# Patient Record
Sex: FEMALE | Race: OTHER | HISPANIC OR LATINO | Employment: UNEMPLOYED | URBAN - METROPOLITAN AREA
[De-identification: names, ages, dates, MRNs, and addresses within clinical notes are randomized per-mention and may not be internally consistent; named-entity substitution may affect disease eponyms.]

---

## 2017-05-05 ENCOUNTER — HOSPITAL ENCOUNTER (EMERGENCY)
Facility: HOSPITAL | Age: 42
Discharge: HOME | End: 2017-05-05
Admitting: EMERGENCY MEDICINE
Payer: COMMERCIAL

## 2017-05-05 VITALS
WEIGHT: 186 LBS | DIASTOLIC BLOOD PRESSURE: 73 MMHG | HEART RATE: 83 BPM | RESPIRATION RATE: 16 BRPM | SYSTOLIC BLOOD PRESSURE: 133 MMHG | OXYGEN SATURATION: 98 % | TEMPERATURE: 97.6 F

## 2017-05-05 DIAGNOSIS — M54.50 ACUTE LOW BACK PAIN: Primary | ICD-10-CM

## 2017-05-05 LAB
BILIRUB UR QL STRIP: NEGATIVE
CLARITY UR: CLEAR
CLARITY, POC: CLEAR
COLOR UR: YELLOW
COLOR, POC: YELLOW
GLUCOSE UR STRIP-MCNC: NEGATIVE MG/DL
HCG UR QL: NEGATIVE
HGB UR QL STRIP.AUTO: NEGATIVE
KETONES UR STRIP-MCNC: NEGATIVE MG/DL
LEUKOCYTE ESTERASE UR QL STRIP: NEGATIVE
NITRITE UR QL STRIP: NEGATIVE
PH UR STRIP.AUTO: 5.5 [PH] (ref 4.5–8)
PROT UR STRIP-MCNC: NEGATIVE MG/DL
SP GR UR STRIP.AUTO: <=1.005 (ref 1–1.03)
UROBILINOGEN UR QL STRIP.AUTO: 0.2 E.U./DL

## 2017-05-05 PROCEDURE — 99283 EMERGENCY DEPT VISIT LOW MDM: CPT

## 2017-05-05 PROCEDURE — 81025 URINE PREGNANCY TEST: CPT | Performed by: PHYSICIAN ASSISTANT

## 2017-05-05 PROCEDURE — 81002 URINALYSIS NONAUTO W/O SCOPE: CPT | Performed by: PHYSICIAN ASSISTANT

## 2017-05-05 PROCEDURE — 81003 URINALYSIS AUTO W/O SCOPE: CPT

## 2017-05-05 PROCEDURE — 96372 THER/PROPH/DIAG INJ SC/IM: CPT

## 2017-05-05 RX ORDER — METHOCARBAMOL 500 MG/1
500 TABLET, FILM COATED ORAL 4 TIMES DAILY
Qty: 40 TABLET | Refills: 0 | Status: SHIPPED | OUTPATIENT
Start: 2017-05-05 | End: 2017-09-20 | Stop reason: ALTCHOICE

## 2017-05-05 RX ORDER — ESTRADIOL 1 MG/1
2 TABLET ORAL DAILY
COMMUNITY
Start: 2015-04-15 | End: 2018-07-05

## 2017-05-05 RX ORDER — IBUPROFEN 600 MG/1
600 TABLET ORAL EVERY 6 HOURS PRN
Qty: 30 TABLET | Refills: 0 | Status: SHIPPED | OUTPATIENT
Start: 2017-05-05 | End: 2017-09-20 | Stop reason: ALTCHOICE

## 2017-05-05 RX ORDER — ALBUTEROL SULFATE 90 UG/1
2 AEROSOL, METERED RESPIRATORY (INHALATION) EVERY 4 HOURS PRN
COMMUNITY
Start: 2017-01-03 | End: 2018-11-22

## 2017-05-05 RX ORDER — DIVALPROEX SODIUM 250 MG/1
250 TABLET, DELAYED RELEASE ORAL
COMMUNITY
Start: 2016-07-12 | End: 2017-08-16

## 2017-05-05 RX ORDER — KETOROLAC TROMETHAMINE 30 MG/ML
15 INJECTION, SOLUTION INTRAMUSCULAR; INTRAVENOUS ONCE
Status: COMPLETED | OUTPATIENT
Start: 2017-05-05 | End: 2017-05-05

## 2017-05-05 RX ADMIN — KETOROLAC TROMETHAMINE 15 MG: 30 INJECTION, SOLUTION INTRAMUSCULAR at 12:27

## 2017-05-23 ENCOUNTER — HOSPITAL ENCOUNTER (EMERGENCY)
Facility: HOSPITAL | Age: 42
Discharge: HOME | End: 2017-05-23
Admitting: EMERGENCY MEDICINE
Payer: COMMERCIAL

## 2017-05-23 VITALS
WEIGHT: 187 LBS | SYSTOLIC BLOOD PRESSURE: 113 MMHG | HEART RATE: 101 BPM | DIASTOLIC BLOOD PRESSURE: 59 MMHG | TEMPERATURE: 97.9 F | OXYGEN SATURATION: 97 % | RESPIRATION RATE: 18 BRPM

## 2017-05-23 DIAGNOSIS — M54.42 ACUTE BILATERAL LOW BACK PAIN WITH BILATERAL SCIATICA: Primary | ICD-10-CM

## 2017-05-23 DIAGNOSIS — M54.41 ACUTE BILATERAL LOW BACK PAIN WITH BILATERAL SCIATICA: Primary | ICD-10-CM

## 2017-05-23 PROCEDURE — 99283 EMERGENCY DEPT VISIT LOW MDM: CPT

## 2017-05-23 PROCEDURE — 96372 THER/PROPH/DIAG INJ SC/IM: CPT

## 2017-05-23 RX ORDER — KETOROLAC TROMETHAMINE 30 MG/ML
15 INJECTION, SOLUTION INTRAMUSCULAR; INTRAVENOUS ONCE
Status: COMPLETED | OUTPATIENT
Start: 2017-05-23 | End: 2017-05-23

## 2017-05-23 RX ADMIN — KETOROLAC TROMETHAMINE 15 MG: 30 INJECTION, SOLUTION INTRAMUSCULAR at 14:13

## 2017-08-16 ENCOUNTER — HOSPITAL ENCOUNTER (EMERGENCY)
Facility: HOSPITAL | Age: 42
Discharge: HOME | End: 2017-08-16
Attending: EMERGENCY MEDICINE | Admitting: EMERGENCY MEDICINE
Payer: COMMERCIAL

## 2017-08-16 VITALS
SYSTOLIC BLOOD PRESSURE: 111 MMHG | RESPIRATION RATE: 16 BRPM | HEART RATE: 89 BPM | WEIGHT: 185 LBS | DIASTOLIC BLOOD PRESSURE: 75 MMHG | OXYGEN SATURATION: 99 % | TEMPERATURE: 98.9 F

## 2017-08-16 DIAGNOSIS — R51.9 HEADACHE: Primary | ICD-10-CM

## 2017-08-16 LAB
ATRIAL RATE: 86 BPM
ATRIAL RATE: 88 BPM
P AXIS: 63 DEGREES
P AXIS: 64 DEGREES
PR INTERVAL: 160 MS
PR INTERVAL: 162 MS
QRS AXIS: 40 DEGREES
QRS AXIS: 43 DEGREES
QRSD INTERVAL: 100 MS
QRSD INTERVAL: 88 MS
QT INTERVAL: 336 MS
QT INTERVAL: 342 MS
QTC INTERVAL: 402 MS
QTC INTERVAL: 413 MS
T WAVE AXIS: 37 DEGREES
T WAVE AXIS: 45 DEGREES
VENTRICULAR RATE: 86 BPM
VENTRICULAR RATE: 88 BPM

## 2017-08-16 PROCEDURE — 93005 ELECTROCARDIOGRAM TRACING: CPT

## 2017-08-16 PROCEDURE — 99283 EMERGENCY DEPT VISIT LOW MDM: CPT

## 2017-08-16 PROCEDURE — 96365 THER/PROPH/DIAG IV INF INIT: CPT

## 2017-08-16 PROCEDURE — 96376 TX/PRO/DX INJ SAME DRUG ADON: CPT

## 2017-08-16 PROCEDURE — 93005 ELECTROCARDIOGRAM TRACING: CPT | Performed by: EMERGENCY MEDICINE

## 2017-08-16 PROCEDURE — 96375 TX/PRO/DX INJ NEW DRUG ADDON: CPT

## 2017-08-16 RX ORDER — KETOROLAC TROMETHAMINE 30 MG/ML
15 INJECTION, SOLUTION INTRAMUSCULAR; INTRAVENOUS ONCE
Status: COMPLETED | OUTPATIENT
Start: 2017-08-16 | End: 2017-08-16

## 2017-08-16 RX ORDER — OLANZAPINE 5 MG/1
10 TABLET, ORALLY DISINTEGRATING ORAL
Status: DISCONTINUED | OUTPATIENT
Start: 2017-08-16 | End: 2017-08-17 | Stop reason: HOSPADM

## 2017-08-16 RX ORDER — DIPHENHYDRAMINE HYDROCHLORIDE 50 MG/ML
25 INJECTION INTRAMUSCULAR; INTRAVENOUS ONCE
Status: COMPLETED | OUTPATIENT
Start: 2017-08-16 | End: 2017-08-16

## 2017-08-16 RX ORDER — METOCLOPRAMIDE HYDROCHLORIDE 5 MG/ML
10 INJECTION INTRAMUSCULAR; INTRAVENOUS ONCE
Status: COMPLETED | OUTPATIENT
Start: 2017-08-16 | End: 2017-08-16

## 2017-08-16 RX ORDER — SUMATRIPTAN 100 MG/1
100 TABLET, FILM COATED ORAL ONCE AS NEEDED
Qty: 20 TABLET | Refills: 0 | Status: SHIPPED | OUTPATIENT
Start: 2017-08-16 | End: 2018-07-05

## 2017-08-16 RX ADMIN — DIPHENHYDRAMINE HYDROCHLORIDE 25 MG: 50 INJECTION, SOLUTION INTRAMUSCULAR; INTRAVENOUS at 22:58

## 2017-08-16 RX ADMIN — DEXAMETHASONE SODIUM PHOSPHATE 10 MG: 10 INJECTION, SOLUTION INTRAMUSCULAR; INTRAVENOUS at 22:35

## 2017-08-16 RX ADMIN — METOCLOPRAMIDE 10 MG: 5 INJECTION, SOLUTION INTRAMUSCULAR; INTRAVENOUS at 21:12

## 2017-08-16 RX ADMIN — VALPROATE SODIUM 500 MG: 100 INJECTION, SOLUTION INTRAVENOUS at 21:37

## 2017-08-16 RX ADMIN — KETOROLAC TROMETHAMINE 15 MG: 30 INJECTION, SOLUTION INTRAMUSCULAR at 21:07

## 2017-08-16 RX ADMIN — OLANZAPINE 10 MG: 5 TABLET, ORALLY DISINTEGRATING ORAL at 21:26

## 2017-08-16 RX ADMIN — DIPHENHYDRAMINE HYDROCHLORIDE 25 MG: 50 INJECTION, SOLUTION INTRAMUSCULAR; INTRAVENOUS at 21:08

## 2017-08-16 RX ADMIN — SODIUM CHLORIDE 1000 ML: 0.9 INJECTION, SOLUTION INTRAVENOUS at 21:08

## 2017-09-20 ENCOUNTER — HOSPITAL ENCOUNTER (EMERGENCY)
Facility: HOSPITAL | Age: 42
Discharge: HOME | End: 2017-09-20
Attending: EMERGENCY MEDICINE | Admitting: EMERGENCY MEDICINE
Payer: COMMERCIAL

## 2017-09-20 VITALS
WEIGHT: 184 LBS | SYSTOLIC BLOOD PRESSURE: 132 MMHG | RESPIRATION RATE: 18 BRPM | DIASTOLIC BLOOD PRESSURE: 80 MMHG | TEMPERATURE: 98.6 F | OXYGEN SATURATION: 98 % | HEART RATE: 76 BPM

## 2017-09-20 DIAGNOSIS — Z76.0 MEDICATION REFILL: ICD-10-CM

## 2017-09-20 DIAGNOSIS — G43.909 MIGRAINE HEADACHE: Primary | ICD-10-CM

## 2017-09-20 PROCEDURE — 96361 HYDRATE IV INFUSION ADD-ON: CPT

## 2017-09-20 PROCEDURE — 96375 TX/PRO/DX INJ NEW DRUG ADDON: CPT

## 2017-09-20 PROCEDURE — 96374 THER/PROPH/DIAG INJ IV PUSH: CPT

## 2017-09-20 PROCEDURE — 99283 EMERGENCY DEPT VISIT LOW MDM: CPT

## 2017-09-20 RX ORDER — IBUPROFEN 400 MG/1
400 TABLET ORAL EVERY 6 HOURS PRN
Qty: 30 TABLET | Refills: 0 | Status: SHIPPED | OUTPATIENT
Start: 2017-09-20 | End: 2018-01-14

## 2017-09-20 RX ORDER — OLANZAPINE 5 MG/1
10 TABLET, ORALLY DISINTEGRATING ORAL ONCE
Status: COMPLETED | OUTPATIENT
Start: 2017-09-20 | End: 2017-09-20

## 2017-09-20 RX ORDER — DIPHENHYDRAMINE HYDROCHLORIDE 50 MG/ML
50 INJECTION INTRAMUSCULAR; INTRAVENOUS ONCE
Status: COMPLETED | OUTPATIENT
Start: 2017-09-20 | End: 2017-09-20

## 2017-09-20 RX ORDER — ACETAMINOPHEN 325 MG/1
650 TABLET ORAL EVERY 6 HOURS PRN
Qty: 30 TABLET | Refills: 0 | Status: SHIPPED | OUTPATIENT
Start: 2017-09-20 | End: 2018-07-05

## 2017-09-20 RX ORDER — KETOROLAC TROMETHAMINE 30 MG/ML
15 INJECTION, SOLUTION INTRAMUSCULAR; INTRAVENOUS ONCE
Status: COMPLETED | OUTPATIENT
Start: 2017-09-20 | End: 2017-09-20

## 2017-09-20 RX ORDER — METOCLOPRAMIDE HYDROCHLORIDE 5 MG/ML
10 INJECTION INTRAMUSCULAR; INTRAVENOUS ONCE
Status: COMPLETED | OUTPATIENT
Start: 2017-09-20 | End: 2017-09-20

## 2017-09-20 RX ADMIN — SODIUM CHLORIDE 1000 ML: 0.9 INJECTION, SOLUTION INTRAVENOUS at 21:18

## 2017-09-20 RX ADMIN — METOCLOPRAMIDE 10 MG: 5 INJECTION, SOLUTION INTRAMUSCULAR; INTRAVENOUS at 21:25

## 2017-09-20 RX ADMIN — DIPHENHYDRAMINE HYDROCHLORIDE 50 MG: 50 INJECTION, SOLUTION INTRAMUSCULAR; INTRAVENOUS at 21:19

## 2017-09-20 RX ADMIN — DEXAMETHASONE SODIUM PHOSPHATE 10 MG: 10 INJECTION, SOLUTION INTRAMUSCULAR; INTRAVENOUS at 21:23

## 2017-09-20 RX ADMIN — OLANZAPINE 10 MG: 5 TABLET, ORALLY DISINTEGRATING ORAL at 21:12

## 2017-09-20 RX ADMIN — KETOROLAC TROMETHAMINE 15 MG: 30 INJECTION, SOLUTION INTRAMUSCULAR at 21:22

## 2017-11-07 ENCOUNTER — HOSPITAL ENCOUNTER (EMERGENCY)
Facility: HOSPITAL | Age: 42
Discharge: HOME | End: 2017-11-07
Attending: EMERGENCY MEDICINE | Admitting: EMERGENCY MEDICINE
Payer: COMMERCIAL

## 2017-11-07 VITALS
TEMPERATURE: 97.5 F | WEIGHT: 190 LBS | OXYGEN SATURATION: 99 % | DIASTOLIC BLOOD PRESSURE: 77 MMHG | RESPIRATION RATE: 16 BRPM | SYSTOLIC BLOOD PRESSURE: 123 MMHG | HEART RATE: 88 BPM

## 2017-11-07 DIAGNOSIS — G43.909 MIGRAINE: Primary | ICD-10-CM

## 2017-11-07 PROCEDURE — 96361 HYDRATE IV INFUSION ADD-ON: CPT

## 2017-11-07 PROCEDURE — 96374 THER/PROPH/DIAG INJ IV PUSH: CPT

## 2017-11-07 PROCEDURE — 99283 EMERGENCY DEPT VISIT LOW MDM: CPT

## 2017-11-07 PROCEDURE — 96375 TX/PRO/DX INJ NEW DRUG ADDON: CPT

## 2017-11-07 PROCEDURE — 96376 TX/PRO/DX INJ SAME DRUG ADON: CPT

## 2017-11-07 RX ORDER — DIPHENHYDRAMINE HYDROCHLORIDE 50 MG/ML
25 INJECTION INTRAMUSCULAR; INTRAVENOUS EVERY 6 HOURS PRN
Status: DISCONTINUED | OUTPATIENT
Start: 2017-11-07 | End: 2017-11-08 | Stop reason: HOSPADM

## 2017-11-07 RX ORDER — METOCLOPRAMIDE HYDROCHLORIDE 5 MG/ML
10 INJECTION INTRAMUSCULAR; INTRAVENOUS ONCE
Status: COMPLETED | OUTPATIENT
Start: 2017-11-07 | End: 2017-11-07

## 2017-11-07 RX ORDER — KETOROLAC TROMETHAMINE 30 MG/ML
15 INJECTION, SOLUTION INTRAMUSCULAR; INTRAVENOUS ONCE
Status: COMPLETED | OUTPATIENT
Start: 2017-11-07 | End: 2017-11-07

## 2017-11-07 RX ORDER — DIPHENHYDRAMINE HYDROCHLORIDE 50 MG/ML
25 INJECTION INTRAMUSCULAR; INTRAVENOUS ONCE
Status: COMPLETED | OUTPATIENT
Start: 2017-11-07 | End: 2017-11-07

## 2017-11-07 RX ADMIN — KETOROLAC TROMETHAMINE 15 MG: 30 INJECTION, SOLUTION INTRAMUSCULAR at 20:39

## 2017-11-07 RX ADMIN — DIPHENHYDRAMINE HYDROCHLORIDE 25 MG: 50 INJECTION, SOLUTION INTRAMUSCULAR; INTRAVENOUS at 20:40

## 2017-11-07 RX ADMIN — SODIUM CHLORIDE 1000 ML: 0.9 INJECTION, SOLUTION INTRAVENOUS at 20:38

## 2017-11-07 RX ADMIN — METOCLOPRAMIDE 10 MG: 5 INJECTION, SOLUTION INTRAMUSCULAR; INTRAVENOUS at 21:35

## 2017-11-07 RX ADMIN — KETOROLAC TROMETHAMINE 15 MG: 30 INJECTION, SOLUTION INTRAMUSCULAR at 21:33

## 2017-11-07 RX ADMIN — DIPHENHYDRAMINE HYDROCHLORIDE 25 MG: 50 INJECTION, SOLUTION INTRAMUSCULAR; INTRAVENOUS at 21:32

## 2017-11-07 RX ADMIN — METOCLOPRAMIDE 10 MG: 5 INJECTION, SOLUTION INTRAMUSCULAR; INTRAVENOUS at 20:42

## 2017-11-08 NOTE — ED PROVIDER NOTES
History  Chief Complaint   Patient presents with    Headache     migraine headache, hx of same  reports nausea and vomting  reports sensitivity to light and sound  started today  History provided by:  Patient   used: No    Migraine   Location:  Diffuse head  Timing:  Constant  Progression:  Unchanged  Chronicity:  Recurrent  Ineffective treatments:  Naproxen  Associated symptoms: headaches, nausea and vomiting    Associated symptoms: no congestion, no cough, no fever, no myalgias, no rash, no rhinorrhea and no sore throat        Prior to Admission Medications   Prescriptions Last Dose Informant Patient Reported? Taking? DULoxetine (CYMBALTA) 60 mg delayed release capsule   Yes No   Sig: Take 60 mg by mouth daily  QUEtiapine Fumarate (SEROQUEL PO)   Yes No   Sig: Take 800 mg by mouth daily at bedtime  Riboflavin 400 MG CAPS   No No   Sig: Take 1 capsule by mouth daily   SUMAtriptan (IMITREX) 100 mg tablet   No No   Sig: Take 1 tablet by mouth once as needed for migraine for up to 1 dose   acetaminophen (TYLENOL) 325 mg tablet   No No   Sig: Take 2 tablets by mouth every 6 (six) hours as needed for mild pain or fever   albuterol (PROVENTIL HFA,VENTOLIN HFA) 90 mcg/act inhaler   Yes No   Sig: Inhale 2 puffs   clonazePAM (KlonoPIN) 2 mg tablet   Yes No   Sig: Take 2 mg by mouth 2 (two) times a day as needed for seizures  estradiol (ESTRACE) 1 mg tablet   Yes No   Sig: Take 2 mg by mouth   ibuprofen (MOTRIN) 400 mg tablet   No No   Sig: Take 1 tablet by mouth every 6 (six) hours as needed for mild pain for up to 7 days   magnesium oxide (MAG-OX) 400 mg   No No   Sig: Take 1 tablet by mouth daily      Facility-Administered Medications: None       Past Medical History:   Diagnosis Date    Asthma     Migraine     Pituitary tumor     Psychiatric disorder     Depression       Past Surgical History:   Procedure Laterality Date    HYSTERECTOMY         History reviewed   No pertinent family history  I have reviewed and agree with the history as documented  Social History   Substance Use Topics    Smoking status: Never Smoker    Smokeless tobacco: Never Used    Alcohol use No        Review of Systems   Constitutional: Negative for chills and fever  HENT: Negative for congestion, rhinorrhea, sinus pressure and sore throat  Eyes: Negative for photophobia, pain, redness and visual disturbance  Respiratory: Negative for cough  Gastrointestinal: Positive for nausea and vomiting  Musculoskeletal: Negative for myalgias, neck pain and neck stiffness  Skin: Negative for rash  Neurological: Positive for headaches  Negative for dizziness, facial asymmetry, speech difficulty, weakness, light-headedness and numbness  Psychiatric/Behavioral: Negative for confusion  All other systems reviewed and are negative  Physical Exam  ED Triage Vitals   Temperature Pulse Respirations Blood Pressure SpO2   11/07/17 2023 11/07/17 2023 11/07/17 2023 11/07/17 2023 11/07/17 2023   97 5 °F (36 4 °C) 91 16 132/78 98 %      Temp Source Heart Rate Source Patient Position - Orthostatic VS BP Location FiO2 (%)   11/07/17 2023 11/07/17 2127 11/07/17 2023 11/07/17 2023 --   Oral Monitor Sitting Right arm       Pain Score       11/07/17 2023       Worst Possible Pain           Orthostatic Vital Signs  Vitals:    11/07/17 2023 11/07/17 2127   BP: 132/78 123/77   Pulse: 91 88   Patient Position - Orthostatic VS: Sitting Lying       Physical Exam   Constitutional: She is oriented to person, place, and time  Vital signs are normal  She appears well-developed and well-nourished  Non-toxic appearance  She does not have a sickly appearance  She does not appear ill  No distress  HENT:   Head: Normocephalic and atraumatic     Right Ear: Tympanic membrane normal    Left Ear: Tympanic membrane normal    Nose: Nose normal    Mouth/Throat: Oropharynx is clear and moist and mucous membranes are normal  No oropharyngeal exudate  Eyes: Conjunctivae and EOM are normal  Pupils are equal, round, and reactive to light  Neck: Normal range of motion and full passive range of motion without pain  Neck supple  No neck rigidity  No Brudzinski's sign and no Kernig's sign noted  Cardiovascular: Normal rate, regular rhythm, normal heart sounds and intact distal pulses  Exam reveals no gallop and no friction rub  No murmur heard  Pulmonary/Chest: Effort normal and breath sounds normal  No respiratory distress  She has no wheezes  She has no rales  Abdominal: Soft  Bowel sounds are normal  She exhibits no distension  There is no tenderness  There is no rigidity, no rebound and no guarding  Lymphadenopathy:     She has no cervical adenopathy  Neurological: She is alert and oriented to person, place, and time  She has normal strength  No cranial nerve deficit or sensory deficit  She exhibits normal muscle tone  Coordination and gait normal    Skin: Skin is warm and dry  No ecchymosis, no petechiae and no rash noted  She is not diaphoretic  No pallor  Nursing note and vitals reviewed        ED Medications  Medications   diphenhydrAMINE (BENADRYL) injection 25 mg (25 mg Intravenous Given 11/7/17 2040)   sodium chloride 0 9 % bolus 1,000 mL (0 mL Intravenous Stopped 11/7/17 2212)   ketorolac (TORADOL) 30 mg/mL injection 15 mg (15 mg Intravenous Given 11/7/17 2039)   metoclopramide (REGLAN) injection 10 mg (10 mg Intravenous Given 11/7/17 2042)   metoclopramide (REGLAN) injection 10 mg (10 mg Intravenous Given 11/7/17 2135)   diphenhydrAMINE (BENADRYL) injection 25 mg (25 mg Intravenous Given 11/7/17 2132)   ketorolac (TORADOL) 30 mg/mL injection 15 mg (15 mg Intravenous Given 11/7/17 2133)       Diagnostic Studies  Results Reviewed     None                 No orders to display              Procedures  Procedures       Phone Contacts  ED Phone Contact    ED Course  ED Course as of Nov 07 2312 Tue Nov 07, 2017   12 Pt reports relief of pain  [facility]  Number of Diagnoses or Management Options  Diagnosis management comments: Migraine - Will give symptomatic tx  CritCare Time    Disposition  Final diagnoses:   Migraine     Time reflects when diagnosis was documented in both MDM as applicable and the Disposition within this note     Time User Action Codes Description Comment    11/7/2017 10:22 PM Kishan Romy 48 [G25 450] Migraine       ED Disposition     ED Disposition Condition Comment    Discharge  Aditi Kilpatrick discharge to home/self care  Condition at discharge: Good        Follow-up Information     Follow up With Specialties Details Why [facility] Schedule an appointment as soon as possible for a visit For follow up [phone]  [phone]          Discharge Medication List as of 11/7/2017 10:22 PM      CONTINUE these medications which have NOT CHANGED    Details   acetaminophen (TYLENOL) 325 mg tablet Take 2 tablets by mouth every 6 (six) hours as needed for mild pain or fever, Starting Wed 9/20/2017, Print      albuterol (PROVENTIL HFA,VENTOLIN HFA) 90 mcg/act inhaler Inhale 2 puffs, Starting 1/3/2017, Until Discontinued, Historical Med      clonazePAM (KlonoPIN) 2 mg tablet Take 2 mg by mouth 2 (two) times a day as needed for seizures  , Until Discontinued, Historical Med      DULoxetine (CYMBALTA) 60 mg delayed release capsule Take 60 mg by mouth daily  , Until Discontinued, Historical Med      estradiol (ESTRACE) 1 mg tablet Take 2 mg by mouth, Starting 4/15/2015, Until Discontinued, Historical Med      ibuprofen (MOTRIN) 400 mg tablet Take 1 tablet by mouth every 6 (six) hours as needed for mild pain for up to 7 days, Starting Wed 9/20/2017, Until Wed 9/27/2017, Print      magnesium oxide (MAG-OX) 400 mg Take 1 tablet by mouth daily, Starting Wed 9/20/2017, Print      QUEtiapine Fumarate (SEROQUEL PO) Take 800 mg by mouth daily at bedtime  , Until Discontinued, Historical Med      Riboflavin 400 MG CAPS Take 1 capsule by mouth daily, Starting Wed 9/20/2017, Print      SUMAtriptan (IMITREX) 100 mg tablet Take 1 tablet by mouth once as needed for migraine for up to 1 dose, Starting Wed 8/16/2017, Print           No discharge procedures on file      ED Provider  Electronically Signed by           Scotty Bucio 24, DO  11/07/17 5296

## 2017-11-15 ENCOUNTER — HOSPITAL ENCOUNTER (EMERGENCY)
Facility: HOSPITAL | Age: 42
Discharge: HOME | End: 2017-11-15
Admitting: EMERGENCY MEDICINE
Payer: COMMERCIAL

## 2017-11-15 VITALS
TEMPERATURE: 98.1 F | OXYGEN SATURATION: 99 % | SYSTOLIC BLOOD PRESSURE: 126 MMHG | RESPIRATION RATE: 18 BRPM | WEIGHT: 200 LBS | HEART RATE: 78 BPM | DIASTOLIC BLOOD PRESSURE: 80 MMHG

## 2017-11-15 DIAGNOSIS — R30.0 DYSURIA: Primary | ICD-10-CM

## 2017-11-15 DIAGNOSIS — G89.29 CHRONIC BILATERAL LOW BACK PAIN WITHOUT SCIATICA: ICD-10-CM

## 2017-11-15 DIAGNOSIS — M54.50 CHRONIC BILATERAL LOW BACK PAIN WITHOUT SCIATICA: ICD-10-CM

## 2017-11-15 LAB
ALBUMIN SERPL BCP-MCNC: 3.6 G/DL (ref 3.5–5)
ALP SERPL-CCNC: 97 U/L (ref 46–116)
ALT SERPL W P-5'-P-CCNC: 39 U/L (ref 12–78)
ANION GAP SERPL CALCULATED.3IONS-SCNC: 8 MMOL/L (ref 4–13)
AST SERPL W P-5'-P-CCNC: 35 U/L (ref 5–45)
BACTERIA UR QL AUTO: ABNORMAL /HPF
BASOPHILS # BLD AUTO: 0.01 THOUSANDS/ΜL (ref 0–0.1)
BASOPHILS NFR BLD AUTO: 0 % (ref 0–1)
BILIRUB SERPL-MCNC: 0.19 MG/DL (ref 0.2–1)
BILIRUB UR QL STRIP: NEGATIVE
BUN SERPL-MCNC: 11 MG/DL (ref 5–25)
CALCIUM SERPL-MCNC: 8.4 MG/DL (ref 8.3–10.1)
CHLORIDE SERPL-SCNC: 104 MMOL/L (ref 100–108)
CLARITY UR: CLEAR
CO2 SERPL-SCNC: 29 MMOL/L (ref 21–32)
COLOR UR: YELLOW
CREAT SERPL-MCNC: 0.93 MG/DL (ref 0.6–1.3)
EOSINOPHIL # BLD AUTO: 0.15 THOUSAND/ΜL (ref 0–0.61)
EOSINOPHIL NFR BLD AUTO: 2 % (ref 0–6)
ERYTHROCYTE [DISTWIDTH] IN BLOOD BY AUTOMATED COUNT: 13 % (ref 11.6–15.1)
GFR SERPL CREATININE-BSD FRML MDRD: 76 ML/MIN/1.73SQ M
GLUCOSE SERPL-MCNC: 105 MG/DL (ref 65–140)
GLUCOSE UR STRIP-MCNC: NEGATIVE MG/DL
HCT VFR BLD AUTO: 35 % (ref 34.8–46.1)
HGB BLD-MCNC: 11.7 G/DL (ref 11.5–15.4)
HGB UR QL STRIP.AUTO: ABNORMAL
KETONES UR STRIP-MCNC: NEGATIVE MG/DL
LEUKOCYTE ESTERASE UR QL STRIP: NEGATIVE
LIPASE SERPL-CCNC: 127 U/L (ref 73–393)
LYMPHOCYTES # BLD AUTO: 2.08 THOUSANDS/ΜL (ref 0.6–4.47)
LYMPHOCYTES NFR BLD AUTO: 31 % (ref 14–44)
MCH RBC QN AUTO: 31 PG (ref 26.8–34.3)
MCHC RBC AUTO-ENTMCNC: 33.4 G/DL (ref 31.4–37.4)
MCV RBC AUTO: 93 FL (ref 82–98)
MONOCYTES # BLD AUTO: 0.56 THOUSAND/ΜL (ref 0.17–1.22)
MONOCYTES NFR BLD AUTO: 8 % (ref 4–12)
NEUTROPHILS # BLD AUTO: 4.03 THOUSANDS/ΜL (ref 1.85–7.62)
NEUTS SEG NFR BLD AUTO: 59 % (ref 43–75)
NITRITE UR QL STRIP: NEGATIVE
NON-SQ EPI CELLS URNS QL MICRO: ABNORMAL /HPF
NRBC BLD AUTO-RTO: 0 /100 WBCS
PH UR STRIP.AUTO: 7 [PH] (ref 4.5–8)
PLATELET # BLD AUTO: 247 THOUSANDS/UL (ref 149–390)
PMV BLD AUTO: 10.2 FL (ref 8.9–12.7)
POTASSIUM SERPL-SCNC: 3.5 MMOL/L (ref 3.5–5.3)
PROT SERPL-MCNC: 7.2 G/DL (ref 6.4–8.2)
PROT UR STRIP-MCNC: NEGATIVE MG/DL
RBC # BLD AUTO: 3.78 MILLION/UL (ref 3.81–5.12)
RBC #/AREA URNS AUTO: ABNORMAL /HPF
SODIUM SERPL-SCNC: 141 MMOL/L (ref 136–145)
SP GR UR STRIP.AUTO: 1.02 (ref 1–1.03)
UROBILINOGEN UR QL STRIP.AUTO: 0.2 E.U./DL
WBC # BLD AUTO: 6.83 THOUSAND/UL (ref 4.31–10.16)
WBC #/AREA URNS AUTO: ABNORMAL /HPF

## 2017-11-15 PROCEDURE — 96374 THER/PROPH/DIAG INJ IV PUSH: CPT

## 2017-11-15 PROCEDURE — 99284 EMERGENCY DEPT VISIT MOD MDM: CPT

## 2017-11-15 PROCEDURE — 85025 COMPLETE CBC W/AUTO DIFF WBC: CPT | Performed by: NURSE PRACTITIONER

## 2017-11-15 PROCEDURE — 80053 COMPREHEN METABOLIC PANEL: CPT | Performed by: NURSE PRACTITIONER

## 2017-11-15 PROCEDURE — 83690 ASSAY OF LIPASE: CPT | Performed by: NURSE PRACTITIONER

## 2017-11-15 PROCEDURE — 81001 URINALYSIS AUTO W/SCOPE: CPT

## 2017-11-15 PROCEDURE — 36415 COLL VENOUS BLD VENIPUNCTURE: CPT | Performed by: NURSE PRACTITIONER

## 2017-11-15 PROCEDURE — 96361 HYDRATE IV INFUSION ADD-ON: CPT

## 2017-11-15 PROCEDURE — 81002 URINALYSIS NONAUTO W/O SCOPE: CPT | Performed by: NURSE PRACTITIONER

## 2017-11-15 RX ORDER — SULFAMETHOXAZOLE AND TRIMETHOPRIM 800; 160 MG/1; MG/1
1 TABLET ORAL 2 TIMES DAILY
Qty: 6 TABLET | Refills: 0 | Status: SHIPPED | OUTPATIENT
Start: 2017-11-15 | End: 2017-11-18

## 2017-11-15 RX ORDER — SULFAMETHOXAZOLE AND TRIMETHOPRIM 800; 160 MG/1; MG/1
1 TABLET ORAL ONCE
Status: COMPLETED | OUTPATIENT
Start: 2017-11-15 | End: 2017-11-15

## 2017-11-15 RX ORDER — ONDANSETRON 2 MG/ML
4 INJECTION INTRAMUSCULAR; INTRAVENOUS ONCE
Status: COMPLETED | OUTPATIENT
Start: 2017-11-15 | End: 2017-11-15

## 2017-11-15 RX ADMIN — SULFAMETHOXAZOLE AND TRIMETHOPRIM 1 TABLET: 800; 160 TABLET ORAL at 21:05

## 2017-11-15 RX ADMIN — SODIUM CHLORIDE 1000 ML: 0.9 INJECTION, SOLUTION INTRAVENOUS at 20:15

## 2017-11-15 RX ADMIN — ONDANSETRON 4 MG: 2 INJECTION INTRAMUSCULAR; INTRAVENOUS at 20:16

## 2017-11-16 NOTE — DISCHARGE INSTRUCTIONS
Your lab testing and urine testing today was unremarkable for an acute process  You are being treated for the dysuria (painful urination) that you have  You are to take the bactrim DS as prescribed  Increase water intake  Follow up with your PCP    Chronic Back Pain   WHAT YOU NEED TO KNOW:   Chronic back pain is back pain that lasts 3 months or longer  This may include pain that has not been controlled or does not improve with treatment  Your back pain may cause weakness or pain that spreads to your arms or legs  DISCHARGE INSTRUCTIONS:   Return to the emergency department if:   · You have severe pain  · You have new signs of numbness or weakness, especially in your lower back, legs, arms, or genital area  · You lose control of your bladder or bowel movements  · You have a fever or sudden weight loss  Contact your healthcare provider if:   · You have new or worsened pain  · You have questions or concerns about your condition or care  Medicines:   · NSAIDs  help decrease swelling and pain  This medicine can be bought with or without a doctor's order  This medicine can cause stomach bleeding or kidney problems in certain people  If you take blood thinner medicine, always ask your healthcare provider if NSAIDs are safe for you  Always read the medicine label and follow the directions on it before using this medicine  · Acetaminophen  decreases pain  It is available without a doctor's order  Ask how much to take and how often to take it  Follow directions  Acetaminophen can cause liver damage if not taken correctly  · Prescription pain medicine  may also be given to decrease pain  Do not wait until the pain is severe before you take this medicine  · Muscle relaxers  help decrease muscle spasms and back pain  · Take your medicine as directed  Contact your healthcare provider if you think your medicine is not helping or if you have side effects  Tell him if you are allergic to any medicine  Keep a list of the medicines, vitamins, and herbs you take  Include the amounts, and when and why you take them  Bring the list or the pill bottles to follow-up visits  Carry your medicine list with you in case of an emergency  Follow up with your healthcare provider as directed: You may be referred to a sports medicine or spine specialist  Write down your questions so you remember to ask them during your visits  Manage your chronic back pain:   · Heat  helps decrease pain and muscle spasms  Apply heat on your back for 15 to 20 minutes every 2 hours or as often as directed  · Stay active  as much as you can without causing more pain  Ask your healthcare provider what exercises are right for you  Do not sit or lie down for long periods  This could make your back pain worse  Avoid heavy lifting until your pain is gone  · A physical therapist  can teach you exercises to help improve movement and strength, and to decrease pain  © 2017 [address] Information is for End User's use only and may not be sold, redistributed or otherwise used for commercial purposes  All illustrations and images included in CareNotes® are the copyrighted property of [employer] D [employer] M [employer]  or [address]  The above information is an  only  It is not intended as medical advice for individual conditions or treatments  Talk to your doctor, nurse or pharmacist before following any medical regimen to see if it is safe and effective for you  Dysuria   WHAT YOU NEED TO KNOW:   Dysuria is difficulty urinating, or pain, burning, or discomfort with urination  Dysuria is usually a symptom of another problem  DISCHARGE INSTRUCTIONS:   Return to the emergency department if:   · You have severe back, side, or abdominal pain  · You have fever and shaking chills  · You vomit several times in a row  Contact your healthcare provider if:   · Your symptoms do not go away, even after treatment       · You have questions or concerns about your condition or care  Medicines:   · Medicines  may be given to help treat a bacterial infection or help decrease bladder spasms  · Take your medicine as directed  Contact your healthcare provider if you think your medicine is not helping or if you have side effects  Tell him of her if you are allergic to any medicine  Keep a list of the medicines, vitamins, and herbs you take  Include the amounts, and when and why you take them  Bring the list or the pill bottles to follow-up visits  Carry your medicine list with you in case of an emergency  Follow up with your healthcare provider as directed: Your healthcare provider may also refer you to a urologist or nephrologist to have additional testing  Write down your questions so you remember to ask them during your visits  Manage your dysuria:   · Drink more liquids  Liquids help flush out bacteria that may be causing an infection  Ask your healthcare provider how much liquid to drink each day and which liquids are best for you  · Take sitz baths as directed  Fill a bathtub with 4 to 6 inches of warm water  You may also use a sitz bath pan that fits over a toilet  Sit in the sitz bath for 20 minutes  Do this 2 to 3 times a day, or as directed  The warm water can help decrease pain and swelling  © 2017 [facility] Information is for End User's use only and may not be sold, redistributed or otherwise used for commercial purposes  All illustrations and images included in CareNotes® are the copyrighted property of [employer]  or Dmitriy Swenson  The above information is an  only  It is not intended as medical advice for individual conditions or treatments  Talk to your doctor, nurse or pharmacist before following any medical regimen to see if it is safe and effective for you

## 2017-11-16 NOTE — ED PROVIDER NOTES
History  Chief Complaint   Patient presents with    Flank Pain     "blood work for [address] for my kidneys because i have been peeing alot " right sided back pains  called PCP and told to come to ED  This is a 43year old female who states has lower back pain and is seeing pain management and has received 1 steroid injection  She states that she is now experiencing dysuria, relief with voiding, more back pain and vomiting  She denies hematuria or hx of kidney stones  Denies fevers, chills, nausea  Pt states her PCP had given her lab slip for blood and urine but called her PCP with c/o and was told to come to ED   LMP  hysteretomy          History provided by:  Patient and medical records   used: No    Flank Pain   Pain location: bilateral lower back   Pain quality: stabbing    Pain radiates to:  Does not radiate  Pain severity:  Mild  Onset quality:  Gradual  Progression:  Waxing and waning  Chronicity:  New  Relieved by:  Nothing  Worsened by:  Urination  Ineffective treatments:  None tried  Associated symptoms: dysuria and vomiting    Associated symptoms: no hematuria        Prior to Admission Medications   Prescriptions Last Dose Informant Patient Reported? Taking? DULoxetine (CYMBALTA) 60 mg delayed release capsule   Yes No   Sig: Take 60 mg by mouth daily  QUEtiapine Fumarate (SEROQUEL PO)   Yes No   Sig: Take 800 mg by mouth daily at bedtime  Riboflavin 400 MG CAPS   No No   Sig: Take 1 capsule by mouth daily   SUMAtriptan (IMITREX) 100 mg tablet   No No   Sig: Take 1 tablet by mouth once as needed for migraine for up to 1 dose   acetaminophen (TYLENOL) 325 mg tablet   No No   Sig: Take 2 tablets by mouth every 6 (six) hours as needed for mild pain or fever   albuterol (PROVENTIL HFA,VENTOLIN HFA) 90 mcg/act inhaler   Yes No   Sig: Inhale 2 puffs   clonazePAM (KlonoPIN) 2 mg tablet   Yes No   Sig: Take 2 mg by mouth 2 (two) times a day as needed for seizures     estradiol (ESTRACE) 1 mg tablet   Yes No   Sig: Take 2 mg by mouth   ibuprofen (MOTRIN) 400 mg tablet   No No   Sig: Take 1 tablet by mouth every 6 (six) hours as needed for mild pain for up to 7 days   magnesium oxide (MAG-OX) 400 mg   No No   Sig: Take 1 tablet by mouth daily      Facility-Administered Medications: None       Past Medical History:   Diagnosis Date    Asthma     Migraine     Pituitary tumor     Psychiatric disorder     Depression       Past Surgical History:   Procedure Laterality Date    HYSTERECTOMY         History reviewed  No pertinent family history  I have reviewed and agree with the history as documented  Social History   Substance Use Topics    Smoking status: Never Smoker    Smokeless tobacco: Never Used    Alcohol use No        Review of Systems   Constitutional: Negative  HENT: Negative  Eyes: Negative  Respiratory: Negative  Cardiovascular: Negative  Gastrointestinal: Positive for vomiting  Endocrine: Negative  Genitourinary: Positive for dysuria, flank pain and urgency  Negative for hematuria  Musculoskeletal: Positive for back pain  Skin: Negative  Allergic/Immunologic: Negative  Neurological: Negative  Hematological: Negative  Psychiatric/Behavioral: Negative  Physical Exam  ED Triage Vitals [11/15/17 1951]   Temperature Pulse Respirations Blood Pressure SpO2   98 1 °F (36 7 °C) 78 18 126/80 99 %      Temp src Heart Rate Source Patient Position - Orthostatic VS BP Location FiO2 (%)   -- Monitor -- Right arm --      Pain Score       7           Orthostatic Vital Signs  Vitals:    11/15/17 1951   BP: 126/80   Pulse: 78       Physical Exam   Constitutional: She is oriented to person, place, and time  She appears well-developed and well-nourished  No distress  HENT:   Head: Normocephalic and atraumatic  Eyes: EOM are normal  Pupils are equal, round, and reactive to light  Neck: Normal range of motion  Neck supple     Cardiovascular: Normal rate, regular rhythm and normal heart sounds  Pulmonary/Chest: Effort normal and breath sounds normal    Abdominal: Soft  Bowel sounds are normal  She exhibits no distension  There is no tenderness  No flank pain     Musculoskeletal: Normal range of motion  She exhibits tenderness  She exhibits no edema or deformity  Neurological: She is alert and oriented to person, place, and time  Skin: Skin is warm and dry  Capillary refill takes less than 2 seconds  She is not diaphoretic  Psychiatric: She has a normal mood and affect  Her behavior is normal  Judgment and thought content normal    Nursing note and vitals reviewed        ED Medications  Medications   sodium chloride 0 9 % bolus 1,000 mL (1,000 mL Intravenous New Bag 11/15/17 2015)   sulfamethoxazole-trimethoprim (BACTRIM DS) 800-160 mg per tablet 1 tablet (not administered)   ondansetron (ZOFRAN) injection 4 mg (4 mg Intravenous Given 11/15/17 2016)       Diagnostic Studies  Results Reviewed     Procedure Component Value Units Date/Time    Urine Microscopic [[id number]]  (Abnormal) Collected:  11/15/17 2119    Lab Status:  Final result Specimen:  Urine from Urine, Clean Catch Updated:  11/15/17 2044     RBC, UA 1-2 (A) /hpf      WBC, UA None Seen /hpf      Epithelial Cells Occasional /hpf      Bacteria, UA Occasional /hpf     Comprehensive metabolic panel [[id number]]  (Abnormal) Collected:  11/15/17 2017    Lab Status:  Final result Specimen:  Blood from Arm, Right Updated:  11/15/17 2043     Sodium 141 mmol/L      Potassium 3 5 mmol/L      Chloride 104 mmol/L      CO2 29 mmol/L      Anion Gap 8 mmol/L      BUN 11 mg/dL      Creatinine 0 93 mg/dL      Glucose 105 mg/dL      Calcium 8 4 mg/dL      AST 35 U/L      ALT 39 U/L      Alkaline Phosphatase 97 U/L      Total Protein 7 2 g/dL      Albumin 3 6 g/dL      Total Bilirubin 0 19 (L) mg/dL      eGFR 76 ml/min/1 73sq m     Narrative:         National Kidney Disease Education Program recommendations are as follows:  GFR calculation is accurate only with a steady state creatinine  Chronic Kidney disease less than 60 ml/min/1 73 sq  meters  Kidney failure less than 15 ml/min/1 73 sq  meters      Lipase [[id number]]  (Normal) Collected:  11/15/17 2017    Lab Status:  Final result Specimen:  Blood from Arm, Right Updated:  11/15/17 2043     Lipase 127 u/L     CBC and differential [[id number]]  (Abnormal) Collected:  11/15/17 2017    Lab Status:  Final result Specimen:  Blood from Arm, Right Updated:  11/15/17 2032     WBC 6 83 Thousand/uL      RBC 3 78 (L) Million/uL      Hemoglobin 11 7 g/dL      Hematocrit 35 0 %      MCV 93 fL      MCH 31 0 pg      MCHC 33 4 g/dL      RDW 13 0 %      MPV 10 2 fL      Platelets 827 Thousands/uL      nRBC 0 /100 WBCs      Neutrophils Relative 59 %      Lymphocytes Relative 31 %      Monocytes Relative 8 %      Eosinophils Relative 2 %      Basophils Relative 0 %      Neutrophils Absolute 4 03 Thousands/µL      Lymphocytes Absolute 2 08 Thousands/µL      Monocytes Absolute 0 56 Thousand/µL      Eosinophils Absolute 0 15 Thousand/µL      Basophils Absolute 0 01 Thousands/µL     POCT urinalysis dipstick [[id number]]  (Abnormal) Resulted:  11/15/17 2005    Lab Status:  Final result Specimen:  Urine Updated:  11/15/17 2005    ED Urine Macroscopic [[id number]]  (Abnormal) Collected:  11/15/17 2119    Lab Status:  Final result Specimen:  Urine Updated:  11/15/17 2004     Color, UA Yellow     Clarity, UA Clear     pH, UA 7 0     Leukocytes, UA Negative     Nitrite, UA Negative     Protein, UA Negative mg/dl      Glucose, UA Negative mg/dl      Ketones, UA Negative mg/dl      Urobilinogen, UA 0 2 E U /dl      Bilirubin, UA Negative     Blood, UA Trace (A)     Specific [place], UA 1 025    Narrative:       CLINITEK RESULT                 No orders to display              Procedures  Procedures       Phone Contacts  ED Phone Contact    ED Course  ED Course as of Nov 15 2053   Wed Nov 15, 2017   2047 Labs reviewed and are unremarkable  Urine does not show infection  Will treat symptoms x 3 days and pt is to follow up  Pt verbalizes understanding of d/c instructions  MDM  Number of Diagnoses or Management Options  Diagnosis management comments: Differential diagnosis:  Kidney stones, pyelonephritis, UTI, lumbar pain     Labs  Urine  IVF  zofran         Amount and/or Complexity of Data Reviewed  Clinical lab tests: ordered and reviewed  Tests in the radiology section of CPT®: ordered and reviewed  Review and summarize past medical records: yes      CritCare Time    Disposition  Final diagnoses:   Dysuria   Chronic bilateral low back pain without sciatica     Time reflects when diagnosis was documented in both MDM as applicable and the Disposition within this note     Time User Action Codes Description Comment    11/15/2017  8:50 PM Che Earline Add [R30 0] Dysuria     11/15/2017  8:50 PM Che Earline Add [M54 5,  G89 29] Chronic bilateral low back pain without sciatica       ED Disposition     ED Disposition Condition Comment    Discharge  Elijah Frame discharge to home/self care      Condition at discharge: Good        Follow-up Information     Follow up With Specialties Details Why Contact Info Additional [phone] [facility] Internal Medicine Schedule an appointment as soon as possible for a visit in 2 days  [place]  [facility] [address] Emergency Department Emergency Medicine  If symptoms worsen Erica Remy [phone] [facility] ED, [address] [place], [place], [address]        Patient's Medications   Discharge Prescriptions    SULFAMETHOXAZOLE-TRIMETHOPRIM (BACTRIM DS) 800-160 MG PER TABLET    Take 1 tablet by mouth 2 (two) times a day for 3 days smx-tmp DS (BACTRIM) 800-160 mg tabs (1tab q12 D10)       Start Date: 11/15/2017End Date: 11/18/2017       Order Dose: 1 tablet       Quantity: 6 tablet    Refills: 0     No discharge procedures on file      ED Provider  Electronically Signed by           Jocelyn Morgan  11/15/17 1135

## 2017-11-27 ENCOUNTER — HOSPITAL ENCOUNTER (EMERGENCY)
Facility: HOSPITAL | Age: 42
Discharge: HOME | End: 2017-11-28
Attending: EMERGENCY MEDICINE | Admitting: EMERGENCY MEDICINE
Payer: COMMERCIAL

## 2017-11-27 VITALS
RESPIRATION RATE: 16 BRPM | TEMPERATURE: 97.7 F | WEIGHT: 200 LBS | DIASTOLIC BLOOD PRESSURE: 89 MMHG | OXYGEN SATURATION: 97 % | SYSTOLIC BLOOD PRESSURE: 148 MMHG | HEART RATE: 80 BPM

## 2017-11-27 DIAGNOSIS — G43.909 MIGRAINE: Primary | ICD-10-CM

## 2017-11-27 PROCEDURE — 96375 TX/PRO/DX INJ NEW DRUG ADDON: CPT

## 2017-11-27 PROCEDURE — 96374 THER/PROPH/DIAG INJ IV PUSH: CPT

## 2017-11-27 PROCEDURE — 96361 HYDRATE IV INFUSION ADD-ON: CPT

## 2017-11-27 RX ORDER — KETOROLAC TROMETHAMINE 30 MG/ML
30 INJECTION, SOLUTION INTRAMUSCULAR; INTRAVENOUS ONCE
Status: COMPLETED | OUTPATIENT
Start: 2017-11-27 | End: 2017-11-27

## 2017-11-27 RX ORDER — DEXAMETHASONE SODIUM PHOSPHATE 10 MG/ML
10 INJECTION, SOLUTION INTRAMUSCULAR; INTRAVENOUS ONCE
Status: COMPLETED | OUTPATIENT
Start: 2017-11-27 | End: 2017-11-27

## 2017-11-27 RX ORDER — DIPHENHYDRAMINE HYDROCHLORIDE 50 MG/ML
25 INJECTION INTRAMUSCULAR; INTRAVENOUS ONCE
Status: COMPLETED | OUTPATIENT
Start: 2017-11-27 | End: 2017-11-27

## 2017-11-27 RX ORDER — BUTALBITAL, ACETAMINOPHEN AND CAFFEINE 50; 325; 40 MG/1; MG/1; MG/1
1 TABLET ORAL ONCE
Status: COMPLETED | OUTPATIENT
Start: 2017-11-27 | End: 2017-11-27

## 2017-11-27 RX ORDER — METOCLOPRAMIDE HYDROCHLORIDE 5 MG/ML
10 INJECTION INTRAMUSCULAR; INTRAVENOUS ONCE
Status: COMPLETED | OUTPATIENT
Start: 2017-11-27 | End: 2017-11-27

## 2017-11-27 RX ADMIN — SODIUM CHLORIDE 1000 ML: 0.9 INJECTION, SOLUTION INTRAVENOUS at 23:15

## 2017-11-27 RX ADMIN — BUTALBITAL, ACETAMINOPHEN, AND CAFFEINE 1 TABLET: 50; 325; 40 TABLET ORAL at 23:49

## 2017-11-27 RX ADMIN — KETOROLAC TROMETHAMINE 30 MG: 30 INJECTION, SOLUTION INTRAMUSCULAR at 23:13

## 2017-11-27 RX ADMIN — DIPHENHYDRAMINE HYDROCHLORIDE 25 MG: 50 INJECTION, SOLUTION INTRAMUSCULAR; INTRAVENOUS at 23:14

## 2017-11-27 RX ADMIN — METOCLOPRAMIDE 10 MG: 5 INJECTION, SOLUTION INTRAMUSCULAR; INTRAVENOUS at 23:13

## 2017-11-27 RX ADMIN — DEXAMETHASONE SODIUM PHOSPHATE 10 MG: 10 INJECTION, SOLUTION INTRAMUSCULAR; INTRAVENOUS at 23:49

## 2017-11-28 PROCEDURE — 99283 EMERGENCY DEPT VISIT LOW MDM: CPT

## 2017-11-28 RX ORDER — BUTALBITAL, ACETAMINOPHEN AND CAFFEINE 50; 325; 40 MG/1; MG/1; MG/1
1 TABLET ORAL EVERY 4 HOURS PRN
Qty: 30 TABLET | Refills: 0 | Status: SHIPPED | OUTPATIENT
Start: 2017-11-28 | End: 2018-02-07 | Stop reason: ALTCHOICE

## 2017-11-28 NOTE — ED PROVIDER NOTES
History  Chief Complaint   Patient presents with    Migraine     patient reports migraine headache since today  sensitivity to light and sound  denies nausea and vomiting  44 yo female with migraine headaches who developed migraine this am   Developed gradually - started behind eyes and now entire head  Similar to previous  Denies fever or chills, no neck stiffness  + sensitivity to light and sound, mild dizziness and nausea at times  History provided by:  Patient and spouse   used: No    Migraine   Severity:  Moderate  Onset quality:  Gradual  Duration:  12 hours  Timing:  Constant  Progression:  Worsening  Chronicity:  Recurrent  Associated symptoms: headaches (global) and nausea    Associated symptoms: no abdominal pain, no chest pain, no cough, no diarrhea, no ear pain, no fatigue, no fever, no rash, no rhinorrhea, no shortness of breath, no sore throat, no vomiting and no wheezing        Prior to Admission Medications   Prescriptions Last Dose Informant Patient Reported? Taking? DULoxetine (CYMBALTA) 60 mg delayed release capsule 11/27/2017 at Unknown time  Yes Yes   Sig: Take 60 mg by mouth daily  QUEtiapine Fumarate (SEROQUEL PO) 11/27/2017 at Unknown time  Yes Yes   Sig: Take 800 mg by mouth daily at bedtime  Riboflavin 400 MG CAPS   No No   Sig: Take 1 capsule by mouth daily   SUMAtriptan (IMITREX) 100 mg tablet 11/27/2017 at Unknown time  No Yes   Sig: Take 1 tablet by mouth once as needed for migraine for up to 1 dose   acetaminophen (TYLENOL) 325 mg tablet   No No   Sig: Take 2 tablets by mouth every 6 (six) hours as needed for mild pain or fever   albuterol (PROVENTIL HFA,VENTOLIN HFA) 90 mcg/act inhaler 11/27/2017 at Unknown time  Yes Yes   Sig: Inhale 2 puffs   clonazePAM (KlonoPIN) 2 mg tablet 11/27/2017 at Unknown time  Yes Yes   Sig: Take 2 mg by mouth 2 (two) times a day as needed for seizures     estradiol (ESTRACE) 1 mg tablet   Yes No   Sig: Take 2 mg by mouth   ibuprofen (MOTRIN) 400 mg tablet   No No   Sig: Take 1 tablet by mouth every 6 (six) hours as needed for mild pain for up to 7 days   magnesium oxide (MAG-OX) 400 mg 11/27/2017 at Unknown time  No Yes   Sig: Take 1 tablet by mouth daily      Facility-Administered Medications: None       Past Medical History:   Diagnosis Date    Asthma     Migraine     Pituitary tumor     Psychiatric disorder     Depression       Past Surgical History:   Procedure Laterality Date    HYSTERECTOMY         History reviewed  No pertinent family history  I have reviewed and agree with the history as documented  Social History   Substance Use Topics    Smoking status: Never Smoker    Smokeless tobacco: Never Used    Alcohol use No        Review of Systems   Constitutional: Negative for appetite change, chills, fatigue and fever  HENT: Negative for ear pain, rhinorrhea and sore throat  Eyes: Positive for photophobia  Negative for visual disturbance  Respiratory: Negative for cough, shortness of breath and wheezing  Cardiovascular: Negative for chest pain  Gastrointestinal: Positive for nausea  Negative for abdominal pain, diarrhea and vomiting  Genitourinary: Negative for dysuria, frequency, vaginal bleeding and vaginal discharge  Musculoskeletal: Negative for back pain, neck pain and neck stiffness  Skin: Negative for pallor and rash  Allergic/Immunologic: Negative for immunocompromised state  Neurological: Positive for headaches (global)  Negative for light-headedness  Psychiatric/Behavioral: Negative for confusion  All other systems reviewed and are negative        Physical Exam  ED Triage Vitals   Temperature Pulse Respirations Blood Pressure SpO2   11/27/17 2202 11/27/17 2202 11/27/17 2202 11/27/17 2202 11/27/17 2202   97 7 °F (36 5 °C) 78 16 117/70 97 %      Temp Source Heart Rate Source Patient Position - Orthostatic VS BP Location FiO2 (%)   11/27/17 2202 11/27/17 2352 11/27/17 2202 11/27/17 2202 --   Oral Monitor Sitting Right arm       Pain Score       --                  Orthostatic Vital Signs  Vitals:    11/27/17 2202 11/27/17 2352   BP: 117/70 148/89   Pulse: 78 80   Patient Position - Orthostatic VS: Sitting        Physical Exam   Constitutional: She is oriented to person, place, and time  She appears well-developed and well-nourished  No distress (mildly uncomfortable)  HENT:   Head: Normocephalic and atraumatic  Right Ear: External ear normal    Left Ear: External ear normal    Mouth/Throat: Oropharynx is clear and moist    Eyes: EOM are normal  Pupils are equal, round, and reactive to light  Mild photophobia   Neck: Normal range of motion  Neck supple  Cardiovascular: Normal rate and regular rhythm  No murmur heard  Pulmonary/Chest: Effort normal and breath sounds normal  No respiratory distress  Abdominal: Soft  Bowel sounds are normal    Musculoskeletal: Normal range of motion  Neurological: She is alert and oriented to person, place, and time  She displays normal reflexes  No cranial nerve deficit or sensory deficit  She exhibits normal muscle tone  Coordination normal    Skin: Skin is warm  Capillary refill takes less than 2 seconds  No rash noted  No pallor  Psychiatric: She has a normal mood and affect  Her behavior is normal    Nursing note and vitals reviewed        ED Medications  Medications   sodium chloride 0 9 % bolus 1,000 mL (0 mL Intravenous Stopped 11/28/17 0031)   ketorolac (TORADOL) 30 mg/mL injection 30 mg (30 mg Intravenous Given 11/27/17 2313)   metoclopramide (REGLAN) injection 10 mg (10 mg Intravenous Given 11/27/17 2313)   diphenhydrAMINE (BENADRYL) injection 25 mg (25 mg Intravenous Given 11/27/17 2314)   dexamethasone (PF) (DECADRON) injection 10 mg (10 mg Intravenous Given 11/27/17 2349)   butalbital-acetaminophen-caffeine (FIORICET,ESGIC) -40 mg per tablet 1 tablet (1 tablet Oral Given 11/27/17 2349)       Diagnostic Studies  Results Reviewed     None                 No orders to display              Procedures  Procedures       Phone Contacts  ED Phone Contact    ED Course  ED Course as of Nov 28 0054   Mon Nov 27, 2017   2245 Pt seen and examined  42 yo female with migraine headaches who developed migraine this am   Developed gradually - started behind eyes and now entire head  Similar to previous  Denies fever or chills, no neck stiffness  + sensitivity to light and sound, mild dizziness and nausea at times  Will give IVF, toradol, reglan and benadryl  Pt s/p hysterectomy  2342 Pt has gotten minimal relief from meds, IVF infusing  Will give decadron and fiorocet  Tue Nov 28, 2017   0028 Pt feels great, will d/c home on prn fiorocet  MDM  CritCare Time    Disposition  Final diagnoses:   Migraine     Time reflects when diagnosis was documented in both MDM as applicable and the Disposition within this note     Time User Action Codes Description Comment    11/28/2017 12:28 AM Esteban Harvey Add [G43 [id number]] Migraine       ED Disposition     ED Disposition Condition Comment    Discharge  Pancho Mercury discharge to home/self care      Condition at discharge: Good        Follow-up Information     Follow up With Specialties Details Why [facility] Internal Medicine Call As needed [address] [place] [address]  [phone]          Discharge Medication List as of 11/28/2017 12:28 AM      START taking these medications    Details   butalbital-acetaminophen-caffeine (FIORICET,ESGIC) -40 mg per tablet Take 1 tablet by mouth every 4 (four) hours as needed for headaches, Starting Tue 11/28/2017, Print         CONTINUE these medications which have NOT CHANGED    Details   albuterol (PROVENTIL HFA,VENTOLIN HFA) 90 mcg/act inhaler Inhale 2 puffs, Starting 1/3/2017, Until Discontinued, Historical Med      clonazePAM (KlonoPIN) 2 mg tablet Take 2 mg by mouth 2 (two) times a day as needed for seizures  , Until Discontinued, Historical Med      DULoxetine (CYMBALTA) 60 mg delayed release capsule Take 60 mg by mouth daily  , Until Discontinued, Historical Med      magnesium oxide (MAG-OX) 400 mg Take 1 tablet by mouth daily, Starting Wed 9/20/2017, Print      QUEtiapine Fumarate (SEROQUEL PO) Take 800 mg by mouth daily at bedtime  , Until Discontinued, Historical Med      SUMAtriptan (IMITREX) 100 mg tablet Take 1 tablet by mouth once as needed for migraine for up to 1 dose, Starting Wed 8/16/2017, Print      acetaminophen (TYLENOL) 325 mg tablet Take 2 tablets by mouth every 6 (six) hours as needed for mild pain or fever, Starting Wed 9/20/2017, Print      estradiol (ESTRACE) 1 mg tablet Take 2 mg by mouth, Starting 4/15/2015, Until Discontinued, Historical Med      ibuprofen (MOTRIN) 400 mg tablet Take 1 tablet by mouth every 6 (six) hours as needed for mild pain for up to 7 days, Starting Wed 9/20/2017, Until Wed 9/27/2017, Print      Riboflavin 400 MG CAPS Take 1 capsule by mouth daily, Starting Wed 9/20/2017, Print           No discharge procedures on file      ED Provider  Electronically Signed by           German Weinstein DO  11/28/17 6197

## 2017-11-28 NOTE — DISCHARGE INSTRUCTIONS
Migraña   LO QUE NECESITA SABER:   [place] [employer] es un dolor de nicolás intenso  El dolor puede ser tan severo que interfiere con antonieta actividades cotidianas  [place] [employer] puede durar desde pocas horas hasta varios días  La causa exacta de la migraña no es conocida  INSTRUCCIONES SOBRE [facility]ITAL[facility]:   [place] atención médica de inmediato si:   · Usted tiene dolor de nicolás que parece ser diferente o mucho peor que quinn migraña habitual     · Usted tiene un dolor de nicolás severo con fiebre o rigidez en el tobin  · Usted tiene nuevos problemas con el habla, la visión, el equilibrio o el [address]  · Usted siente que se va a desmayar, se siente confundido o sufre boo convulsión  Comuníquese con quinn médico o neurólogo si:   · Quinn migraña interfiere con antonieta actividades cotidianas  · Anton[place] medicamentos o tratamientos asher de funcionar  · Usted tiene preguntas o inquietudes acerca de quinn condición o cuidado  Medicamentos:  [place] medicamento tan pronto keaton sienta que le comienza [place] [employer]  · Un medicamento con receta para el dolor  podrían ser [place]  No espere a que el dolor sea muy intenso para bruce el medicamento  · Medicamentos para la migraña  se [place] para evitar boo migraña o detenerla boo vez que comience  · Los medicamentos contra las náuseas  pueden darse para calmar quinn estómago y ayudarle a prevenir los vómitos  [place] medicamento también puede aliviar el dolor  · [place] antonieta medicamentos keaton se le haya indicado  Llame a quinn médico si usted piensa que el medicamento no está ayudando o si tiene efectos secundarios  Infórmele si es alérgico a cualquier medicamento  Mantenga boo lista actualizada de los [place], las vitaminas y los productos herbales que kelsi  Incluya los siguientes datos de los medicamentos: cantidad, frecuencia y motivo de administración  Traiga con usted la lista o los envases de la píldoras a antonieta citas de seguimiento   Lleve la lista de los medicamentos con usted en reagan de boo emergencia  [place] de quinn síntomas:   · Repose en boo habitación oscura y [place]  [place] ayudará a disminuir el dolor  El dormir también podría ayudarlo a aliviar quinn dolor  · Aplique hielo para reducir el dolor  Use un paquete de hielo o ponga hielo molido dentro de [employer]  Cubra el paquete con hielo con boo toalla y colóquela en quinn nicolás donde siente el dolor por 15 a 20 minutos cada hora  · Aplique calor para disminuir el dolor y los espasmos musculares  Utilice boo toalla pequeña empapada con [place], boo almohada térmica o tome un baño de manav con agua tibia  Aplique la compresa caliente sobre el área por 20 a 30 minutos cada 2 horas  Usted puede alternar el calor y el hielo  · Mantenga un registro de las migrañas  Escriba cuándo comienzan y terminan hui migrañas  Randye Rinks y [place] haciendo cuando comenzó [place]  Registre lo que comió y lo que tomó las 24 horas antes de que comenzó quinn migraña  Mantenga un registro de lo que hizo para tratar quinn migraña y si funcionó  Programe boo troy con quinn médico o quinn neurólogo keaton se le indique:  Lleve hui registros de migrañas con usted cada vez que visite a quinn médico  Anote hui preguntas para que se acuerde de hacerlas quita hui visitas  Evite otra migraña:   · No fume  La nicotina y otros químicos en los cigarrillos y cigarros pueden desencadenar boo Verlinda Splinter y [place] provocar daño al pulmón  Pida información a quinn médico si usted actualmente fuma y necesita ayuda para dejar de fumar  Los cigarrillos electrónicos o tabaco sin humo todavía contienen nicotina  Consulte con quinn médico antes de [employer]  · No tome alcohol  El alcohol puede provocar migraña  También es posible que interfiera con los medicamentos utilizados para tratar quinn migraña  · Ejercítese regularmente  El ejercicio puede ayudar a evitar migrañas   Consulte con quinn médico acerca de cuál es el mejor [place] de ejercicio para usted  · Controle el estrés  El estrés podría provocar migraña  Aprenda nuevas maneras de relajarse keaton la respiración profunda  · Establezca un horario para dormir  Acuéstese y levántese a la misma hora cada día  · Coma hui comidas regularmente  Incluya alimentos [employer] fruta, verduras, panes de grano entero, productos lácteos bajos en grasa, frijoles, carne magra y pescado  No consuma alimentos o bebidas que puedan desencadenar hui migrañas  © 2016 [address] is for End User's use only and may not be sold, redistributed or otherwise used for commercial purposes  All illustrations and images included in CareNotes® are the copyrighted property of A D A M , Inc  or Dmitriy Swenson  Esta información es sólo para uso en educación  Quinn intención no es darle un consejo médico sobre enfermedades o tratamientos  Colsulte con quinn Carollee [facility]Heart of the [facility] farmacéutico antes de seguir cualquier régimen médico para saber si es seguro y efectivo para usted

## 2017-11-28 NOTE — ED NOTES
Pt states that she started with a migraine headache today  Pt took alive at home and it did not help  Pt is prescribed imatrx or Maxalt for her headaches but states she does not have any more at home        Johnna العلي, UMU  11/27/17 [address], RN  11/27/17 1364

## 2018-01-02 ENCOUNTER — HOSPITAL ENCOUNTER (EMERGENCY)
Facility: HOSPITAL | Age: 43
Discharge: HOME | End: 2018-01-02
Attending: EMERGENCY MEDICINE | Admitting: EMERGENCY MEDICINE
Payer: COMMERCIAL

## 2018-01-02 VITALS
DIASTOLIC BLOOD PRESSURE: 85 MMHG | TEMPERATURE: 97.3 F | OXYGEN SATURATION: 98 % | WEIGHT: 204 LBS | SYSTOLIC BLOOD PRESSURE: 124 MMHG | HEART RATE: 87 BPM | RESPIRATION RATE: 18 BRPM

## 2018-01-02 DIAGNOSIS — T23.251A PARTIAL THICKNESS BURN OF PALM OF RIGHT HAND, INITIAL ENCOUNTER: Primary | ICD-10-CM

## 2018-01-02 PROCEDURE — 99283 EMERGENCY DEPT VISIT LOW MDM: CPT

## 2018-01-02 RX ORDER — BACITRACIN, NEOMYCIN, POLYMYXIN B 400; 3.5; 5 [USP'U]/G; MG/G; [USP'U]/G
1 OINTMENT TOPICAL ONCE
Status: COMPLETED | OUTPATIENT
Start: 2018-01-02 | End: 2018-01-02

## 2018-01-02 RX ORDER — IBUPROFEN 200 MG
TABLET ORAL 2 TIMES DAILY
Qty: 28.3 G | Refills: 0 | Status: SHIPPED | OUTPATIENT
Start: 2018-01-02 | End: 2018-01-14

## 2018-01-02 RX ORDER — TRAMADOL HYDROCHLORIDE 50 MG/1
50 TABLET ORAL EVERY 6 HOURS PRN
Qty: 12 TABLET | Refills: 0 | Status: SHIPPED | OUTPATIENT
Start: 2018-01-02 | End: 2018-01-14

## 2018-01-02 RX ORDER — NAPROXEN 500 MG/1
500 TABLET ORAL 2 TIMES DAILY WITH MEALS
Qty: 20 TABLET | Refills: 0 | Status: SHIPPED | OUTPATIENT
Start: 2018-01-02 | End: 2018-01-14

## 2018-01-02 RX ORDER — TRAMADOL HYDROCHLORIDE 50 MG/1
50 TABLET ORAL ONCE
Status: COMPLETED | OUTPATIENT
Start: 2018-01-02 | End: 2018-01-02

## 2018-01-02 RX ADMIN — BACITRACIN, NEOMYCIN, POLYMYXIN B 1 SMALL APPLICATION: 400; 3.5; 5 OINTMENT TOPICAL at 17:28

## 2018-01-02 RX ADMIN — TRAMADOL HYDROCHLORIDE 50 MG: 50 TABLET, FILM COATED ORAL at 17:27

## 2018-01-02 NOTE — ED NOTES
Topical ointment applied to wound and wound dressed with telfa, sterile gauze and cling        Marcy Haywood RN  01/02/18 0971

## 2018-01-02 NOTE — ED PROVIDER NOTES
History  Chief Complaint   Patient presents with    Burn     Began to fall and placed hand onto hot stove 2 days ago  Burn noted to right hand  Reports that she peeled off blister formation and is concerned with infection and can no longer tlerate the pain  Has been applying neosporin to wound and keeping covered with absorbant dressing  44 YO female presents with pain in the Right palm after sustaining a burn 2 days ago  Pt states she had a mechanical fall in the kitchen, reached out with the Right hand which landed on the stove  Pt had immediate pain to the area, states a large blister formed which she punctured and removed the overlying skin  Pt has been applying Abx ointment and dressings to the area  Pt presents 2/2 pain in the hand as well as concern for infection  She denies fevers, no swelling to the area, wound has been weeping some serous fluid  Pt denies CP/SOB/F/C/N/V/D/C, no dysuria, burning on urination or blood in urine  History provided by:  Patient   used: No    Burn   Burn location:  Hand  Hand burn location:  R palm  Burn quality:  Ruptured blister and painful  Time since incident:  2 days  Progression:  Unchanged  Pain details:     Severity:  Moderate    Duration:  2 days    Timing:  Constant    Progression:  Unchanged  Mechanism of burn:  Hot surface  Incident location:  Home  Relieved by:  Nothing  Worsened by:  Nothing  Ineffective treatments:  None tried  Associated symptoms: no shortness of breath        Prior to Admission Medications   Prescriptions Last Dose Informant Patient Reported? Taking? DULoxetine (CYMBALTA) 60 mg delayed release capsule   Yes No   Sig: Take 60 mg by mouth daily  QUEtiapine Fumarate (SEROQUEL PO)   Yes No   Sig: Take 800 mg by mouth daily at bedtime     Riboflavin 400 MG CAPS   No No   Sig: Take 1 capsule by mouth daily   SUMAtriptan (IMITREX) 100 mg tablet   No No   Sig: Take 1 tablet by mouth once as needed for migraine for up to 1 dose   acetaminophen (TYLENOL) 325 mg tablet   No No   Sig: Take 2 tablets by mouth every 6 (six) hours as needed for mild pain or fever   albuterol (PROVENTIL HFA,VENTOLIN HFA) 90 mcg/act inhaler   Yes No   Sig: Inhale 2 puffs   butalbital-acetaminophen-caffeine (FIORICET,ESGIC) -40 mg per tablet   No No   Sig: Take 1 tablet by mouth every 4 (four) hours as needed for headaches   clonazePAM (KlonoPIN) 2 mg tablet   Yes No   Sig: Take 2 mg by mouth 2 (two) times a day as needed for seizures  estradiol (ESTRACE) 1 mg tablet   Yes No   Sig: Take 2 mg by mouth   ibuprofen (MOTRIN) 400 mg tablet   No No   Sig: Take 1 tablet by mouth every 6 (six) hours as needed for mild pain for up to 7 days   magnesium oxide (MAG-OX) 400 mg   No No   Sig: Take 1 tablet by mouth daily      Facility-Administered Medications: None       Past Medical History:   Diagnosis Date    Asthma     Migraine     Pituitary tumor     Psychiatric disorder     Depression       Past Surgical History:   Procedure Laterality Date    HYSTERECTOMY         History reviewed  No pertinent family history  I have reviewed and agree with the history as documented  Social History   Substance Use Topics    Smoking status: Never Smoker    Smokeless tobacco: Never Used    Alcohol use No        Review of Systems   Constitutional: Negative for chills, fatigue and fever  HENT: Negative for dental problem  Eyes: Negative for visual disturbance  Respiratory: Negative for shortness of breath  Cardiovascular: Negative for chest pain  Gastrointestinal: Negative for abdominal pain, diarrhea and vomiting  Genitourinary: Negative for dysuria and frequency  Musculoskeletal: Negative for arthralgias  Skin: Negative for rash  Neurological: Negative for dizziness, weakness and light-headedness  Psychiatric/Behavioral: Negative for agitation, behavioral problems and confusion     All other systems reviewed and are negative  Physical Exam  ED Triage Vitals   Temperature Pulse Respirations Blood Pressure SpO2   01/02/18 1543 01/02/18 1541 01/02/18 1541 01/02/18 1541 01/02/18 1541   (!) 97 3 °F (36 3 °C) 84 16 131/90 99 %      Temp Source Heart Rate Source Patient Position - Orthostatic VS BP Location FiO2 (%)   01/02/18 1541 01/02/18 1541 01/02/18 1541 01/02/18 1541 --   Oral Monitor Sitting Right arm       Pain Score       01/02/18 1541       9           Orthostatic Vital Signs  Vitals:    01/02/18 1541   BP: 131/90   Pulse: 84   Patient Position - Orthostatic VS: Sitting       Physical Exam   Constitutional: She is oriented to person, place, and time  She appears well-developed and well-nourished  HENT:   Head: Normocephalic and atraumatic  Eyes: EOM are normal    Neck: Normal range of motion  Cardiovascular: Normal rate, regular rhythm and normal heart sounds  Pulmonary/Chest: Effort normal and breath sounds normal    Abdominal: Soft  Musculoskeletal: Normal range of motion  Neurological: She is alert and oriented to person, place, and time  Skin: Skin is warm and dry  2nd degree partial thickness burn to the Right palm, no erythema or induration  Psychiatric: She has a normal mood and affect  Her behavior is normal  Thought content normal    Nursing note and vitals reviewed  ED Medications  Medications   traMADol (ULTRAM) tablet 50 mg (not administered)   neomycin-bacitracin-polymyxin b (NEOSPORIN) ointment 1 small application (not administered)       Diagnostic Studies  Results Reviewed     None                 No orders to display              Procedures  Procedures       Phone Contacts  ED Phone Contact    ED Course  ED Course                                MDM  Number of Diagnoses or Management Options  Partial thickness burn of palm of right hand, initial encounter: new and requires workup  Diagnosis management comments: 1   Burn to Right palm - This is not circumferential, no induration or erythema, mild serous drainage  There does not appear to be infection  Will give the [facility] for [facility], give NSAIDs, tramadol for breakthrough, Abx ointment  Patient Progress  Patient progress: stable    CritCare Time    Disposition  Final diagnoses:   Partial thickness burn of palm of right hand, initial encounter     Time reflects when diagnosis was documented in both MDM as applicable and the Disposition within this note     Time User Action Codes Description Comment    1/2/2018  4:45 PM Natividad, Gentry Marx Partial thickness burn of palm of right hand, initial encounter       ED Disposition     ED Disposition Condition Comment    Discharge  Hasmukh Soni discharge to home/self care  Condition at discharge: Stable        Follow-up Information     Follow up With Specialties Details Why Contact Info    Valerie Ricketts MD Trauma Surgery, Burn Surgery Schedule an appointment as soon as possible for a visit  [address] [place] [address]          Patient's Medications   Discharge Prescriptions    BACITRACIN-POLYMYXIN B (POLYSPORIN) OPHTHALMIC OINTMENT    Apply to eye 2 (two) times a day       Start Date: 1/2/2018  End Date: --       Order Dose: --       Quantity: 3 5 g    Refills: 0    NAPROXEN (NAPROSYN) 500 MG TABLET    Take 1 tablet by mouth 2 (two) times a day with meals for 10 days       Start Date: 1/2/2018  End Date: 1/12/2018       Order Dose: 500 mg       Quantity: 20 tablet    Refills: 0    TRAMADOL (ULTRAM) 50 MG TABLET    Take 1 tablet by mouth every 6 (six) hours as needed for moderate pain for up to 12 doses       Start Date: 1/2/2018  End Date: --       Order Dose: 50 mg       Quantity: 12 tablet    Refills: 0     No discharge procedures on file      ED Provider  Electronically Signed by           Cr Sam MD  01/02/18 9845
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
## 2018-01-14 ENCOUNTER — APPOINTMENT (EMERGENCY)
Dept: RADIOLOGY | Facility: HOSPITAL | Age: 43
End: 2018-01-14
Payer: COMMERCIAL

## 2018-01-14 ENCOUNTER — HOSPITAL ENCOUNTER (EMERGENCY)
Facility: HOSPITAL | Age: 43
Discharge: HOME | End: 2018-01-14
Admitting: EMERGENCY MEDICINE
Payer: COMMERCIAL

## 2018-01-14 VITALS
OXYGEN SATURATION: 100 % | SYSTOLIC BLOOD PRESSURE: 114 MMHG | DIASTOLIC BLOOD PRESSURE: 67 MMHG | WEIGHT: 194.89 LBS | TEMPERATURE: 97.7 F | HEART RATE: 90 BPM | RESPIRATION RATE: 18 BRPM

## 2018-01-14 DIAGNOSIS — J40 BRONCHITIS: Primary | ICD-10-CM

## 2018-01-14 PROCEDURE — 71046 X-RAY EXAM CHEST 2 VIEWS: CPT

## 2018-01-14 PROCEDURE — 99283 EMERGENCY DEPT VISIT LOW MDM: CPT

## 2018-01-14 RX ORDER — AZITHROMYCIN 250 MG/1
TABLET, FILM COATED ORAL
Qty: 6 TABLET | Refills: 0 | Status: SHIPPED | OUTPATIENT
Start: 2018-01-14 | End: 2018-01-18

## 2018-01-14 RX ORDER — PREDNISONE 50 MG/1
50 TABLET ORAL DAILY
Qty: 4 TABLET | Refills: 0 | Status: SHIPPED | OUTPATIENT
Start: 2018-01-15 | End: 2018-01-19

## 2018-01-14 RX ADMIN — PREDNISONE 50 MG: 20 TABLET ORAL at 17:23

## 2018-01-14 NOTE — ED PROVIDER NOTES
History  Chief Complaint   Patient presents with    Cough     Patient has had productive cough for 3 weeks  Patient has been taking OTC meds with no relief  Patient denies fevers at home  Patient is a 28-year-old female with past medical history of asthma who presents for evaluation of cough  She states she has had a cough for 3 weeks  Occasionally is productive of mucus  She states it is a constant irritating cough  She has used her inhaler without significant relief  She last used it today approximately 2-3 hours ago  She has also been using over-the-counter cough medications without relief  She denies fever, chills, nausea vomiting diarrhea, chest pain, shortness breath, wheezing, hemoptysis, sore throat, nasal congestion, ear pain, leg pain or swelling, history of DVT or PE  She has never been hospitalized or intubated for her asthma  There has been no recent travel  She is up-to-date on immunizations  Prior to Admission Medications   Prescriptions Last Dose Informant Patient Reported? Taking? DULoxetine (CYMBALTA) 60 mg delayed release capsule   Yes Yes   Sig: Take 60 mg by mouth daily  QUEtiapine Fumarate (SEROQUEL PO)   Yes Yes   Sig: Take 800 mg by mouth daily at bedtime  SUMAtriptan (IMITREX) 100 mg tablet   No Yes   Sig: Take 1 tablet by mouth once as needed for migraine for up to 1 dose   acetaminophen (TYLENOL) 325 mg tablet   No Yes   Sig: Take 2 tablets by mouth every 6 (six) hours as needed for mild pain or fever   albuterol (PROVENTIL HFA,VENTOLIN HFA) 90 mcg/act inhaler   Yes Yes   Sig: Inhale 2 puffs   butalbital-acetaminophen-caffeine (FIORICET,ESGIC) -40 mg per tablet   No Yes   Sig: Take 1 tablet by mouth every 4 (four) hours as needed for headaches   clonazePAM (KlonoPIN) 2 mg tablet   Yes Yes   Sig: Take 2 mg by mouth 2 (two) times a day as needed for seizures     estradiol (ESTRACE) 1 mg tablet   Yes Yes   Sig: Take 2 mg by mouth Facility-Administered Medications: None       Past Medical History:   Diagnosis Date    Asthma     Migraine     Pituitary tumor     Psychiatric disorder     Depression       Past Surgical History:   Procedure Laterality Date    HYSTERECTOMY         History reviewed  No pertinent family history  I have reviewed and agree with the history as documented  Social History   Substance Use Topics    Smoking status: Never Smoker    Smokeless tobacco: Never Used    Alcohol use No        Review of Systems   Constitutional: Negative for chills and fever  HENT: Negative for congestion, sinus pain and sore throat  Respiratory: Positive for cough  Negative for chest tightness, shortness of breath and wheezing  Cardiovascular: Negative for chest pain and leg swelling  Gastrointestinal: Negative for abdominal pain, diarrhea, nausea and vomiting  Musculoskeletal: Negative for neck pain and neck stiffness  Skin: Negative for rash  Neurological: Negative for syncope  All other systems reviewed and are negative  Physical Exam  ED Triage Vitals [01/14/18 1554]   Temperature Pulse Respirations Blood Pressure SpO2   97 7 °F (36 5 °C) 90 18 114/67 95 %      Temp Source Heart Rate Source Patient Position - Orthostatic VS BP Location FiO2 (%)   Oral Monitor Sitting Right arm --      Pain Score       No Pain           Orthostatic Vital Signs  Vitals:    01/14/18 1554   BP: 114/67   Pulse: 90   Patient Position - Orthostatic VS: Sitting       Physical Exam   Constitutional: She is oriented to person, place, and time  Vital signs are normal  She appears well-developed and well-nourished  She is active  Non-toxic appearance  No distress  HENT:   Head: Normocephalic and atraumatic  Right Ear: Hearing, tympanic membrane, external ear and ear canal normal    Left Ear: Hearing, tympanic membrane, external ear and ear canal normal    Nose: Nose normal  No mucosal edema or rhinorrhea     Mouth/Throat: Uvula is midline, oropharynx is clear and moist and mucous membranes are normal  No oral lesions  No trismus in the jaw  No uvula swelling  No oropharyngeal exudate, posterior oropharyngeal edema, posterior oropharyngeal erythema or tonsillar abscesses  Eyes: Conjunctivae and EOM are normal    Neck: Normal range of motion  Neck supple  Cardiovascular: Normal rate, regular rhythm and normal heart sounds  Exam reveals no gallop and no friction rub  No murmur heard  Pulmonary/Chest: Effort normal and breath sounds normal  No respiratory distress  She has no wheezes  She has no rales  Abdominal: Soft  Bowel sounds are normal  She exhibits no distension  There is no tenderness  There is no guarding  Musculoskeletal: Normal range of motion  Neurological: She is alert and oriented to person, place, and time  She is not disoriented  GCS eye subscore is 4  GCS verbal subscore is 5  GCS motor subscore is 6  Skin: Skin is warm and dry  She is not diaphoretic  Psychiatric: She has a normal mood and affect  Her behavior is normal    Nursing note and vitals reviewed  ED Medications  Medications   predniSONE tablet 50 mg (50 mg Oral Given 1/14/18 1723)       Diagnostic Studies  Results Reviewed     None                 XR chest 2 views    (Results Pending)              Procedures  Procedures       Phone Contacts  ED Phone Contact    ED Course  ED Course                                MDM  Number of Diagnoses or Management Options  Bronchitis:   Diagnosis management comments: Patient with cough for 3 weeks  She has a past medical history of asthma and has been using her help inhaler without significant relief  She has no history of hospitalization or intubation for asthma  On exam she is well-appearing, nontoxic and in no acute distress  She does have a congested-sounding cough  Lungs are clear to auscultation bilaterally without wheezes rhonchi or rale  Pulse ox on room air is 100%   Plan will be to perform a chest x-ray to rule out pneumonia  Chest x-ray reviewed by me and interpreted as no active disease  Discussed results with patient  Explained that x-rays will be further read by radiologist and if any discrepancies right should be contacted  Plan will be to treat for bronchitis with azithromycin and prednisone  First dose of prednisone given here  She has an inhaler at home and was instructed to use it for shortness of breath, chest tightness or wheezing  Follow up with her family doctor in 1 day  Return to the ED if symptoms worsen or new symptoms arise  Patient states understanding and agrees with plan  Amount and/or Complexity of Data Reviewed  Tests in the radiology section of CPT®: ordered and reviewed  Independent visualization of images, tracings, or specimens: yes    Risk of Complications, Morbidity, and/or Mortality  Presenting problems: low  Diagnostic procedures: low  Management options: low    Patient Progress  Patient progress: stable    CritCare Time    Disposition  Final diagnoses:   Bronchitis     Time reflects when diagnosis was documented in both MDM as applicable and the Disposition within this note     Time User Action Codes Description Comment    1/14/2018  5:11 PM Imelda Franz 83 Bronchitis       ED Disposition     ED Disposition Condition Comment    Discharge  Kwame Lange discharge to home/self care      Condition at discharge: Good        Follow-up Information     Follow up With Specialties Details Why Contact Info Additional [address] [facility] Schedule an appointment as soon as possible for a visit in 1 day  [place]  [place] [facility] Emergency Medicine  If symptoms worsen or new symptoms arise as discussed  Erica Remy [address] [facility] ED, [address], [place], [address] Discharge Medication List as of 1/14/2018  5:14 PM      START taking these medications    Details   azithromycin (ZITHROMAX) 250 mg tablet Take 2(two) tablets on day 1(one) and take 1 tablet on day 2-5, Print      predniSONE 50 mg tablet Take 1 tablet by mouth daily for 4 days, Starting Mon 1/15/2018, Until Fri 1/19/2018, Print         CONTINUE these medications which have NOT CHANGED    Details   acetaminophen (TYLENOL) 325 mg tablet Take 2 tablets by mouth every 6 (six) hours as needed for mild pain or fever, Starting Wed 9/20/2017, Print      albuterol (PROVENTIL HFA,VENTOLIN HFA) 90 mcg/act inhaler Inhale 2 puffs, Starting 1/3/2017, Until Discontinued, Historical Med      butalbital-acetaminophen-caffeine (FIORICET,ESGIC) -40 mg per tablet Take 1 tablet by mouth every 4 (four) hours as needed for headaches, Starting Tue 11/28/2017, Print      clonazePAM (KlonoPIN) 2 mg tablet Take 2 mg by mouth 2 (two) times a day as needed for seizures  , Until Discontinued, Historical Med      DULoxetine (CYMBALTA) 60 mg delayed release capsule Take 60 mg by mouth daily  , Until Discontinued, Historical Med      estradiol (ESTRACE) 1 mg tablet Take 2 mg by mouth, Starting 4/15/2015, Until Discontinued, Historical Med      QUEtiapine Fumarate (SEROQUEL PO) Take 800 mg by mouth daily at bedtime  , Until Discontinued, Historical Med      SUMAtriptan (IMITREX) 100 mg tablet Take 1 tablet by mouth once as needed for migraine for up to 1 dose, Starting Wed 8/16/2017, Print           No discharge procedures on file      ED Provider  Electronically Signed by           Yusuf Benitez PA-C  01/14/18 1946

## 2018-02-07 ENCOUNTER — APPOINTMENT (EMERGENCY)
Dept: CT IMAGING | Facility: HOSPITAL | Age: 43
End: 2018-02-07
Payer: COMMERCIAL

## 2018-02-07 ENCOUNTER — HOSPITAL ENCOUNTER (EMERGENCY)
Facility: HOSPITAL | Age: 43
Discharge: LEFT BEFORE DISCHARGE | End: 2018-02-07
Attending: EMERGENCY MEDICINE
Payer: COMMERCIAL

## 2018-02-07 VITALS
OXYGEN SATURATION: 99 % | SYSTOLIC BLOOD PRESSURE: 120 MMHG | HEART RATE: 85 BPM | TEMPERATURE: 97.9 F | RESPIRATION RATE: 18 BRPM | WEIGHT: 191 LBS | DIASTOLIC BLOOD PRESSURE: 75 MMHG

## 2018-02-07 DIAGNOSIS — G43.909 MIGRAINE: Primary | ICD-10-CM

## 2018-02-07 DIAGNOSIS — H53.8 BLURRED VISION: ICD-10-CM

## 2018-02-07 LAB
ANION GAP SERPL CALCULATED.3IONS-SCNC: 7 MMOL/L (ref 4–13)
ATRIAL RATE: 84 BPM
BACTERIA UR QL AUTO: ABNORMAL /HPF
BASOPHILS # BLD AUTO: 0.01 THOUSANDS/ΜL (ref 0–0.1)
BASOPHILS NFR BLD AUTO: 0 % (ref 0–1)
BILIRUB UR QL STRIP: NEGATIVE
BUN SERPL-MCNC: 11 MG/DL (ref 5–25)
CALCIUM SERPL-MCNC: 8.6 MG/DL (ref 8.3–10.1)
CHLORIDE SERPL-SCNC: 107 MMOL/L (ref 100–108)
CLARITY UR: CLEAR
CO2 SERPL-SCNC: 28 MMOL/L (ref 21–32)
COLOR UR: YELLOW
CREAT SERPL-MCNC: 0.93 MG/DL (ref 0.6–1.3)
EOSINOPHIL # BLD AUTO: 0.09 THOUSAND/ΜL (ref 0–0.61)
EOSINOPHIL NFR BLD AUTO: 1 % (ref 0–6)
ERYTHROCYTE [DISTWIDTH] IN BLOOD BY AUTOMATED COUNT: 14.3 % (ref 11.6–15.1)
EXT PREG TEST URINE: NEGATIVE
GFR SERPL CREATININE-BSD FRML MDRD: 76 ML/MIN/1.73SQ M
GLUCOSE SERPL-MCNC: 98 MG/DL (ref 65–140)
GLUCOSE UR STRIP-MCNC: NEGATIVE MG/DL
HCT VFR BLD AUTO: 38.8 % (ref 34.8–46.1)
HGB BLD-MCNC: 12.8 G/DL (ref 11.5–15.4)
HGB UR QL STRIP.AUTO: NEGATIVE
KETONES UR STRIP-MCNC: NEGATIVE MG/DL
LEUKOCYTE ESTERASE UR QL STRIP: ABNORMAL
LYMPHOCYTES # BLD AUTO: 2.11 THOUSANDS/ΜL (ref 0.6–4.47)
LYMPHOCYTES NFR BLD AUTO: 33 % (ref 14–44)
MCH RBC QN AUTO: 31.1 PG (ref 26.8–34.3)
MCHC RBC AUTO-ENTMCNC: 33 G/DL (ref 31.4–37.4)
MCV RBC AUTO: 94 FL (ref 82–98)
MONOCYTES # BLD AUTO: 0.57 THOUSAND/ΜL (ref 0.17–1.22)
MONOCYTES NFR BLD AUTO: 9 % (ref 4–12)
NEUTROPHILS # BLD AUTO: 3.6 THOUSANDS/ΜL (ref 1.85–7.62)
NEUTS SEG NFR BLD AUTO: 57 % (ref 43–75)
NITRITE UR QL STRIP: NEGATIVE
NON-SQ EPI CELLS URNS QL MICRO: ABNORMAL /HPF
NRBC BLD AUTO-RTO: 0 /100 WBCS
P AXIS: 63 DEGREES
PH UR STRIP.AUTO: 6.5 [PH] (ref 4.5–8)
PLATELET # BLD AUTO: 207 THOUSANDS/UL (ref 149–390)
PMV BLD AUTO: 10.7 FL (ref 8.9–12.7)
POTASSIUM SERPL-SCNC: 3.9 MMOL/L (ref 3.5–5.3)
PR INTERVAL: 154 MS
PROT UR STRIP-MCNC: NEGATIVE MG/DL
QRS AXIS: 34 DEGREES
QRSD INTERVAL: 88 MS
QT INTERVAL: 374 MS
QTC INTERVAL: 441 MS
RBC # BLD AUTO: 4.12 MILLION/UL (ref 3.81–5.12)
RBC #/AREA URNS AUTO: ABNORMAL /HPF
SODIUM SERPL-SCNC: 142 MMOL/L (ref 136–145)
SP GR UR STRIP.AUTO: 1.02 (ref 1–1.03)
T WAVE AXIS: 26 DEGREES
UROBILINOGEN UR QL STRIP.AUTO: 0.2 E.U./DL
VENTRICULAR RATE: 84 BPM
WBC # BLD AUTO: 6.38 THOUSAND/UL (ref 4.31–10.16)
WBC #/AREA URNS AUTO: ABNORMAL /HPF

## 2018-02-07 PROCEDURE — 85025 COMPLETE CBC W/AUTO DIFF WBC: CPT | Performed by: EMERGENCY MEDICINE

## 2018-02-07 PROCEDURE — 96361 HYDRATE IV INFUSION ADD-ON: CPT

## 2018-02-07 PROCEDURE — 70450 CT HEAD/BRAIN W/O DYE: CPT

## 2018-02-07 PROCEDURE — 80048 BASIC METABOLIC PNL TOTAL CA: CPT | Performed by: EMERGENCY MEDICINE

## 2018-02-07 PROCEDURE — 93010 ELECTROCARDIOGRAM REPORT: CPT | Performed by: INTERNAL MEDICINE

## 2018-02-07 PROCEDURE — 81001 URINALYSIS AUTO W/SCOPE: CPT

## 2018-02-07 PROCEDURE — 81025 URINE PREGNANCY TEST: CPT | Performed by: EMERGENCY MEDICINE

## 2018-02-07 PROCEDURE — 93005 ELECTROCARDIOGRAM TRACING: CPT

## 2018-02-07 PROCEDURE — 99284 EMERGENCY DEPT VISIT MOD MDM: CPT

## 2018-02-07 PROCEDURE — 36415 COLL VENOUS BLD VENIPUNCTURE: CPT | Performed by: EMERGENCY MEDICINE

## 2018-02-07 PROCEDURE — 81002 URINALYSIS NONAUTO W/O SCOPE: CPT | Performed by: EMERGENCY MEDICINE

## 2018-02-07 PROCEDURE — 96374 THER/PROPH/DIAG INJ IV PUSH: CPT

## 2018-02-07 PROCEDURE — 96375 TX/PRO/DX INJ NEW DRUG ADDON: CPT

## 2018-02-07 RX ORDER — KETOROLAC TROMETHAMINE 30 MG/ML
30 INJECTION, SOLUTION INTRAMUSCULAR; INTRAVENOUS ONCE
Status: COMPLETED | OUTPATIENT
Start: 2018-02-07 | End: 2018-02-07

## 2018-02-07 RX ORDER — ACETAMINOPHEN 325 MG/1
975 TABLET ORAL ONCE
Status: COMPLETED | OUTPATIENT
Start: 2018-02-07 | End: 2018-02-07

## 2018-02-07 RX ORDER — DIPHENHYDRAMINE HYDROCHLORIDE 50 MG/ML
25 INJECTION INTRAMUSCULAR; INTRAVENOUS ONCE
Status: COMPLETED | OUTPATIENT
Start: 2018-02-07 | End: 2018-02-07

## 2018-02-07 RX ORDER — METOCLOPRAMIDE HYDROCHLORIDE 5 MG/ML
10 INJECTION INTRAMUSCULAR; INTRAVENOUS ONCE
Status: COMPLETED | OUTPATIENT
Start: 2018-02-07 | End: 2018-02-07

## 2018-02-07 RX ADMIN — DIPHENHYDRAMINE HYDROCHLORIDE 25 MG: 50 INJECTION, SOLUTION INTRAMUSCULAR; INTRAVENOUS at 15:12

## 2018-02-07 RX ADMIN — SODIUM CHLORIDE 1000 ML: 0.9 INJECTION, SOLUTION INTRAVENOUS at 15:14

## 2018-02-07 RX ADMIN — METOCLOPRAMIDE 10 MG: 5 INJECTION, SOLUTION INTRAMUSCULAR; INTRAVENOUS at 15:16

## 2018-02-07 RX ADMIN — KETOROLAC TROMETHAMINE 30 MG: 30 INJECTION, SOLUTION INTRAMUSCULAR at 16:10

## 2018-02-07 RX ADMIN — ACETAMINOPHEN 975 MG: 325 TABLET, FILM COATED ORAL at 15:11

## 2018-02-07 NOTE — ED NOTES
Patient reports has had dizziness for the past 4 days  Reports the room is spinning, has blurred vision, photosensitivity and a headache       Amy Heaton RN  02/07/18 5018

## 2018-02-07 NOTE — DISCHARGE INSTRUCTIONS
Visión borrosa   LO QUE NECESITA SABER:   La visión borrosa es cuando usted no puede noy los detalles finos  Usted podría sufrir de visión borrosa si tiene miopía o hipermetropía y necesita usar lentes o gafas  La visión borrosa podría ser causada por boo abración de la córnea (rasguño en la córnea) o Verlin Aureliano en la córnea (herida abierta)  Usted podría tener visión borrosa si quinn marly ha entrado en contacto con algún químico  Un objeto extraño o boo infección también podrían causar visión borrosa  Las [employer] keaton la cataratas, glaucoma, desprendimiento de la retina, y trastornos en los nervios también podrían causar visión borrosa  La visión borrosa también podría ser [place] por boo contusión o un tumor  Si usted tiene diabetes, podría desarrollar retinopatía diabética  La retinopatía daña los vasos sanguíneos de quinn retina  INSTRUCCIONES SOBRE EL FRANCO HOSPITALARIA:   Regrese a la bonita de emergencias si:   · Usted tiene debilidad en un brazo o pierna, dificultad para hablar o noy y un brigette dolor de nicolás  · Usted tiene fiebre, dolor de marly o secreción en el marly  · Usted pierde la vista de forma repentina  Pregúntele a quinn Jerrica [place] vitaminas y minerales son adecuados para usted  · Quinn visión borrosa empeora    · Quinn visión borrosa es peor en la mañana  · Usted tiene un repentino dolor de nicolás o de marly  · Quinn marly está inflamado, enrojecido, o está drenando  · Usted está viendo manchas, anjel de jefferson, puntos finos, o formas de telaraña  · Usted tiene preguntas o inquietudes acerca de quinn condición o cuidado  Medicamentos:  Es posible que usted necesite alguno de los siguientes:  · Un medicamento con receta para el dolor  podrían ser Elza Mino  Pregunte cómo bruce estos medicamentos de boo forma ring  · Antibióticos  sirven para prevenir o tratar boo infección en el marly causada por bacteria  Se puede administrar en forma de gotas o un ungüento      · [employer] medicamentos keaton se le haya indicado  Consulte con quinn médico si usted parish que quinn medicamento no le está ayudando o si presenta efectos secundarios  Infórmele si es alérgico a algún medicamento  Mantenga boo lista actualizada de los [place], las vitaminas y los productos herbales que kelsi  Incluya los siguientes datos de los medicamentos: cantidad, frecuencia y motivo de administración  Traiga con usted la lista o los envases de la píldoras a hui citas de seguimiento  Lleve la lista de los medicamentos con usted en reagan de boo emergencia  Controlando quinn visión borrosa: Quinn médico le solicitará que caren cualquiera de los siguientes:  · Use lágrimas artificiales  para mantener el marly húmedo o para aliviar la irritación del marly  · Aplique boo compresa fría  para reducir cualquier inflamación y dolor  Use boo toalla o paño limpio y mójelo con [place] fría y colóqueselo en quinn marly  Use la compresa fría con la frecuencia indicada  · Use un parche para el marly según las indicaciones  para proteger a quinn marly  Acuda a hui consultas de control con quinn médico según le indicaron  Usted podría necesitar otros exámenes y medicamentos para el marly  Anote hui preguntas para que se acuerde de hacerlas quita hui visitas  © 2017 [address] [employer]  Information is for End User's use only and may not be sold, redistributed or otherwise used for commercial purposes  All illustrations and images included in CareNotes® are the copyrighted property of A D A M , Inc  or Dmitriy Swenson  Esta información es sólo para uso en educación  Quinn intención no es darle un consejo médico sobre enfermedades o tratamientos  Colsulte con quinn Elizabeth Mall farmacéutico antes de seguir cualquier régimen médico para saber si es seguro y efectivo para usted

## 2018-02-07 NOTE — ED NOTES
Pt given AMA form  All info regarding risks of leaving and benefits of procedure explained to pt  Pt continuing to want to leave at this time        Katelin Nassar RN  02/07/18 8088

## 2018-02-07 NOTE — ED NOTES
Pt reports, "I don't want the MRI today because it will take too long   I just want to go home and rest  Can you ask the doctor for something else for pain?" Dr Amber Segura aware of request       Gael Calix RN  02/07/18 2077

## 2018-02-07 NOTE — ED PROVIDER NOTES
History  Chief Complaint   Patient presents with    Dizziness     c/o headache, dizziness, blurred vision x 4 days  Reports known tumor on pituitary gland x years  History provided by:  Patient   used: No    Dizziness   Quality:  Lightheadedness  Severity:  Moderate  Onset quality:  Gradual  Duration:  5 days  Timing:  Intermittent  Progression:  Waxing and waning  Chronicity:  New  Context: standing up    Context: not with loss of consciousness    Relieved by:  Nothing  Worsened by:  Nothing  Ineffective treatments:  None tried  Associated symptoms: headaches and vision changes    Associated symptoms: no blood in stool, no chest pain, no diarrhea, no nausea, no shortness of breath, no vomiting and no weakness    Headaches:     Severity:  Moderate    Onset quality:  Gradual    Duration:  5 days    Timing:  Intermittent    Progression:  Waxing and waning    Chronicity:  Recurrent  Risk factors comment:  Migraines, Pituitary tumor      Prior to Admission Medications   Prescriptions Last Dose Informant Patient Reported? Taking? DULoxetine (CYMBALTA) 60 mg delayed release capsule   Yes Yes   Sig: Take 60 mg by mouth daily  QUEtiapine Fumarate (SEROQUEL PO)   Yes Yes   Sig: Take 800 mg by mouth daily at bedtime  SUMAtriptan (IMITREX) 100 mg tablet   No Yes   Sig: Take 1 tablet by mouth once as needed for migraine for up to 1 dose   acetaminophen (TYLENOL) 325 mg tablet   No No   Sig: Take 2 tablets by mouth every 6 (six) hours as needed for mild pain or fever   albuterol (PROVENTIL HFA,VENTOLIN HFA) 90 mcg/act inhaler   Yes Yes   Sig: Inhale 2 puffs every 4 (four) hours as needed     clonazePAM (KlonoPIN) 2 mg tablet   Yes Yes   Sig: Take 2 mg by mouth 2 (two) times a day as needed for seizures     estradiol (ESTRACE) 1 mg tablet   Yes Yes   Sig: Take 2 mg by mouth daily        Facility-Administered Medications: None       Past Medical History:   Diagnosis Date    Asthma     Migraine  Pituitary tumor     Psychiatric disorder     Depression       Past Surgical History:   Procedure Laterality Date    HYSTERECTOMY         History reviewed  No pertinent family history  I have reviewed and agree with the history as documented  Social History   Substance Use Topics    Smoking status: Never Smoker    Smokeless tobacco: Never Used    Alcohol use No        Review of Systems   Constitutional: Negative for activity change, chills and fever  HENT: Negative for facial swelling, sore throat and trouble swallowing  Eyes: Negative for pain and visual disturbance  Respiratory: Negative for cough, chest tightness and shortness of breath  Cardiovascular: Negative for chest pain and leg swelling  Gastrointestinal: Negative for abdominal pain, blood in stool, diarrhea, nausea and vomiting  Genitourinary: Negative for dysuria and flank pain  Musculoskeletal: Negative for back pain, neck pain and neck stiffness  Skin: Negative for pallor and rash  Allergic/Immunologic: Negative for environmental allergies and immunocompromised state  Neurological: Positive for dizziness and headaches  Negative for weakness  Hematological: Negative for adenopathy  Does not bruise/bleed easily  Psychiatric/Behavioral: Negative for agitation and behavioral problems  All other systems reviewed and are negative        Physical Exam  ED Triage Vitals   Temperature Pulse Respirations Blood Pressure SpO2   02/07/18 1317 02/07/18 1317 02/07/18 1317 02/07/18 1317 02/07/18 1317   97 5 °F (36 4 °C) 98 16 125/80 99 %      Temp Source Heart Rate Source Patient Position - Orthostatic VS BP Location FiO2 (%)   02/07/18 1317 02/07/18 1435 02/07/18 1435 02/07/18 1435 --   Temporal Monitor Lying Right arm       Pain Score       02/07/18 1317       Worst Possible Pain           Orthostatic Vital Signs  Vitals:    02/07/18 1317 02/07/18 1435   BP: 125/80 120/75   Pulse: 98 85   Patient Position - Orthostatic VS: Lying       Physical Exam   Constitutional: She is oriented to person, place, and time  She appears well-developed and well-nourished  No distress  HENT:   Head: Normocephalic and atraumatic  Eyes: EOM are normal    Neck: Normal range of motion  Neck supple  Cardiovascular: Normal rate, regular rhythm, normal heart sounds and intact distal pulses  Pulmonary/Chest: Effort normal and breath sounds normal  No respiratory distress  Abdominal: Soft  Bowel sounds are normal  There is no tenderness  There is no rebound and no guarding  Musculoskeletal: Normal range of motion  Neurological: She is alert and oriented to person, place, and time  No cranial nerve deficit  Coordination normal    Skin: Skin is warm and dry  Psychiatric: She has a normal mood and affect  Nursing note and vitals reviewed        ED Medications  Medications   sodium chloride 0 9 % bolus 1,000 mL (0 mL Intravenous Stopped 2/7/18 1618)   acetaminophen (TYLENOL) tablet 975 mg (975 mg Oral Given 2/7/18 1511)   diphenhydrAMINE (BENADRYL) injection 25 mg (25 mg Intravenous Given 2/7/18 1512)   metoclopramide (REGLAN) injection 10 mg (10 mg Intravenous Given 2/7/18 1516)   ketorolac (TORADOL) injection 30 mg (30 mg Intravenous Given 2/7/18 1610)       Diagnostic Studies  Results Reviewed     Procedure Component Value Units Date/Time    Basic metabolic panel [[id number]] Collected:  02/07/18 1516    Lab Status:  Final result Specimen:  Blood from Arm, Right Updated:  02/07/18 1530     Sodium 142 mmol/L      Potassium 3 9 mmol/L      Chloride 107 mmol/L      CO2 28 mmol/L      Anion Gap 7 mmol/L      BUN 11 mg/dL      Creatinine 0 93 mg/dL      Glucose 98 mg/dL      Calcium 8 6 mg/dL      eGFR 76 ml/min/1 73sq m     Narrative:         National Kidney Disease Education Program recommendations are as follows:  GFR calculation is accurate only with a steady state creatinine  Chronic Kidney disease less than 60 ml/min/1 73 sq  meters  Kidney failure less than 15 ml/min/1 73 sq  meters      CBC and differential [[id number]]  (Normal) Collected:  02/07/18 1516    Lab Status:  Final result Specimen:  Blood from Arm, Right Updated:  02/07/18 1522     WBC 6 38 Thousand/uL      RBC 4 12 Million/uL      Hemoglobin 12 8 g/dL      Hematocrit 38 8 %      MCV 94 fL      MCH 31 1 pg      MCHC 33 0 g/dL      RDW 14 3 %      MPV 10 7 fL      Platelets 129 Thousands/uL      nRBC 0 /100 WBCs      Neutrophils Relative 57 %      Lymphocytes Relative 33 %      Monocytes Relative 9 %      Eosinophils Relative 1 %      Basophils Relative 0 %      Neutrophils Absolute 3 60 Thousands/µL      Lymphocytes Absolute 2 11 Thousands/µL      Monocytes Absolute 0 57 Thousand/µL      Eosinophils Absolute 0 09 Thousand/µL      Basophils Absolute 0 01 Thousands/µL     Urine Microscopic [[id number]]  (Abnormal) Collected:  02/07/18 1429    Lab Status:  Final result Specimen:  Urine from Urine, Clean Catch Updated:  02/07/18 1449     RBC, UA None Seen /hpf      WBC, UA 2-4 (A) /hpf      Epithelial Cells Occasional /hpf      Bacteria, UA Occasional /hpf     POCT urinalysis dipstick [[id number]]  (Abnormal) Resulted:  02/07/18 1433    Lab Status:  Final result Specimen:  Urine Updated:  02/07/18 1433    POCT pregnancy, urine [[id number]]  (Normal) Resulted:  02/07/18 1433    Lab Status:  Final result Updated:  02/07/18 1433     EXT PREG TEST UR (Ref: Negative) negative    ED Urine Macroscopic [[id number]]  (Abnormal) Collected:  02/07/18 1429    Lab Status:  Final result Specimen:  Urine Updated:  02/07/18 1429     Color, UA Yellow     Clarity, UA Clear     pH, UA 6 5     Leukocytes, UA Small (A)     Nitrite, UA Negative     Protein, UA Negative mg/dl      Glucose, UA Negative mg/dl      Ketones, UA Negative mg/dl      Urobilinogen, UA 0 2 E U /dl      Bilirubin, UA Negative     Blood, UA Negative     Specific Gravity, UA 1 025    Narrative:       CLINITEK RESULT                 CT head without contrast   Final Result by Silvia Henley MD (02/07 4103)      No acute intracranial abnormality  Unchanged 6 mm pituitary nodule  Workstation performed: [id number]                    Procedures  ECG 12 Lead Documentation  Date/Time: 2/7/2018 2:38 PM  Performed by: Irasema Ortega  Authorized by: Irasema Ortega     Indications / Diagnosis:  Dyspnea  ECG reviewed by me, the ED Provider: yes    Patient location:  ED  Interpretation:     Interpretation: normal    Rate:     ECG rate:  116    ECG rate assessment: tachycardic    Ectopy:     Ectopy: none    QRS:     QRS axis:  Normal  Conduction:     Conduction: normal    ST segments:     ST segments:  Normal  T waves:     T waves: normal    Comments:      Undetermined rythm           Phone Contacts  ED Phone Contact    ED Course  ED Course as of Feb 07 2035   Wed Feb 07, 2018   1554  Labs within normal limits, CT head shows stable Pituitary lesion  Case discussed with neurologist, Dr Lorrie Arevalo,  since patient has new neuro symptoms  including subjective speech and vision complaints, blurred vision, not reported with prior migraines; advised to get MRI, if normal, can be discharged with outpatient follow up with her Neurologist  Discussed with Dr Elkin Quiroz, Radiologist, okayed the test     [id number] Patient now refused to get MRI, signed AMA, understands risk of stroke, disability and death  MDM  Number of Diagnoses or Management Options  Blurred vision: new and requires workup  Migraine: new and requires workup  Diagnosis management comments: Patient is a 49-year-old female, comes in with complaints of dizziness, headaches, blurred vision, speech problems described as difficulty findings words; going on for about 5 days, intermittent; denies double vision, fever, neck pain, chest pain or dyspnea  PMH: Bipolar disorder,Pituitary tumor  VSS, physical exam wnl, no CN or focal deficits, normal bedside visual acuity, normal visual fields, PERRL     D/D: Pitiutary lesion enlargement, ICH, Complex migraine  Although normal exam, we will check labs, CT head and touch base with Neurologist  Treat MIgraine with IV and oral meds un ER  Amount and/or Complexity of Data Reviewed  Clinical lab tests: reviewed and ordered  Tests in the radiology section of CPT®: ordered and reviewed  Tests in the medicine section of CPT®: ordered and reviewed  Discuss the patient with other providers: yes  Independent visualization of images, tracings, or specimens: yes      CritCare Time    Disposition  Final diagnoses:   Migraine   Blurred vision     Time reflects when diagnosis was documented in both MDM as applicable and the Disposition within this note     Time User Action Codes Description Comment    2/7/2018  4:00 PM Kathye Waterbury Add [N44 [id number]] Migraine     2/7/2018  4:00 PM Kathye Waterbury Add [H53 8] Blurred vision     2/7/2018  4:01 PM Kathye Waterbury Add [F80 9,  R47 9] Speech and language deficits     2/7/2018  4:01 PM Elise Alford Physiq [F80 9,  R47 9] Speech and language deficits       ED Disposition     ED Disposition Condition Comment    AMA  Date: 2/7/2018  Patient: Shahrzad Guzman  Admitted: 2/7/2018  1:18 PM  Attending Provider: Renato Bautista MD    Shahrzad Guzman or her authorized caregiver has made the decision for the patient to leave the emergency department against the advice of her a ttending physician  She or her authorized caregiver has been informed and understands the inherent risks, including stroke, disability, death  She or her authorized caregiver has decided to accept the responsibility for this decision  Shahrzad Guzman an d all necessary parties have been advised that she may return for further evaluation or treatment  Her condition at time of discharge was stable    Shahrzad Guzman had current vital signs as follows:  /75 (BP Location: Right arm)   Pulse 85   Temp  97 9 °F (36 6 °C) (Oral)   Resp 18   Wt 86 6 kg (191 lb)         Follow-up Information     Follow up With Specialties Details [facility]   [address]  [address] [place] [address]          Please follow up with your Neurologist         Discharge Medication List as of 2/7/2018  4:20 PM      CONTINUE these medications which have NOT CHANGED    Details   albuterol (PROVENTIL HFA,VENTOLIN HFA) 90 mcg/act inhaler Inhale 2 puffs every 4 (four) hours as needed  , Starting Tue 1/3/2017, Historical Med      clonazePAM (KlonoPIN) 2 mg tablet Take 2 mg by mouth 2 (two) times a day as needed for seizures  , Until Discontinued, Historical Med      DULoxetine (CYMBALTA) 60 mg delayed release capsule Take 60 mg by mouth daily  , Until Discontinued, Historical Med      estradiol (ESTRACE) 1 mg tablet Take 2 mg by mouth daily  , Starting Wed 4/15/2015, Historical Med      QUEtiapine Fumarate (SEROQUEL PO) Take 800 mg by mouth daily at bedtime  , Until Discontinued, Historical Med      SUMAtriptan (IMITREX) 100 mg tablet Take 1 tablet by mouth once as needed for migraine for up to 1 dose, Starting Wed 8/16/2017, Print      acetaminophen (TYLENOL) 325 mg tablet Take 2 tablets by mouth every 6 (six) hours as needed for mild pain or fever, Starting Wed 9/20/2017, Print           No discharge procedures on file      ED Provider  Electronically Signed by           Lizabeth Will MD  02/07/18 9100

## 2018-07-05 ENCOUNTER — HOSPITAL ENCOUNTER (EMERGENCY)
Facility: HOSPITAL | Age: 43
Discharge: HOME | End: 2018-07-05
Attending: EMERGENCY MEDICINE
Payer: COMMERCIAL

## 2018-07-05 ENCOUNTER — APPOINTMENT (EMERGENCY)
Dept: RADIOLOGY | Facility: HOSPITAL | Age: 43
End: 2018-07-05
Payer: COMMERCIAL

## 2018-07-05 ENCOUNTER — TELEPHONE (OUTPATIENT)
Dept: GASTROENTEROLOGY | Facility: CLINIC | Age: 43
End: 2018-07-05

## 2018-07-05 VITALS
SYSTOLIC BLOOD PRESSURE: 119 MMHG | OXYGEN SATURATION: 97 % | HEART RATE: 90 BPM | RESPIRATION RATE: 15 BRPM | TEMPERATURE: 98 F | DIASTOLIC BLOOD PRESSURE: 63 MMHG | WEIGHT: 196 LBS

## 2018-07-05 DIAGNOSIS — R09.89 FOREIGN BODY SENSATION IN THROAT: Primary | ICD-10-CM

## 2018-07-05 PROCEDURE — 99283 EMERGENCY DEPT VISIT LOW MDM: CPT

## 2018-07-05 PROCEDURE — 70360 X-RAY EXAM OF NECK: CPT

## 2018-07-05 RX ORDER — ZOLPIDEM TARTRATE 10 MG/1
10 TABLET ORAL
COMMUNITY
Start: 2018-04-06 | End: 2022-06-06

## 2018-07-05 RX ADMIN — LIDOCAINE HYDROCHLORIDE 15 ML: 20 SOLUTION ORAL; TOPICAL at 11:01

## 2018-07-05 NOTE — TELEPHONE ENCOUNTER
Pt will be new to the practice she will need an appt for a lump in her throat that is very painful and difficulty swallowing  She was referred to us by the ED  Please return her call to schedule, she would like to go to the [place] office   [phone]

## 2018-07-05 NOTE — DISCHARGE INSTRUCTIONS
Faringitis estreptocócica   LO QUE NECESITA SABER:   La faringitis estreptocócica es boo infección en la garganta causada por boo bacteria  Se contagia fácilmente de persona a persona  [facility]:   Llame al 911 en reagan de presentar lo siguiente:   · Usted tiene dificultad para respirar  Regrese a la bonita de emergencias si:   · Usted presenta nuevos síntomas keaton un dolor de nicolás severo, rigidez en el tobin, dolor de pecho o vómito  · Usted está babeando a consecuencia de no poder tragarse quinn saliva  Pregúntele a quinn Kishan Gambler vitaminas y minerales son adecuados para usted  · Usted tiene fiebre  · Usted tiene salpullido o dolor de oído  · Usted al toser o sonarse la nariz presenta boo mucosidad o flema mildred, amarilla-café o sanguinolenta  · Usted es incapaz de gonzales líquidos  · Usted tiene preguntas o inquietudes acerca de quinn condición o cuidado  Medicamentos:   · Antibióticos  ayudarán a tratar quinn faringitis por estreptococo  José Miguel Pore se sentirá mejor entre 2 y 1 días después de empezar a gonzales los antibióticos  · [place] hui medicamentos keaton se le haya indicado  Consulte con quinn médico si usted parish que quinn medicamento no le está ayudando o si presenta efectos secundarios  Infórmele si es alérgico a cualquier medicamento  Mantenga boo lista actualizada de los [place], las vitaminas y los productos herbales que kelsi  Incluya los siguientes datos de los medicamentos: cantidad, frecuencia y motivo de administración  Traiga con usted la lista o los envases de la píldoras a hui citas de seguimiento  Lleve la lista de los medicamentos con usted en reagan de boo emergencia  El [facility] síntomas:   · Use pastillas para la garganta, hielo, alimentos suaves o helados de agua  para aliviar quinn garganta  · Cayce jugo, batidos con [place] o sopa  si quinn garganta está demasiado irritada y el dolor no le permite comer alimentos sólidos   Gonzales líquidos también puede ayudar a prevenir la deshidratación  · Rigo gárgaras de agua con sal   Mezcle ¼ de cucharadita de sal en un vaso de agua tibia y rigo gárgaras  [place] podría ayudarle a reducir la inflamación en la garganta  · No fume  La nicotina y otros químicos contenidos en los cigarrillos y puros pueden causar daño pulmonar y empeorar hui síntomas  Pida información a camara médico si usted actualmente fuma y necesita ayuda para dejar de fumar  Los cigarrillos electrónicos o tabaco sin humo todavía contienen nicotina  Consulte con camara médico antes de [employer]  Regrese al Wendelyn Lipoma o a la escuela  después de 24 horas de umu [employer] de antibióticos  Prevención de la propagación de la faringitis por estreptococo:   · [employer] las scotty frecuentemente  Utilice agua y [place]  [employer] las scotty después de usar el baño, cambiarle el pañal a un brandi o estornudar  Lávese las scotty antes de comer o preparar alimentos  · No comparta alimentos o bebidas  Reemplace camara cepillo de dientes 24 horas después de umu tomado antibióticos  Acuda a hui consultas de control con camara médico según le indicaron  Anote hui preguntas para que se acuerde de hacerlas quita hui visitas  © 2017 [employer]  Information is for End User's use only and may not be sold, redistributed or otherwise used for commercial purposes  All illustrations and images included in CareNotes® are the copyrighted property of A D A M , Inc  or Dmitriy Swenson  Esta información es sólo para uso en educación  Camara intención no es darle un consejo médico sobre enfermedades o tratamientos  Colsulte con camara [place] [employer] farmacéutico antes de seguir cualquier régimen médico para saber si es seguro y efectivo para usted  Cuerpo extraño esofágico   LO QUE NECESITA SABER:   Un cuerpo extraño esofágico es un objeto que usted se tragó y se quedó atorado en camara esófago (garganta)   Sharri Cruz son trabajos dentales y pilas de botón  Un pedazo de comida o boo timoteo de pescado también pueden quedarse atorados en camara esófago  [facility]IONES [facility]:   Cmame al 911 si presenta:   · Usted tiene dolor en el pecho o en el abdomen, o falta de aliento  · Usted se está asfixiando  Regrese a la bonita de emergencias si:   · Usted tiene fiebre  · Usted tiene más dolor cuando traga  · Usted tiene muchos vómitos  · Camara vómito tiene [place]  · Hui heces son negras o tienen lolis  Pregúntele a camara Omari Suzanne vitaminas y minerales son adecuados para usted  · Usted no encuentra el objeto en hui evacuaciones intestinales dentro de 2 a 3 días  · Usted tiene preguntas o inquietudes acerca de camara condición o cuidado  Busque el objeto en hui evacuaciones:  Busque por la pieza dental, batería u otro objeto pequeño y liso cada vez que pasa hui heces  No use laxantes o suavizantes de heces  No se provoque el vómito  Si usted se tragó otro objeto:   · No se meta el dedo en la garganta para tratar de remover el objeto  [place] podría empujar el objeto aún más adentro  · Tosa   Es posible que pueda expulsar el objeto por medio de la tos  Acuda a hui consultas de control con camara médico según le indicaron  Es probable que usted necesite regresar a que le realicen radiografías u otros exámenes  Anote hui preguntas para que se acuerde de hacerlas quita hui visitas  © 2017 [employer] Romero Information is for End User's use only and may not be sold, redistributed or otherwise used for commercial purposes  All illustrations and images included in CareNotes® are the copyrighted property of A D A M , Inc  or Dmitriy Swenson  Esta información es sólo para uso en educación  Camara intención no es darle un consejo médico sobre enfermedades o tratamientos   Colsulte con camara Katt Morris farmacéutico antes de seguir cualquier régimen médico para saber si es seguro y [place] para usted

## 2018-07-05 NOTE — ED PROVIDER NOTES
History  Chief Complaint   Patient presents with    Sore Throat     Throat hurts to swallow since last night  Not taking anything for pain     This is a 51-year-old female patient who started last night with she describes as pain during swallowing however she describes more of foreign bodies she points over her thyroid  Last night she was eating salmon but states there are no bones  She has no difficulty swallowing liquids or solids however it is just uncomfortable  Nothing makes it better and swallowing makes it worse  No difficulty breathing no fever no chills no cough congestion  No stiff neck  She has never had before  No nausea vomiting diarrhea abdominal pain no chest pain or shortness of breath  No urinary symptoms  Differential diagnosis includes not limited to sore throat due to inflammation, abscess very unlikely, foreign body less likely, hysterical bolus            Prior to Admission Medications   Prescriptions Last Dose Informant Patient Reported? Taking? QUEtiapine Fumarate (SEROQUEL PO)   Yes Yes   Sig: Take 800 mg by mouth daily at bedtime  albuterol (PROVENTIL HFA,VENTOLIN HFA) 90 mcg/act inhaler   Yes Yes   Sig: Inhale 2 puffs every 4 (four) hours as needed     clonazePAM (KlonoPIN) 2 mg tablet   Yes Yes   Sig: Take 2 mg by mouth 2 (two) times a day as needed for seizures  zolpidem (AMBIEN) 10 mg tablet   Yes Yes   Sig: Take 10 mg by mouth daily at bedtime      Facility-Administered Medications: None       Past Medical History:   Diagnosis Date    Asthma     Migraine     Pituitary tumor     Psychiatric disorder     Depression       Past Surgical History:   Procedure Laterality Date    HYSTERECTOMY         History reviewed  No pertinent family history  I have reviewed and agree with the history as documented      Social History   Substance Use Topics    Smoking status: Never Smoker    Smokeless tobacco: Never Used    Alcohol use No        Review of Systems   All other systems reviewed and are negative  Physical Exam  Physical Exam   Constitutional: She appears well-developed and well-nourished  HENT:   Head: Normocephalic and atraumatic  Right Ear: External ear normal    Left Ear: External ear normal    Nose: Nose normal    Mouth/Throat: Oropharynx is clear and moist    Eyes: Conjunctivae are normal  Pupils are equal, round, and reactive to light  Neck: Normal range of motion  Neck supple  Cardiovascular: Normal rate and regular rhythm  Pulmonary/Chest: Effort normal and breath sounds normal    Abdominal: Soft  Bowel sounds are normal  There is no tenderness  Neurological: She is alert  Skin: Skin is warm  Psychiatric: She has a normal mood and affect  Her behavior is normal    Nursing note and vitals reviewed  Vital Signs  ED Triage Vitals [07/05/18 1035]   Temperature Pulse Respirations Blood Pressure SpO2   98 °F (36 7 °C) 90 15 119/63 97 %      Temp Source Heart Rate Source Patient Position - Orthostatic VS BP Location FiO2 (%)   Temporal -- -- -- --      Pain Score       5           Vitals:    07/05/18 1035   BP: 119/63   Pulse: 90       Visual Acuity      ED Medications  Medications   lidocaine viscous (XYLOCAINE) 2 % mucosal solution 15 mL (15 mL Swish & Spit Given 7/5/18 1101)       Diagnostic Studies  Results Reviewed     None                 XR neck soft tissue   Final Result by Mikayla Cintron MD (07/05 1128)      Unremarkable study  If there is continued clinical concern for foreign body, direct visualization recommended  Workstation performed: [id number]                    Procedures  Procedures       Phone Contacts  ED Phone Contact    ED Course                               MDM  Number of Diagnoses or Management Options  Diagnosis management comments: This is a 63-year-old female patient presented 1 day history of foreign body sensation/discomfort in her throat behind the thyroid    She is able to eat and drink no difficulty breathing  Only bothers her when she swallows  A soft tissue x-ray of the neck was taken showed no foreign body she is not recall swallowing a bone  This was lidocaine did not help  Her voice is normal she has no sign of infection or abscess  I discussed the risks and benefits regarding CT scan of the neck and at this point is a ladder radiation for someone was sensation and really has no airway or swallowing compromise  At this time she will be discharged some Cepacol lozenges and follow up with Gastroenterology for scope  CritCare Time    Disposition  Final diagnoses:   Foreign body sensation in throat     Time reflects when diagnosis was documented in both MDM as applicable and the Disposition within this note     Time User Action Codes Description Comment    7/5/2018 11:41 AM Daphne Law Add [L38 516] Foreign body sensation in right ear canal     7/5/2018 11:41 AM Robbin Thompson Remove [O47 060] Foreign body sensation in right ear canal     7/5/2018 11:41 AM Robbin Thompson Add [R09 89] Foreign body sensation in throat       ED Disposition     ED Disposition Condition Comment    Discharge  Carlin Isidro discharge to home/self care  Condition at discharge: Good        Follow-up Information     Follow up With Specialties Details Why [address] [facility] [place] [facility] Schedule an appointment as soon as possible for a visit  [facility] [id number] [phone]          Patient's Medications   Discharge Prescriptions    MENTHOL-CETYLPYRIDINIUM (CEPACOL) 3 MG LOZENGE    Take 1 lozenge (3 mg total) by mouth as needed for sore throat       Start Date: 7/5/2018  End Date: --       Order Dose: 3 mg       Quantity: 25 tablet    Refills: 0     No discharge procedures on file      ED Provider  Electronically Signed by           Ita Encarnacion PA-C  07/05/18 5415

## 2018-09-22 ENCOUNTER — HOSPITAL ENCOUNTER (EMERGENCY)
Facility: HOSPITAL | Age: 43
Discharge: HOME | End: 2018-09-22
Attending: EMERGENCY MEDICINE | Admitting: EMERGENCY MEDICINE
Payer: COMMERCIAL

## 2018-09-22 VITALS
SYSTOLIC BLOOD PRESSURE: 113 MMHG | OXYGEN SATURATION: 99 % | TEMPERATURE: 97.8 F | HEART RATE: 79 BPM | DIASTOLIC BLOOD PRESSURE: 75 MMHG | RESPIRATION RATE: 16 BRPM

## 2018-09-22 DIAGNOSIS — G43.909 MIGRAINE HEADACHE: Primary | ICD-10-CM

## 2018-09-22 LAB
BACTERIA UR QL AUTO: ABNORMAL /HPF
BILIRUB UR QL STRIP: NEGATIVE
CLARITY UR: CLEAR
CLARITY, POC: CLEAR
COLOR UR: YELLOW
COLOR, POC: YELLOW
EXT PREG TEST URINE: NEGATIVE
GLUCOSE UR STRIP-MCNC: NEGATIVE MG/DL
HGB UR QL STRIP.AUTO: NEGATIVE
KETONES UR STRIP-MCNC: NEGATIVE MG/DL
LEUKOCYTE ESTERASE UR QL STRIP: ABNORMAL
NITRITE UR QL STRIP: NEGATIVE
NON-SQ EPI CELLS URNS QL MICRO: ABNORMAL /HPF
PH UR STRIP.AUTO: 7 [PH] (ref 4.5–8)
PROT UR STRIP-MCNC: NEGATIVE MG/DL
RBC #/AREA URNS AUTO: ABNORMAL /HPF
SP GR UR STRIP.AUTO: 1.01 (ref 1–1.03)
UROBILINOGEN UR QL STRIP.AUTO: 0.2 E.U./DL
WBC #/AREA URNS AUTO: ABNORMAL /HPF

## 2018-09-22 PROCEDURE — 99283 EMERGENCY DEPT VISIT LOW MDM: CPT

## 2018-09-22 PROCEDURE — 81025 URINE PREGNANCY TEST: CPT | Performed by: EMERGENCY MEDICINE

## 2018-09-22 PROCEDURE — 96375 TX/PRO/DX INJ NEW DRUG ADDON: CPT

## 2018-09-22 PROCEDURE — 81001 URINALYSIS AUTO W/SCOPE: CPT

## 2018-09-22 PROCEDURE — 96365 THER/PROPH/DIAG IV INF INIT: CPT

## 2018-09-22 RX ORDER — OMEPRAZOLE 20 MG/1
20 CAPSULE, DELAYED RELEASE ORAL DAILY
COMMUNITY
End: 2019-08-05

## 2018-09-22 RX ORDER — ESTRADIOL 1 MG/1
2 TABLET ORAL DAILY
COMMUNITY
Start: 2015-04-15

## 2018-09-22 RX ORDER — KETOROLAC TROMETHAMINE 30 MG/ML
15 INJECTION, SOLUTION INTRAMUSCULAR; INTRAVENOUS ONCE
Status: COMPLETED | OUTPATIENT
Start: 2018-09-22 | End: 2018-09-22

## 2018-09-22 RX ORDER — MAGNESIUM SULFATE HEPTAHYDRATE 40 MG/ML
2 INJECTION, SOLUTION INTRAVENOUS ONCE
Status: COMPLETED | OUTPATIENT
Start: 2018-09-22 | End: 2018-09-22

## 2018-09-22 RX ORDER — METOCLOPRAMIDE HYDROCHLORIDE 5 MG/ML
10 INJECTION INTRAMUSCULAR; INTRAVENOUS ONCE
Status: COMPLETED | OUTPATIENT
Start: 2018-09-22 | End: 2018-09-22

## 2018-09-22 RX ORDER — METOCLOPRAMIDE 10 MG/1
10 TABLET ORAL EVERY 6 HOURS PRN
Qty: 12 TABLET | Refills: 0 | Status: SHIPPED | OUTPATIENT
Start: 2018-09-22 | End: 2018-09-25

## 2018-09-22 RX ORDER — PREDNISONE 20 MG/1
40 TABLET ORAL DAILY
Qty: 8 TABLET | Refills: 0 | Status: SHIPPED | OUTPATIENT
Start: 2018-09-22 | End: 2018-09-26

## 2018-09-22 RX ORDER — METHYLPREDNISOLONE SODIUM SUCCINATE 125 MG/2ML
125 INJECTION, POWDER, LYOPHILIZED, FOR SOLUTION INTRAMUSCULAR; INTRAVENOUS ONCE
Status: COMPLETED | OUTPATIENT
Start: 2018-09-22 | End: 2018-09-22

## 2018-09-22 RX ORDER — DIPHENHYDRAMINE HYDROCHLORIDE 50 MG/ML
50 INJECTION INTRAMUSCULAR; INTRAVENOUS ONCE
Status: COMPLETED | OUTPATIENT
Start: 2018-09-22 | End: 2018-09-22

## 2018-09-22 RX ORDER — PREDNISONE 20 MG/1
40 TABLET ORAL DAILY
Qty: 15 TABLET | Refills: 0 | Status: SHIPPED | OUTPATIENT
Start: 2018-09-22 | End: 2018-09-22

## 2018-09-22 RX ADMIN — MAGNESIUM SULFATE HEPTAHYDRATE 2 G: 40 INJECTION, SOLUTION INTRAVENOUS at 13:59

## 2018-09-22 RX ADMIN — METHYLPREDNISOLONE SODIUM SUCCINATE 125 MG: 125 INJECTION, POWDER, FOR SOLUTION INTRAMUSCULAR; INTRAVENOUS at 13:58

## 2018-09-22 RX ADMIN — DIPHENHYDRAMINE HYDROCHLORIDE 50 MG: 50 INJECTION INTRAMUSCULAR; INTRAVENOUS at 12:26

## 2018-09-22 RX ADMIN — METOCLOPRAMIDE 10 MG: 5 INJECTION, SOLUTION INTRAMUSCULAR; INTRAVENOUS at 12:26

## 2018-09-22 RX ADMIN — KETOROLAC TROMETHAMINE 15 MG: 30 INJECTION, SOLUTION INTRAMUSCULAR at 12:26

## 2018-09-22 NOTE — DISCHARGE INSTRUCTIONS
Migraña   LO QUE NECESITA SABER:   Hailee Smoker es un dolor de nicolás intenso  El dolor puede ser tan severo que interfiere con antonieta actividades cotidianas  Hailee Smoker puede durar desde pocas horas hasta varios días  La causa exacta de la migraña no es conocida  INSTRUCCIONES SOBRE [facility]ITAL[facility]:   Regrese a la bonita de emergencias si:   · Usted tiene dolor de nicolás que parece ser diferente o mucho peor que quinn migraña habitual     · Usted tiene un dolor de nicolás severo con fiebre o rigidez en el tobin  · Usted tiene nuevos problemas con el habla, la visión, el equilibrio o el [address]  · Usted siente que se va a desmayar, se siente confundido o sufre boo convulsión  Comuníquese con quinn médico o neurólogo si:   · Quinn migraña interfiere con antonieta actividades cotidianas  · Antonieta medicamentos o tratamientos asher de funcionar  · Usted tiene preguntas o inquietudes acerca de quinn condición o cuidado  Medicamentos:  Usted podría  necesitar alguno de los siguientes  [place] medicamento tan pronto keaton sienta que le comienza Hailee Smoker  · Un medicamento con receta para el dolor  podrían ser Angelica Lacer  No espere a que el dolor sea muy intenso para bruce el medicamento  · Medicamentos para la migraña  se [place] para evitar boo migraña o detenerla boo vez que comience  · Los medicamentos contra las náuseas  pueden darse para calmar quinn estómago y ayudarle a prevenir los vómitos  [place] medicamento también puede aliviar el dolor  · [place] antonieta medicamentos keaton se le haya indicado  Consulte con quinn médico si usted parish que quinn medicamento no le está ayudando o si presenta efectos secundarios  Infórmele si es alérgico a cualquier medicamento  Mantenga boo lista actualizada de los [place], las vitaminas y los productos herbales que kelsi  Incluya los siguientes datos de los medicamentos: cantidad, frecuencia y motivo de administración   Traiga con usted la lista o los envases de la píldoras a antonieta citas de seguimiento  Lleve la lista de los medicamentos con usted en reagan de boo emergencia  El [place] de quinn síntomas:   · Repose en boo habitación oscura y Arnie  [place] ayudará a disminuir el dolor  El dormir también podría ayudarlo a aliviar quinn dolor  · Aplique hielo para reducir el dolor  Use un paquete de hielo o ponga hielo molido dentro de [employer]  Cubra el paquete de hielo con boo toalla y colóqueselo en la nicolás  Aplique hielo quita 15 a 20 minutos cada hora  · Aplique calor para disminuir el dolor y los espasmos musculares  Utilice boo toalla pequeña empapada con [place], boo almohada térmica o tome un baño de manav con agua tibia  Aplique la compresa caliente sobre el área por 20 a 30 minutos cada 2 horas  Usted puede alternar el calor y el hielo  · Mantenga un registro de las migrañas  Escriba cuándo comienzan y terminan hui migrañas  Rogena Lang y [place] haciendo cuando comenzó [place]  Registre lo que comió y lo que tomó las 24 horas antes de que comenzó quinn migraña  Mantenga un registro de lo que hizo para tratar quinn migraña y si funcionó  Traiga el registro de las migrañas con usted a las citas con quinn médico   Programe boo troy con quinn médico o quinn neurólogo keaton se le indique:  Lleve quinn registro de migrañas con usted  Anote hui preguntas para que se acuerde de hacerlas quita hui visitas  Evite otra migraña:   · No fume  La nicotina y otras sustancias químicas en los cigarrillos y puros pueden desencadenar boo [place]  Pida información a quinn médico si usted actualmente fuma y necesita ayuda para dejar de fumar  Los cigarrillos electrónicos o tabaco sin humo todavía contienen nicotina  Consulte con quinn médico antes de [employer]  · No consuma alcohol  El alcohol puede provocar migraña  También puede impedir que [employer] medicamentos para la migraña  · Ejercítese regularmente    El ejercicio puede ayudar a evitar migrañas  Consulte con camara médico acerca de cuál es el mejor régimen de ejercicio para usted  Trate de hacer unos 30 minutos de ejercicio todos los días que pueda  · Controle el estrés  El estrés podría provocar migraña  Aprenda nuevas maneras de relajarse keaton la respiración profunda  · Establezca un horario para dormir  Acuéstese y levántese a la misma hora cada día  No anh televisión inmediatamente antes de acostarse  · Coma hui comidas regularmente  Incluya alimentos [employer] fruta, verduras, panes de grano entero, productos lácteos bajos en grasa, frijoles, carne magra y pescado  No consuma alimentos o bebidas que puedan desencadenar hui migrañas  © 2017 [address] Information is for End User's use only and may not be sold, redistributed or otherwise used for commercial purposes  All illustrations and images included in CareNotes® are the copyrighted property of A D A M , Inc  or Dmitriy Swenson  Esta información es sólo para uso en educación  Camara intención no es darle un consejo médico sobre enfermedades o tratamientos  Colsulte con camara Marcela Nunn farmacéutico antes de seguir cualquier régimen médico para saber si es seguro y efectivo para usted

## 2018-09-22 NOTE — ED ATTENDING ATTESTATION
Bisi Lopez MD, saw and evaluated the patient  I have discussed the patient with the resident/non-physician practitioner and agree with the resident's/non-physician practitioner's findings, Plan of Care, and MDM as documented in the resident's/non-physician practitioner's note, except where noted  All available labs and Radiology studies were reviewed  At this point I agree with the current assessment done in the Emergency Department  I have conducted an independent evaluation of this patient a history and physical is as follows:    80-year-old female past fell history significant for migraine pituitary adenoma presents for evaluation of headache since yesterday  Patient complains of gradual onset of a right frontal headache, which is constant, nonradiating, getting progressively worse  Patient states this is her typical migraine headache  She denies focal neuro deficits or weakness, nausea vomiting fevers, chills, neck pain or neck stiffness, cough, URI symptoms recent fall/head trauma  Ten systems reviewed otherwise negative  On exam acute distress, lungs normal cardiac normal, abdomen normal, neck normal, normal neuro exam, HEENT normal  Medical decision making; headache consistent with prior migraine headaches with absence of neuro complaints benign exam, absence of red flag signs or symptoms-will reassure, counseled, treat headache, reassess for disposition      Critical Care Time  CritCare Time    Procedures

## 2018-09-22 NOTE — ED NOTES
Provider at bedside, aware no improvement in patient's pain level since medicated        Daniel Capps RN  09/22/18 0143

## 2018-09-22 NOTE — ED PROVIDER NOTES
History  Chief Complaint   Patient presents with    Headache - Recurrent or Known Dx Migraines     headache since yesterday, vomited once yesterday  51-year-old female with history of migraines and pituitary adenoma presents to the emergency department complaining of headache since yesterday  Says it was gradual in onset, has been gradually worsening  Pain is the right frontal area and behind the right eye  Typically her migraines are on the superior portion of her head, but she says she has had migraines like this before  She has been taking Naprosyn and Tylenol without relief  She also takes Klonopin, Ambien, and Seroquel at night to help her sleep, but she was unable to sleep much due to the pain  She did vomit once yesterday  Complains of photosensitivity  Denies any numbness, tingling, weakness  Denies dizziness or lightheadedness  Denies shortness of breath or chest pain  She says she follows with a neurologist with [facility] once a year  Last year, was prescribed what appears to be Maxalt based on chart review, but she says she was given only enough for one month  No other preventive/abortive medications prescribed other than naproxen and Tylenol  She was here in February for a headache but with visual changes in speech difficulty  A noncontrast head CT showed a known, stable 6mm pituitary adenoma  MRI was recommended per neurology but pt eventually left AMA  Prior to Admission Medications   Prescriptions Last Dose Informant Patient Reported? Taking? QUEtiapine Fumarate (SEROQUEL PO)   Yes Yes   Sig: Take 800 mg by mouth daily at bedtime  albuterol (PROVENTIL HFA,VENTOLIN HFA) 90 mcg/act inhaler   Yes Yes   Sig: Inhale 2 puffs every 4 (four) hours as needed     clonazePAM (KlonoPIN) 2 mg tablet   Yes Yes   Sig: Take 2 mg by mouth 2 (two) times a day as needed for seizures     estradiol (ESTRACE) 1 mg tablet   Yes Yes   Sig: Take 2 mg by mouth   omeprazole (PriLOSEC) 20 mg delayed release capsule   Yes Yes   Sig: Take 20 mg by mouth daily   zolpidem (AMBIEN) 10 mg tablet   Yes Yes   Sig: Take 10 mg by mouth daily at bedtime      Facility-Administered Medications: None       Past Medical History:   Diagnosis Date    Asthma     Migraine     Pituitary tumor     Psychiatric disorder     Depression       Past Surgical History:   Procedure Laterality Date    HYSTERECTOMY         History reviewed  No pertinent family history  I have reviewed and agree with the history as documented  Social History   Substance Use Topics    Smoking status: Never Smoker    Smokeless tobacco: Never Used    Alcohol use No        Review of Systems   Constitutional: Negative for activity change, chills, fatigue and fever  HENT: Negative for congestion, ear pain, facial swelling, nosebleeds, postnasal drip, rhinorrhea, sinus pain, sinus pressure, sore throat and trouble swallowing  Eyes: Positive for photophobia  Negative for visual disturbance  Respiratory: Negative for cough, chest tightness and shortness of breath  Cardiovascular: Negative for chest pain, palpitations and leg swelling  Gastrointestinal: Positive for nausea and vomiting  Negative for abdominal pain, blood in stool, constipation and diarrhea  Genitourinary: Negative for dysuria, flank pain, frequency and hematuria  Musculoskeletal: Negative for arthralgias, back pain, neck pain and neck stiffness  Skin: Negative for rash and wound  Neurological: Positive for headaches  Negative for dizziness, seizures, syncope, light-headedness and numbness  All other systems reviewed and are negative  Physical Exam  Physical Exam   Constitutional: She is oriented to person, place, and time  She appears well-developed and well-nourished  No distress  HENT:   Head: Normocephalic and atraumatic     Right Ear: External ear normal    Left Ear: External ear normal    Mouth/Throat: Oropharynx is clear and moist    Eyes: Conjunctivae and EOM are normal  Pupils are equal, round, and reactive to light  Neck: Normal range of motion  Neck supple  Cardiovascular: Normal rate, regular rhythm and normal heart sounds  Exam reveals no gallop and no friction rub  No murmur heard  Pulmonary/Chest: Effort normal and breath sounds normal  No respiratory distress  She has no wheezes  She has no rales  Abdominal: Soft  She exhibits no distension  Musculoskeletal: Normal range of motion  Neurological: She is alert and oriented to person, place, and time  A&O x 4  CN II-XII grossly intact  5/5 muscle strength all 4 extremities  Sensation to light touch normal and equal all extremities  Finger to nose intact bilat  Normal gait   Skin: Skin is warm and dry  She is not diaphoretic  Psychiatric: She has a normal mood and affect  Her behavior is normal  Judgment and thought content normal    Vitals reviewed        Vital Signs  ED Triage Vitals [09/22/18 1115]   Temperature Pulse Respirations Blood Pressure SpO2   97 8 °F (36 6 °C) 96 16 115/78 98 %      Temp Source Heart Rate Source Patient Position - Orthostatic VS BP Location FiO2 (%)   Oral Monitor Sitting Left arm --      Pain Score       Worst Possible Pain           Vitals:    09/22/18 1115 09/22/18 1210 09/22/18 1400   BP: 115/78 111/77 113/75   Pulse: 96 81 79   Patient Position - Orthostatic VS: Sitting Lying Lying       Visual Acuity      ED Medications  Medications   ketorolac (TORADOL) injection 15 mg (15 mg Intravenous Given 9/22/18 1226)   metoclopramide (REGLAN) injection 10 mg (10 mg Intravenous Given 9/22/18 1226)   diphenhydrAMINE (BENADRYL) injection 50 mg (50 mg Intravenous Given 9/22/18 1226)   magnesium sulfate 2 g/50 mL IVPB (premix) 2 g (0 g Intravenous Stopped 9/22/18 1427)   methylPREDNISolone sodium succinate (Solu-MEDROL) injection 125 mg (125 mg Intravenous Given 9/22/18 1358)       Diagnostic Studies  Results Reviewed     Procedure Component Value Units Date/Time    Urine Microscopic [[id number]]  (Abnormal) Collected:  09/22/18 1211    Lab Status:  Final result Specimen:  Urine from Urine, Clean Catch Updated:  09/22/18 1227     RBC, UA 0-1 (A) /hpf      WBC, UA 4-10 (A) /hpf      Epithelial Cells Moderate (A) /hpf      Bacteria, UA Occasional /hpf     POCT pregnancy, urine [[id number]]  (Normal) Resulted:  09/22/18 1203    Lab Status:  Final result Updated:  09/22/18 1203     EXT PREG TEST UR (Ref: Negative) NEGATIVE    POCT urinalysis dipstick [[id number]]  (Normal) Resulted:  09/22/18 1202    Lab Status:  Final result Updated:  09/22/18 1202     Color, UA yellow     Clarity, UA clear    ED Urine Macroscopic [[id number]]  (Abnormal) Collected:  09/22/18 1211    Lab Status:  Final result Specimen:  Urine Updated:  09/22/18 1201     Color, UA Yellow     Clarity, UA Clear     pH, UA 7 0     Leukocytes, UA Small (A)     Nitrite, UA Negative     Protein, UA Negative mg/dl      Glucose, UA Negative mg/dl      Ketones, UA Negative mg/dl      Urobilinogen, UA 0 2 E U /dl      Bilirubin, UA Negative     Blood, UA Negative     Specific Gravity, UA 1 015    Narrative:       CLINITEK RESULT                 No orders to display              Procedures  Procedures       Phone Contacts  ED Phone Contact    ED Course  ED Course as of Sep 22 1858   Sat Sep 22, 2018   1318 Pt reassessed and is not feeling better  Will try Mag and solumedrol  1 Pt states she feels much better now, feels ready to go home  Will plan to d/c                                MDM  Number of Diagnoses or Management Options  Migraine headache: established and worsening  Diagnosis management comments: 36 yo female presents to the ED for complaints of headache consistent with previous migraines  Did not respond to Toradol, Reglan, Benadryl, IV fluids  The added Solu-Medrol and magnesium with significant improvement in symptoms  Normal neurologic exam   No red flags to warrant imaging at this time    She is well-appearing with normal vital signs  She felt comfortable going home  Will discharge home with a 5 day course of prednisone as well as Reglan for prevention of rebound headaches  She is instructed to follow up with her neurologist through [facility] at the next available opportunity  Advised to return to ED with worsening symptoms  She understood and agreed with the treatment plan and had no questions    CritCare Time    Disposition  Final diagnoses:   Migraine headache     Time reflects when diagnosis was documented in both MDM as applicable and the Disposition within this note     Time User Action Codes Description Comment    9/22/2018  2:19 PM Baldomero Su Add [A43 940] Migraine headache       ED Disposition     ED Disposition Condition Comment    Discharge  Chin Almanza discharge to home/self care      Condition at discharge: Good        Follow-up Information     Follow up With Specialties Details Why Contact Info Additional Information    Your neurologist   Call Monday for next available appointment      [facility] Emergency Department Emergency Medicine  If symptoms worsen: worsening headache or neck pain, fever, chills [address] [facility] [id number] [phone] [place] [facility], [address], [place], [address]          Discharge Medication List as of 9/22/2018  2:22 PM      START taking these medications    Details   metoclopramide (REGLAN) 10 mg tablet Take 1 tablet (10 mg total) by mouth every 6 (six) hours as needed (headache) for up to 3 days, Starting Sat 9/22/2018, Until Tue 9/25/2018, Print      predniSONE 20 mg tablet Take 2 tablets (40 mg total) by mouth daily for 4 days, Starting Sat 9/22/2018, Until Wed 9/26/2018, Print         CONTINUE these medications which have NOT CHANGED    Details   albuterol (PROVENTIL HFA,VENTOLIN HFA) 90 mcg/act inhaler Inhale 2 puffs every 4 (four) hours as needed  , Starting Tue 1/3/2017, Historical Med clonazePAM (KlonoPIN) 2 mg tablet Take 2 mg by mouth 2 (two) times a day as needed for seizures  , Until Discontinued, Historical Med      estradiol (ESTRACE) 1 mg tablet Take 2 mg by mouth, Starting Wed 4/15/2015, Historical Med      omeprazole (PriLOSEC) 20 mg delayed release capsule Take 20 mg by mouth daily, Historical Med      QUEtiapine Fumarate (SEROQUEL PO) Take 800 mg by mouth daily at bedtime  , Until Discontinued, Historical Med      zolpidem (AMBIEN) 10 mg tablet Take 10 mg by mouth daily at bedtime, Starting Fri 4/6/2018, Historical Med           No discharge procedures on file      ED Provider  Electronically Signed by           Orville Vegas PA-C  09/22/18 7965

## 2018-10-24 ENCOUNTER — HOSPITAL ENCOUNTER (EMERGENCY)
Facility: HOSPITAL | Age: 43
Discharge: HOME | End: 2018-10-24
Attending: EMERGENCY MEDICINE | Admitting: EMERGENCY MEDICINE
Payer: COMMERCIAL

## 2018-10-24 VITALS
SYSTOLIC BLOOD PRESSURE: 119 MMHG | OXYGEN SATURATION: 96 % | WEIGHT: 191.6 LBS | TEMPERATURE: 97.8 F | RESPIRATION RATE: 14 BRPM | DIASTOLIC BLOOD PRESSURE: 77 MMHG | HEART RATE: 104 BPM

## 2018-10-24 DIAGNOSIS — G43.909 MIGRAINE HEADACHE: Primary | ICD-10-CM

## 2018-10-24 PROCEDURE — 96375 TX/PRO/DX INJ NEW DRUG ADDON: CPT

## 2018-10-24 PROCEDURE — 99283 EMERGENCY DEPT VISIT LOW MDM: CPT

## 2018-10-24 PROCEDURE — 96365 THER/PROPH/DIAG IV INF INIT: CPT

## 2018-10-24 PROCEDURE — 96366 THER/PROPH/DIAG IV INF ADDON: CPT

## 2018-10-24 RX ORDER — METOCLOPRAMIDE HYDROCHLORIDE 5 MG/ML
10 INJECTION INTRAMUSCULAR; INTRAVENOUS ONCE
Status: COMPLETED | OUTPATIENT
Start: 2018-10-24 | End: 2018-10-24

## 2018-10-24 RX ORDER — KETOROLAC TROMETHAMINE 30 MG/ML
15 INJECTION, SOLUTION INTRAMUSCULAR; INTRAVENOUS ONCE
Status: COMPLETED | OUTPATIENT
Start: 2018-10-24 | End: 2018-10-24

## 2018-10-24 RX ORDER — MAGNESIUM SULFATE HEPTAHYDRATE 40 MG/ML
2 INJECTION, SOLUTION INTRAVENOUS ONCE
Status: COMPLETED | OUTPATIENT
Start: 2018-10-24 | End: 2018-10-24

## 2018-10-24 RX ORDER — MORPHINE SULFATE 4 MG/ML
4 INJECTION, SOLUTION INTRAMUSCULAR; INTRAVENOUS ONCE
Status: COMPLETED | OUTPATIENT
Start: 2018-10-24 | End: 2018-10-24

## 2018-10-24 RX ORDER — DIPHENHYDRAMINE HYDROCHLORIDE 50 MG/ML
50 INJECTION INTRAMUSCULAR; INTRAVENOUS ONCE
Status: COMPLETED | OUTPATIENT
Start: 2018-10-24 | End: 2018-10-24

## 2018-10-24 RX ADMIN — SODIUM CHLORIDE 1000 ML: 0.9 INJECTION, SOLUTION INTRAVENOUS at 14:45

## 2018-10-24 RX ADMIN — DIPHENHYDRAMINE HYDROCHLORIDE 50 MG: 50 INJECTION, SOLUTION INTRAMUSCULAR; INTRAVENOUS at 14:45

## 2018-10-24 RX ADMIN — MAGNESIUM SULFATE IN WATER 2 G: 40 INJECTION, SOLUTION INTRAVENOUS at 14:44

## 2018-10-24 RX ADMIN — METOCLOPRAMIDE 10 MG: 5 INJECTION, SOLUTION INTRAMUSCULAR; INTRAVENOUS at 14:45

## 2018-10-24 RX ADMIN — KETOROLAC TROMETHAMINE 15 MG: 30 INJECTION, SOLUTION INTRAMUSCULAR at 14:45

## 2018-10-24 RX ADMIN — MORPHINE SULFATE 4 MG: 4 INJECTION INTRAVENOUS at 17:18

## 2018-10-24 NOTE — DISCHARGE INSTRUCTIONS
Migraña   LO QUE NECESITA SABER:   Condon es un dolor de nicolás intenso  El dolor puede ser tan severo que interfiere con antonieta actividades cotidianas  Condon puede durar desde pocas horas hasta varios días  La causa exacta de la migraña no es conocida  INSTRUCCIONES SOBRE EL FRANCO HOSPITALARIA:   Busque atención médica de inmediato si:   · Usted tiene dolor de nicolás que parece ser diferente o mucho peor que quinn migraña habitual     · Usted tiene un dolor de nicolás severo con fiebre o rigidez en el tobin  · Usted tiene nuevos problemas con el habla, la visión, el equilibrio o el [address]  · Usted siente que se va a desmayar, se siente confundido o sufre boo convulsión  Comuníquese con quinn médico o neurólogo si:   · Quinn migraña interfiere con antonieta actividades cotidianas  · Antonieta medicamentos o tratamientos asher de funcionar  · Usted tiene preguntas o inquietudes acerca de quinn condición o cuidado  Medicamentos:  [place] medicamento tan pronto keaton sienta que le comienza [place]ondon  · Un medicamento con receta para el dolor  podrían ser Lorene Lame  No espere a que el dolor sea muy intenso para bruce el medicamento  · Medicamentos para la migraña  se [place] para evitar boo migraña o detenerla boo vez que comience  · Los medicamentos contra las náuseas  pueden darse para calmar quinn estómago y ayudarle a prevenir los vómitos  Kelly medicamento también puede aliviar el dolor  · [place] antonieta medicamentos keaton se le haya indicado  Llame a quinn médico si usted piensa que el medicamento no está ayudando o si tiene efectos secundarios  Infórmele si es alérgico a cualquier medicamento  Mantenga boo lista actualizada de los [place], las vitaminas y los productos herbales que kelsi  Incluya los siguientes datos de los medicamentos: cantidad, frecuencia y motivo de administración  Traiga con usted la lista o los envases de la píldoras a antonieta citas de seguimiento   Lleve la lista de los medicamentos con usted en reagan de boo emergencia  [place] de quinn síntomas:   · Repose en boo habitación oscura y [place]  [place] ayudará a disminuir el dolor  El dormir también podría ayudarlo a aliviar quinn dolor  · Aplique hielo para reducir el dolor  Use un paquete de hielo o ponga hielo molido dentro de [employer]  Cubra el paquete con hielo con boo toalla y colóquela en quinn nicolás donde siente el dolor por 15 a 20 minutos cada hora  · Aplique calor para disminuir el dolor y los espasmos musculares  Utilice boo toalla pequeña empapada con [place], boo almohada térmica o tome un baño de manav con agua tibia  Aplique la compresa caliente sobre el área por 20 a 30 minutos cada 2 horas  Usted puede alternar el calor y el hielo  · Mantenga un registro de las migrañas  Escriba cuándo comienzan y terminan hui migrañas  [employer] [employer] y [place] haciendo cuando comenzó Condon  Registre lo que comió y lo que tomó las 24 horas antes de que comenzó quinn migraña  Mantenga un registro de lo que hizo para tratar quinn migraña y si funcionó  Programe boo troy con quinn médico o quinn neurólogo keaton se le indique:  Lleve hui registros de migrañas con usted cada vez que visite a quinn médico  Anote hui preguntas para que se acuerde de hacerlas quita hui visitas  Evite otra migraña:   · No fume  La nicotina y otros químicos en los cigarrillos y cigarros pueden desencadenar boo Jermain Prado y [place] provocar daño al pulmón  Pida información a quinn médico si usted actualmente fuma y necesita ayuda para dejar de fumar  Los cigarrillos electrónicos o tabaco sin humo todavía contienen nicotina  Consulte con quinn médico antes de [employer]  · No tome alcohol  El alcohol puede provocar migraña  También es posible que interfiera con los medicamentos utilizados para tratar quinn migraña  · Ejercítese regularmente  El ejercicio puede ayudar a evitar migrañas   Consulte con quinn médico acerca de cuál es el mejor Shad Anderson de ejercicio para usted  · Controle el estrés  El estrés podría provocar migraña  Aprenda nuevas maneras de relajarse keaton la respiración profunda  · Establezca un horario para dormir  Acuéstese y levántese a la misma hora cada día  · Coma hui comidas regularmente  Incluya alimentos [employer] fruta, verduras, panes de grano entero, productos lácteos bajos en grasa, frijoles, carne magra y pescado  No consuma alimentos o bebidas que puedan desencadenar hui migrañas  © 2016 [address] is for End User's use only and may not be sold, redistributed or otherwise used for commercial purposes  All illustrations and images included in CareNotes® are the copyrighted property of A D A M , Inc  or Dmitriy Swenson  Esta información es sólo para uso en educación  Quinn intención no es darle un consejo médico sobre enfermedades o tratamientos  Colsulte con quinn Rogelio Patch farmacéutico antes de seguir cualquier régimen médico para saber si es seguro y efectivo para usted

## 2018-10-24 NOTE — ED PROVIDER NOTES
History  Chief Complaint   Patient presents with    Migraine     pt reports she has a migraine x3 days  states hx of migraines but this one is even worse  denies trauma/injury  denies nausea or vomiting   sensitivity to lights and sounds  pressure behind eyes  History provided by:  Patient  Migraine   Location:  Frontal  Quality:  Pressure/throbbign  Severity:  Severe  Onset quality:  Gradual  Duration:  3 days  Timing:  Constant  Progression:  Worsening  Chronicity:  Recurrent  Context:  Hx of similar headaches in the past  feels like typical migraine  Relieved by:  Nothing  Worsened by:  Lights  Ineffective treatments: Otc medications  Associated symptoms: headaches    Associated symptoms: no abdominal pain, no chest pain, no congestion, no cough, no diarrhea, no ear pain, no fatigue, no fever, no loss of consciousness, no myalgias, no nausea, no rash, no rhinorrhea, no shortness of breath, no sore throat, no vomiting and no wheezing        Prior to Admission Medications   Prescriptions Last Dose Informant Patient Reported? Taking? QUEtiapine Fumarate (SEROQUEL PO)   Yes Yes   Sig: Take 800 mg by mouth daily at bedtime  albuterol (PROVENTIL HFA,VENTOLIN HFA) 90 mcg/act inhaler   Yes Yes   Sig: Inhale 2 puffs every 4 (four) hours as needed     clonazePAM (KlonoPIN) 2 mg tablet   Yes Yes   Sig: Take 2 mg by mouth 2 (two) times a day as needed for seizures  estradiol (ESTRACE) 1 mg tablet   Yes Yes   Sig: Take 2 mg by mouth   omeprazole (PriLOSEC) 20 mg delayed release capsule   Yes Yes   Sig: Take 20 mg by mouth daily   zolpidem (AMBIEN) 10 mg tablet   Yes Yes   Sig: Take 10 mg by mouth daily at bedtime      Facility-Administered Medications: None       Past Medical History:   Diagnosis Date    Asthma     Migraine     Pituitary tumor     Psychiatric disorder     Depression       Past Surgical History:   Procedure Laterality Date    HYSTERECTOMY         History reviewed   No pertinent family history  I have reviewed and agree with the history as documented  Social History   Substance Use Topics    Smoking status: Never Smoker    Smokeless tobacco: Never Used    Alcohol use No        Review of Systems   Constitutional: Negative for activity change, appetite change, fatigue and fever  HENT: Negative for congestion, dental problem, ear pain, rhinorrhea and sore throat  Eyes: Positive for photophobia  Negative for pain and redness  Respiratory: Negative for cough, chest tightness, shortness of breath and wheezing  Cardiovascular: Negative for chest pain and palpitations  Gastrointestinal: Negative for abdominal pain, blood in stool, constipation, diarrhea, nausea and vomiting  Endocrine: Negative for cold intolerance and heat intolerance  Genitourinary: Negative for dysuria, frequency and hematuria  Musculoskeletal: Negative for arthralgias, myalgias, neck pain and neck stiffness  Skin: Negative for color change, pallor and rash  Neurological: Positive for headaches  Negative for dizziness, tremors, loss of consciousness, syncope, facial asymmetry, speech difficulty, weakness, light-headedness and numbness  Hematological: Does not bruise/bleed easily  Psychiatric/Behavioral: Negative for agitation, hallucinations and suicidal ideas         Physical Exam  Physical Exam    Vital Signs  ED Triage Vitals   Temperature Pulse Respirations Blood Pressure SpO2   10/24/18 1331 10/24/18 1331 10/24/18 1331 10/24/18 1331 10/24/18 1331   97 8 °F (36 6 °C) (!) 106 16 116/69 99 %      Temp Source Heart Rate Source Patient Position - Orthostatic VS BP Location FiO2 (%)   10/24/18 1331 10/24/18 1609 10/24/18 1609 10/24/18 1609 --   Oral Monitor Lying Left arm       Pain Score       10/24/18 1331       Worst Possible Pain           Vitals:    10/24/18 1331 10/24/18 1609   BP: 116/69 119/77   Pulse: (!) 106 104   Patient Position - Orthostatic VS:  Lying       Visual Acuity  Visual Acuity Most Recent Value   L Pupil Size (mm)  4   R Pupil Size (mm)  4          ED Medications  Medications   sodium chloride 0 9 % bolus 1,000 mL (0 mL Intravenous Stopped 10/24/18 1708)   ketorolac (TORADOL) injection 15 mg (15 mg Intravenous Given 10/24/18 1445)   metoclopramide (REGLAN) injection 10 mg (10 mg Intravenous Given 10/24/18 1445)   diphenhydrAMINE (BENADRYL) injection 50 mg (50 mg Intravenous Given 10/24/18 1445)   magnesium sulfate 2 g/50 mL IVPB (premix) 2 g (0 g Intravenous Stopped 10/24/18 1644)   morphine (PF) 4 mg/mL injection 4 mg (4 mg Intravenous Given 10/24/18 1718)       Diagnostic Studies  Results Reviewed     None                 No orders to display              Procedures  Procedures       Phone Contacts  ED Phone Contact    ED Course                               MDM  Number of Diagnoses or Management Options  Diagnosis management comments: Headache in patient with history of headaches which is consistent with headache pattern, absence of red flag signs or symptoms, with a benign exam-will reassure, counseled, treat symptoms  CritCare Time    Disposition  Final diagnoses:   Migraine headache     Time reflects when diagnosis was documented in both MDM as applicable and the Disposition within this note     Time User Action Codes Description Comment    10/24/2018  4:59 PM Sherlyn LUJAN Add [G41 [id number]] Migraine headache       ED Disposition     ED Disposition Condition Comment    Discharge  Shahrzad Guzman discharge to home/self care      Condition at discharge: Stable        Follow-up Information     Follow up With Specialties Details Why [facility]   [address] [facility] [address] [phone]            Discharge Medication List as of 10/24/2018  4:59 PM      CONTINUE these medications which have NOT CHANGED    Details   albuterol (PROVENTIL HFA,VENTOLIN HFA) 90 mcg/act inhaler Inhale 2 puffs every 4 (four) hours as needed  , Starting Tue 1/3/2017, Historical Med      clonazePAM (KlonoPIN) 2 mg tablet Take 2 mg by mouth 2 (two) times a day as needed for seizures  , Until Discontinued, Historical Med      estradiol (ESTRACE) 1 mg tablet Take 2 mg by mouth, Starting Wed 4/15/2015, Historical Med      omeprazole (PriLOSEC) 20 mg delayed release capsule Take 20 mg by mouth daily, Historical Med      QUEtiapine Fumarate (SEROQUEL PO) Take 800 mg by mouth daily at bedtime  , Until Discontinued, Historical Med      zolpidem (AMBIEN) 10 mg tablet Take 10 mg by mouth daily at bedtime, Starting Fri 4/6/2018, Historical Med           No discharge procedures on file      ED Provider  Electronically Signed by           Darek Berg MD  10/25/18 8665

## 2018-11-14 ENCOUNTER — HOSPITAL ENCOUNTER (EMERGENCY)
Facility: HOSPITAL | Age: 43
Discharge: HOME | End: 2018-11-14
Attending: EMERGENCY MEDICINE | Admitting: EMERGENCY MEDICINE
Payer: COMMERCIAL

## 2018-11-14 VITALS
RESPIRATION RATE: 18 BRPM | TEMPERATURE: 97.7 F | WEIGHT: 191 LBS | SYSTOLIC BLOOD PRESSURE: 126 MMHG | OXYGEN SATURATION: 96 % | HEART RATE: 83 BPM | DIASTOLIC BLOOD PRESSURE: 86 MMHG

## 2018-11-14 DIAGNOSIS — G43.909 MIGRAINE: Primary | ICD-10-CM

## 2018-11-14 PROCEDURE — 99283 EMERGENCY DEPT VISIT LOW MDM: CPT

## 2018-11-14 PROCEDURE — 96374 THER/PROPH/DIAG INJ IV PUSH: CPT

## 2018-11-14 PROCEDURE — 96375 TX/PRO/DX INJ NEW DRUG ADDON: CPT

## 2018-11-14 PROCEDURE — 96361 HYDRATE IV INFUSION ADD-ON: CPT

## 2018-11-14 RX ORDER — KETOROLAC TROMETHAMINE 30 MG/ML
15 INJECTION, SOLUTION INTRAMUSCULAR; INTRAVENOUS ONCE
Status: COMPLETED | OUTPATIENT
Start: 2018-11-14 | End: 2018-11-14

## 2018-11-14 RX ORDER — METOCLOPRAMIDE HYDROCHLORIDE 5 MG/ML
10 INJECTION INTRAMUSCULAR; INTRAVENOUS ONCE
Status: COMPLETED | OUTPATIENT
Start: 2018-11-14 | End: 2018-11-14

## 2018-11-14 RX ORDER — BUTALBITAL, ACETAMINOPHEN AND CAFFEINE 50; 325; 40 MG/1; MG/1; MG/1
1 TABLET ORAL EVERY 4 HOURS PRN
Qty: 20 TABLET | Refills: 0 | Status: SHIPPED | OUTPATIENT
Start: 2018-11-14 | End: 2019-01-11

## 2018-11-14 RX ADMIN — SODIUM CHLORIDE 1000 ML: 0.9 INJECTION, SOLUTION INTRAVENOUS at 20:54

## 2018-11-14 RX ADMIN — METOCLOPRAMIDE 10 MG: 5 INJECTION, SOLUTION INTRAMUSCULAR; INTRAVENOUS at 20:54

## 2018-11-14 RX ADMIN — KETOROLAC TROMETHAMINE 15 MG: 30 INJECTION, SOLUTION INTRAMUSCULAR at 20:54

## 2018-11-15 NOTE — ED PROVIDER NOTES
History  Chief Complaint   Patient presents with    Migraine     Pt states "I have a migraine  they get worse everytime I get them"     51-year-old female with past medical history of migraine who is presenting headache  Patient states that approximately 2 hours prior to arrival she began to develop a frontal, bilateral, throbbing headache in the pattern of her usual migraine  Patient states that the possible trigger was inhaling cigarette smoke  Patient denies aura  Patient reported nausea at home but stated that she took oral Reglan and that the nausea improved  This is not the worst headache of patient's life  No fever, chills, vision changes, neck pain or stiffness, or extremity numbness or weakness  Patient has not followed with Neurology for her migraines  Incidentally, patient reports a history of pituitary adenoma which was discovered on MRI  No other concerns on review of systems  Assessment and plan:  51-year-old female presenting with headache in the pattern of her usual migraine  No red flag symptoms on history  Neurological examination nonfocal   Will treat patient with migraine cocktail consisting of IV fluids, Toradol, and Reglan  Will reassess after this  Prior to Admission Medications   Prescriptions Last Dose Informant Patient Reported? Taking? QUEtiapine Fumarate (SEROQUEL PO)   Yes Yes   Sig: Take 800 mg by mouth daily at bedtime  albuterol (PROVENTIL HFA,VENTOLIN HFA) 90 mcg/act inhaler   Yes Yes   Sig: Inhale 2 puffs every 4 (four) hours as needed     clonazePAM (KlonoPIN) 2 mg tablet   Yes Yes   Sig: Take 2 mg by mouth 2 (two) times a day as needed for seizures     estradiol (ESTRACE) 1 mg tablet   Yes Yes   Sig: Take 2 mg by mouth   omeprazole (PriLOSEC) 20 mg delayed release capsule   Yes Yes   Sig: Take 20 mg by mouth daily   zolpidem (AMBIEN) 10 mg tablet   Yes Yes   Sig: Take 10 mg by mouth daily at bedtime      Facility-Administered Medications: None Past Medical History:   Diagnosis Date    Asthma     Migraine     Pituitary tumor     Psychiatric disorder     Depression       Past Surgical History:   Procedure Laterality Date    HYSTERECTOMY         History reviewed  No pertinent family history  I have reviewed and agree with the history as documented  Social History   Substance Use Topics    Smoking status: Never Smoker    Smokeless tobacco: Never Used    Alcohol use No        Review of Systems   Constitutional: Negative for diaphoresis, fever and unexpected weight change  HENT: Negative for congestion, rhinorrhea and sore throat  Eyes: Negative for pain, discharge and visual disturbance  Respiratory: Negative for cough, shortness of breath and wheezing  Cardiovascular: Negative for chest pain, palpitations and leg swelling  Gastrointestinal: Positive for nausea (resolved)  Negative for abdominal pain, blood in stool, constipation, diarrhea and vomiting  Genitourinary: Negative for dysuria, flank pain and hematuria  Musculoskeletal: Negative for arthralgias and joint swelling  Skin: Negative for rash and wound  Allergic/Immunologic: Negative for environmental allergies and food allergies  Neurological: Positive for headaches  Negative for dizziness, seizures, weakness and numbness  Hematological: Negative for adenopathy  Psychiatric/Behavioral: Negative for confusion and hallucinations         Physical Exam  ED Triage Vitals   Temperature Pulse Respirations Blood Pressure SpO2   11/14/18 2018 11/14/18 2018 11/14/18 2018 11/14/18 2018 11/14/18 2018   97 7 °F (36 5 °C) 84 18 121/82 98 %      Temp src Heart Rate Source Patient Position - Orthostatic VS BP Location FiO2 (%)   -- 11/14/18 2203 11/14/18 2203 11/14/18 2203 --    Monitor Lying Left arm       Pain Score       11/14/18 2018       Worst Possible Pain           Orthostatic Vital Signs  Vitals:    11/14/18 2018 11/14/18 2203   BP: 121/82 126/86   Pulse: 84 83 Patient Position - Orthostatic VS:  Lying       Physical Exam   Constitutional: She is oriented to person, place, and time  She appears well-developed and well-nourished  No distress  HENT:   Head: Normocephalic and atraumatic  Right Ear: External ear normal    Left Ear: External ear normal    Eyes: Pupils are equal, round, and reactive to light  Conjunctivae and EOM are normal    Neck: Normal range of motion  Neck supple  No meningismus  Cardiovascular: Normal rate, regular rhythm and normal heart sounds  No murmur heard  Pulmonary/Chest: Effort normal and breath sounds normal  No respiratory distress  She has no wheezes  She has no rales  Abdominal: Soft  Bowel sounds are normal  She exhibits no distension  There is no tenderness  There is no guarding  Musculoskeletal: Normal range of motion  She exhibits no deformity  Neurological: She is alert and oriented to person, place, and time  Patient is alert and oriented to time, person, place, and situation  Speech is fluent with no aphasia or dysarthria  CN II-XII are intact  Strength is 5/5 in the upper and lower extremities bilaterally  Sensation grossly intact  No dysmetria on finger to nose testing  No pronator drift  Skin: Skin is warm and dry  Capillary refill takes less than 2 seconds  Psychiatric: She has a normal mood and affect  Her behavior is normal  Thought content normal    Nursing note and vitals reviewed        ED Medications  Medications   sodium chloride 0 9 % bolus 1,000 mL (0 mL Intravenous Stopped 11/14/18 2154)   ketorolac (TORADOL) injection 15 mg (15 mg Intravenous Given 11/14/18 2054)   metoclopramide (REGLAN) injection 10 mg (10 mg Intravenous Given 11/14/18 2054)       Diagnostic Studies  Results Reviewed     None                 No orders to display         Procedures  Procedures      Phone Consults  ED Phone Contact    ED Course  ED Course as of Nov 15 0013   Wed Nov 14, 2018   2026 Blood Pressure: 121/82   2026 Temperature: 97 7 °F (36 5 °C)   2026 Pulse: 84   2026 Respirations: 18   2203 Patient reassessed  She reports improvement of her symptoms with migraine cocktail  Will discharge  MDM  Number of Diagnoses or Management Options  Migraine: established and worsening  Diagnosis management comments:     Initial differential considerations include migraine, tension headache, subarachnoid hemorrhage, pituitary apoplexy  Patient has a known history of migraines and the present symptoms were similar to past migraines per patient  No neurological deficits were reported on history and neurological examination was nonfocal  No neck pain, stiffness, or limited range of motion  No fever  No imaging or labs were indicated  Patient was treated with IV fluids, IV Reglan, and IV Toradol and symptoms substantially improved  Discussed the results of evaluation with the patient  I reviewed instructions for discharge as well as return precautions  Patient understands and agrees with the evaluation in the ED, discharge instructions, and plan for follow-up  Amount and/or Complexity of Data Reviewed  Decide to obtain previous medical records or to obtain history from someone other than the patient: yes  Review and summarize past medical records: yes    Risk of Complications, Morbidity, and/or Mortality  Presenting problems: low  Diagnostic procedures: minimal  Management options: minimal    Patient Progress  Patient progress: improved    CritCare Time    Disposition  Final diagnoses:   Migraine     Time reflects when diagnosis was documented in both MDM as applicable and the Disposition within this note     Time User Action Codes Description Comment    11/14/2018 10:04 PM Jayy Burger Add [[id number]] Migraine       ED Disposition     ED Disposition Condition Comment    Discharge  Andrea Smith discharge to home/self care      Condition at discharge: Good        Follow-up Information     Follow up With Specialties Details Why Contact Info Additional [address] [place] [facility] Call As needed  [place] [address]  [facility] [address]  Neurology [facility] Neurology Call For follow-up on migraines  [facility] [address]  [address] [facility] Neurology [facility], [address] [facility], [place], [place], [address] Emergency Department Emergency Medicine Go to If symptoms worsen  [facility] [address]  [phone] [address] [facility] ED, [address] [facility] , [place], [place], [address]          Discharge Medication List as of 11/14/2018 10:05 PM      START taking these medications    Details   butalbital-acetaminophen-caffeine (FIORICET,ESGIC) -40 mg per tablet Take 1 tablet by mouth every 4 (four) hours as needed for headaches, Starting Wed 11/14/2018, Print         CONTINUE these medications which have NOT CHANGED    Details   albuterol (PROVENTIL HFA,VENTOLIN HFA) 90 mcg/act inhaler Inhale 2 puffs every 4 (four) hours as needed  , Starting Tue 1/3/2017, Historical Med      clonazePAM (KlonoPIN) 2 mg tablet Take 2 mg by mouth 2 (two) times a day as needed for seizures  , Until Discontinued, Historical Med      estradiol (ESTRACE) 1 mg tablet Take 2 mg by mouth, Starting Wed 4/15/2015, Historical Med      omeprazole (PriLOSEC) 20 mg delayed release capsule Take 20 mg by mouth daily, Historical Med      QUEtiapine Fumarate (SEROQUEL PO) Take 800 mg by mouth daily at bedtime  , Until Discontinued, Historical Med      zolpidem (AMBIEN) 10 mg tablet Take 10 mg by mouth daily at bedtime, Starting Fri 4/6/2018, Historical Med           No discharge procedures on file  ED Provider  Attending physically available and evaluated Kayybranden Anisa PERAZA managed the patient along with the ED Attending      Electronically Signed by         Rudolph Lopez MD  11/15/18 0016

## 2018-11-15 NOTE — DISCHARGE INSTRUCTIONS
Migraña   LO QUE NECESITA SABER:   Gabriel Kim es un dolor de nicolás intenso  El dolor puede ser tan severo que interfiere con antonieta actividades cotidianas  Gabriel Kim puede durar desde pocas horas hasta varios días  La causa exacta de la migraña no es conocida  INSTRUCCIONES SOBRE [facility]ITAL[facility]:   Busque atención médica de inmediato si:   · Usted tiene dolor de nicolás que parece ser diferente o mucho peor que quinn migraña habitual     · Usted tiene un dolor de nicolás severo con fiebre o rigidez en el tobin  · Usted tiene nuevos problemas con el habla, la visión, el equilibrio o el [address]  · Usted siente que se va a desmayar, se siente confundido o sufre boo convulsión  Comuníquese con quinn médico o neurólogo si:   · Quinn migraña interfiere con antonieta actividades cotidianas  · Antonieta medicamentos o tratamientos asher de funcionar  · Usted tiene preguntas o inquietudes acerca de quinn condición o cuidado  Medicamentos:  [place] medicamento tan pronto keaton sienta que le comienza Gabriel Kim  · Un medicamento con receta para el dolor  podrían ser Carmelita Nadeem  No espere a que el dolor sea muy intenso para bruce el medicamento  · Medicamentos para la migraña  se [place] para evitar boo migraña o detenerla boo vez que comience  · Los medicamentos contra las náuseas  pueden darse para calmar quinn estómago y ayudarle a prevenir los vómitos  Kelly medicamento también puede aliviar el dolor  · [place] antonieta medicamentos keaton se le haya indicado  Llame a quinn médico si usted piensa que el medicamento no está ayudando o si tiene efectos secundarios  Infórmele si es alérgico a cualquier medicamento  Mantenga boo lista actualizada de los [place], las vitaminas y los productos herbales que kelsi  Incluya los siguientes datos de los medicamentos: cantidad, frecuencia y motivo de administración  Traiga con usted la lista o los envases de la píldoras a antonieta citas de seguimiento   Lleve la lista de los medicamentos con usted en reagan de boo emergencia  [place] de quinn síntomas:   · Repose en boo habitación oscura y [place]  [place] ayudará a disminuir el dolor  El dormir también podría ayudarlo a aliviar quinn dolor  · Aplique hielo para reducir el dolor  Use un paquete de hielo o ponga hielo molido dentro de [employer]  Cubra el paquete con hielo con boo toalla y colóquela en quinn nicolás donde siente el dolor por 15 a 20 minutos cada hora  · Aplique calor para disminuir el dolor y los espasmos musculares  Utilice boo toalla pequeña empapada con [place], boo almohada térmica o tome un baño de manav con agua tibia  Aplique la compresa caliente sobre el área por 20 a 30 minutos cada 2 horas  Usted puede alternar el calor y el hielo  · Mantenga un registro de las migrañas  Escriba cuándo comienzan y terminan hui migrañas  Arby Kins y [place] haciendo cuando comenzó [place]  Registre lo que comió y lo que tomó las 24 horas antes de que comenzó quinn migraña  Mantenga un registro de lo que hizo para tratar quinn migraña y si funcionó  Programe boo troy con quinn médico o quinn neurólogo keaton se le indique:  Lleve hui registros de migrañas con usted cada vez que visite a quinn médico  Anote hui preguntas para que se acuerde de hacerlas quita hui visitas  Evite otra migraña:   · No fume  La nicotina y otros químicos en los cigarrillos y cigarros pueden desencadenar boo [place]  y [place] provocar daño al pulmón  Pida información a quinn médico si usted actualmente fuma y necesita ayuda para dejar de fumar  Los cigarrillos electrónicos o tabaco sin humo todavía contienen nicotina  Consulte con quinn médico antes de [employer]  · No tome alcohol  El alcohol puede provocar migraña  También es posible que interfiera con los medicamentos utilizados para tratar quinn migraña  · Ejercítese regularmente  El ejercicio puede ayudar a evitar migrañas   Consulte con quinn médico acerca de cuál es el mejor Esteban Samuel de ejercicio para usted  · Controle el estrés  El estrés podría provocar migraña  Aprenda nuevas maneras de relajarse keaton la respiración profunda  · Establezca un horario para dormir  Acuéstese y levántese a la misma hora cada día  · Coma hui comidas regularmente  Incluya alimentos [employer] fruta, verduras, panes de grano entero, productos lácteos bajos en grasa, frijoles, carne magra y pescado  No consuma alimentos o bebidas que puedan desencadenar hui migrañas  © 2016 [address] is for End User's use only and may not be sold, redistributed or otherwise used for commercial purposes  All illustrations and images included in CareNotes® are the copyrighted property of A D A M , Inc  or Dmitriy Swenson  Esta información es sólo para uso en educación  Quinn intención no es darle un consejo médico sobre enfermedades o tratamientos  Colsulte con quinn Jeff Mariana farmacéutico antes de seguir cualquier régimen médico para saber si es seguro y efectivo para usted

## 2018-11-22 ENCOUNTER — HOSPITAL ENCOUNTER (EMERGENCY)
Facility: HOSPITAL | Age: 43
Discharge: HOME | End: 2018-11-22
Attending: EMERGENCY MEDICINE | Admitting: EMERGENCY MEDICINE
Payer: COMMERCIAL

## 2018-11-22 VITALS
SYSTOLIC BLOOD PRESSURE: 120 MMHG | TEMPERATURE: 97.8 F | WEIGHT: 197 LBS | DIASTOLIC BLOOD PRESSURE: 76 MMHG | RESPIRATION RATE: 18 BRPM | OXYGEN SATURATION: 96 % | HEART RATE: 94 BPM

## 2018-11-22 DIAGNOSIS — R11.2 NAUSEA AND VOMITING: ICD-10-CM

## 2018-11-22 DIAGNOSIS — R51.9 HEADACHE: Primary | ICD-10-CM

## 2018-11-22 PROCEDURE — 96375 TX/PRO/DX INJ NEW DRUG ADDON: CPT

## 2018-11-22 PROCEDURE — 96361 HYDRATE IV INFUSION ADD-ON: CPT

## 2018-11-22 PROCEDURE — 99283 EMERGENCY DEPT VISIT LOW MDM: CPT

## 2018-11-22 PROCEDURE — 96374 THER/PROPH/DIAG INJ IV PUSH: CPT

## 2018-11-22 RX ORDER — METOCLOPRAMIDE HYDROCHLORIDE 5 MG/ML
10 INJECTION INTRAMUSCULAR; INTRAVENOUS ONCE
Status: COMPLETED | OUTPATIENT
Start: 2018-11-22 | End: 2018-11-22

## 2018-11-22 RX ORDER — KETOROLAC TROMETHAMINE 30 MG/ML
15 INJECTION, SOLUTION INTRAMUSCULAR; INTRAVENOUS ONCE
Status: COMPLETED | OUTPATIENT
Start: 2018-11-22 | End: 2018-11-22

## 2018-11-22 RX ADMIN — METOCLOPRAMIDE 10 MG: 5 INJECTION, SOLUTION INTRAMUSCULAR; INTRAVENOUS at 20:52

## 2018-11-22 RX ADMIN — SODIUM CHLORIDE 1000 ML: 0.9 INJECTION, SOLUTION INTRAVENOUS at 20:50

## 2018-11-22 RX ADMIN — KETOROLAC TROMETHAMINE 15 MG: 30 INJECTION, SOLUTION INTRAMUSCULAR at 20:50

## 2018-11-23 NOTE — ED PROVIDER NOTES
History  Chief Complaint   Patient presents with    Migraine     Complaint of migraine with hx of same however this migraine is stronger than typical  Symptoms since 1200  Frontal pain, vomiting, light and sound sensitivity, dizzy  This is a 71-year-old female with a history of migraines who presents with her typical migraine  The patient states that her headache began at 3:00 p m  This afternoon described as pressure behind her eyes, constant, radiating to her occiput, worsening in intensity throughout the day, associated with multiple episodes of nonbloody, nonbilious vomiting  States that this is similar to her previous headaches  She tried taking Fioricet without relief  Headache is made worse with bright lights and loud noises  Denies worst headache of her life or thunderclap features  Denies fever/chills, lightheadedness/dizziness, numbness/weakness, change in vision, URI symptoms, neck pain, chest pain, palpitations, shortness of breath, cough, back pain, flank pain, abdominal pain, diarrhea, hematochezia, melena, dysuria, hematuria, abnormal vaginal discharge/bleeding  Prior to Admission Medications   Prescriptions Last Dose Informant Patient Reported? Taking? QUEtiapine Fumarate (SEROQUEL PO)   Yes Yes   Sig: Take 800 mg by mouth daily at bedtime  butalbital-acetaminophen-caffeine (FIORICET,ESGIC) -40 mg per tablet 11/22/2018 at 1500  No Yes   Sig: Take 1 tablet by mouth every 4 (four) hours as needed for headaches   clonazePAM (KlonoPIN) 2 mg tablet   Yes Yes   Sig: Take 2 mg by mouth 2 (two) times a day as needed for seizures     estradiol (ESTRACE) 1 mg tablet   Yes Yes   Sig: Take 2 mg by mouth   omeprazole (PriLOSEC) 20 mg delayed release capsule   Yes Yes   Sig: Take 20 mg by mouth daily   zolpidem (AMBIEN) 10 mg tablet   Yes Yes   Sig: Take 10 mg by mouth daily at bedtime      Facility-Administered Medications: None       Past Medical History:   Diagnosis Date    Asthma     Migraine     Pituitary tumor     Psychiatric disorder     Depression       Past Surgical History:   Procedure Laterality Date    HYSTERECTOMY         History reviewed  No pertinent family history  I have reviewed and agree with the history as documented  Social History   Substance Use Topics    Smoking status: Never Smoker    Smokeless tobacco: Never Used    Alcohol use No        Review of Systems   Constitutional: Negative for chills and fever  HENT: Negative for rhinorrhea, sore throat and trouble swallowing  Eyes: Negative for photophobia and visual disturbance  Respiratory: Negative for cough, chest tightness and shortness of breath  Cardiovascular: Negative for chest pain, palpitations and leg swelling  Gastrointestinal: Positive for nausea and vomiting  Negative for abdominal pain, blood in stool and diarrhea  Endocrine: Negative for polyuria  Genitourinary: Negative for dysuria, flank pain, hematuria, vaginal bleeding and vaginal discharge  Musculoskeletal: Negative for back pain and neck pain  Skin: Negative for color change and rash  Allergic/Immunologic: Negative for immunocompromised state  Neurological: Positive for headaches  Negative for dizziness, weakness, light-headedness and numbness  All other systems reviewed and are negative  Physical Exam  ED Triage Vitals [11/22/18 2023]   Temperature Pulse Respirations Blood Pressure SpO2   97 8 °F (36 6 °C) 88 20 137/71 99 %      Temp Source Heart Rate Source Patient Position - Orthostatic VS BP Location FiO2 (%)   Temporal Monitor Sitting Right arm --      Pain Score       Worst Possible Pain           Orthostatic Vital Signs  Vitals:    11/22/18 2023   BP: 137/71   Pulse: 88   Patient Position - Orthostatic VS: Sitting       Physical Exam   Constitutional: Vital signs are normal  She appears well-developed and well-nourished  She is cooperative  Non-toxic appearance   She does not have a sickly appearance  She does not appear ill  No distress  HENT:   Head: Normocephalic and atraumatic  Right Ear: Hearing, tympanic membrane, external ear and ear canal normal    Left Ear: Hearing, tympanic membrane, external ear and ear canal normal    Nose: Nose normal    Mouth/Throat: Uvula is midline, oropharynx is clear and moist and mucous membranes are normal  No tonsillar exudate  Eyes: Pupils are equal, round, and reactive to light  Conjunctivae, EOM and lids are normal    Fundoscopic exam:       The right eye shows no AV nicking and no papilledema  The left eye shows no AV nicking and no papilledema  Neck: Trachea normal, normal range of motion and full passive range of motion without pain  Neck supple  No Brudzinski's sign and no Kernig's sign noted  No thyroid mass present  Cardiovascular: Normal rate, regular rhythm, normal heart sounds and normal pulses  No murmur heard  Pulmonary/Chest: Effort normal and breath sounds normal    Abdominal: Soft  Normal appearance and bowel sounds are normal  There is no tenderness  There is no rigidity, no rebound, no guarding and no CVA tenderness  Neurological: She is alert  She has normal strength and normal reflexes  No cranial nerve deficit or sensory deficit  She displays a negative Romberg sign  Coordination and gait normal    Reflex Scores:       Bicep reflexes are 2+ on the right side and 2+ on the left side  Patellar reflexes are 2+ on the right side and 2+ on the left side  Cranial nerves 2-12 intact, strength 5/5 throughout, sensation intact throughout  Visual fields normal  Normal finger-to-nose and heel-to-shin  Normal rapid alternating movements  Negative Romberg  Normal gait  DTRs are normal and symmetric  Psychiatric: She has a normal mood and affect   Her speech is normal and behavior is normal  Thought content normal        ED Medications  Medications   ketorolac (TORADOL) injection 15 mg (15 mg Intravenous Given 11/22/18 2050)   metoclopramide (REGLAN) injection 10 mg (10 mg Intravenous Given 11/22/18 2052)   sodium chloride 0 9 % bolus 1,000 mL (0 mL Intravenous Stopped 11/22/18 2154)       Diagnostic Studies  Results Reviewed     None                 No orders to display         Procedures  Procedures      Phone Consults  ED Phone Contact    ED Course                               MDM  Number of Diagnoses or Management Options  Diagnosis management comments: Symptomatic treatment with fluids, Toradol, Reglan  Ultimately, discharge  CritCare Time    Disposition  Final diagnoses:   Headache   Nausea and vomiting     Time reflects when diagnosis was documented in both MDM as applicable and the Disposition within this note     Time User Action Codes Description Comment    11/22/2018 10:11 PM Kathia Pinedo Add [R51] Headache     11/22/2018 10:11 PM Bashir PATEL Add [R11 2] Nausea and vomiting       ED Disposition     ED Disposition Condition Comment    Discharge  Erik Levo discharge to home/self care  Condition at discharge: Stable        Follow-up Information     Follow up With Specialties Details Why Contact Info Additional [address] [facility] Internal Medicine Schedule an appointment as soon as possible for a visit for follow up [facility] [id number]  [place] [address] Emergency Department Emergency Medicine Go to If symptoms worsen [facility]  [phone] [place] ED, [address] [place] [address], [phone]          Patient's Medications   Discharge Prescriptions    No medications on file     No discharge procedures on file  ED Provider  Attending physically available and evaluated Erik Levo  I managed the patient along with the ED Attending      Electronically Signed by         Geoavnna Kelly MD  11/22/18 0565

## 2018-11-23 NOTE — ED ATTENDING ATTESTATION
Phyllis Vázquez MD, saw and evaluated the patient  I have discussed the patient with the resident/non-physician practitioner and agree with the resident's/non-physician practitioner's findings, Plan of Care, and MDM as documented in the resident's/non-physician practitioner's note, except where noted  All available labs and Radiology studies were reviewed  At this point I agree with the current assessment done in the Emergency Department  I have conducted an independent evaluation of this patient a history and physical is as follows:      Critical Care Time  CritCare Time    Procedures     C/o  migraine headache since this afternoon - typical of her migraine headaches  Pressure behind eyes +n/v   She took fioricet without relief  No fevers, no neck pain, no altered mental status  No trauma  +photophobia    Well-appearing, no distress, PERRL, no papilledema, neck is nontender, FROM,  RRR, CTA b/l, abd  -nontender, ext  - no edema  Neuro wnl

## 2018-11-23 NOTE — DISCHARGE INSTRUCTIONS
Dolor de nicolás bj   LO QUE NECESITA SABER:   El dolor de [place] bj es un dolor o molestia que comienza de repeantonina y [place] rápidamente  Usted puede tener un dolor de nicolás bj sólo cuando siente estrés o come ciertos alimentos  Otro tipo dolor de nicolás bj puede producirse todos los días y a veces varias veces al día  INSTRUCCIONES SOBRE EL [facility]OSPITALARIA:   Regrese a la bonita de emergencias si:   · Usted tiene dolor intenso  · Usted tiene entumecimiento en un lado de quinn rolando o cuerpo  · Usted tiene un dolor de nicolás que ocurre después de un golpe en la nicolás, boo caída u otro trauma  · Tiene dolor de [place], está olvidadizo o confundido o tiene dificultad para hablar  · Tiene dolor de [place], rigidez en el tobin y [place]  Pregúntele a quinn Deepak Banker vitaminas y minerales son adecuados para usted  · Usted tiene un dolor de nicolás moody y está vomitando  · Usted tiene dolor de nicolás todos los días y no se [place] aun después de tratarlo  · Antonieta pop de [facility] u ocurren nuevos síntomas cuando tiene dolor de [place]  · Usted tiene preguntas o inquietudes acerca de quinn condición o cuidado  Medicamentos:  Es posible que usted necesite alguno de los siguientes:  · Un medicamento con receta para el dolor  podrían ser Lenor Harish  El medicamento que recomienda quinn médico dependerá del tipo de dolor de nicolás que tenga  Usted necesitará bruce medicamentos para el dolor de nicolás según las indicaciones para evitar un problema llamado dolor de nicolás de rebote  Estos pop de [place] ocurren con el uso regular de analgésicos para los trastornos de dolor de [place]  · AINEs (Analgésicos antiinflamatorios no esteroides) keaton el ibuprofeno, ayudan a disminuir la inflamación, el dolor y la [place]  Kelly medicamento esta disponible con o sin boo receta médica  Los AINEs pueden causar sangrado estomacal o problemas renales en ciertas personas   Si usted kelsi un medicamento anticoagulante, siempre pregúntele a quinn médico si los CYNTHIA son seguros para usted  Siempre charla la etiqueta de radhika medicamento y [place] instrucciones  · El acetaminofén  [place] el dolor y baja la fiebre  Está disponible sin receta médica  Pregunte la cantidad y la frecuencia con que debe tomarlos  [address]  Charla las etiquetas de todos los demás medicamentos que esté usando para saber si también contienen acetaminofén, o pregunte a quinn médico o farmacéutico  El acetaminofén puede causar daño en el hígado cuando no se kelsi de forma correcta  No use más de 3 gramos (3,000 miligramos) en total de acetaminofeno en un día  · Antidepresivos  se pueden administrar para algunos tipos de pop de [place]  · [place] hui medicamentos keaton se le haya indicado  Consulte con quinn médico si usted parish que quinn medicamento no le está ayudando o si presenta efectos secundarios  Infórmele si es alérgico a cualquier medicamento  Mantenga boo lista actualizada de los [place], las vitaminas y los productos herbales que kelsi  Incluya los siguientes datos de los medicamentos: cantidad, frecuencia y motivo de administración  Traiga con usted la lista o los envases de la píldoras a hui citas de seguimiento  Lleve la lista de los medicamentos con usted en reagan de boo emergencia  El manejo de quinn síntomas:   · Aplique hielo o calor  en la vanessa donde quinn hijo siente el dolor de nicolás  Utilice un paquete (compresa) de hielo o calor  Para un paquete de hielo, también puede colocar hielo molido en boo bolsa plástica  Cubra el paquete de hielo o la bolsa con boo toalla pequeña antes de aplicarla en la piel  Tanto el hielo keaton el calor ayudan a reducir el dolor, y el calor también contribuye a reducir los [employer]  Aplique calor quita 20 a 30 minutos cada 2 horas  Aplique hielo quita 15 a 20 minutos cada hora  Aplique calor o hielo quita el tiempo y la cantidad de días que se le indique   Usted puede alternar el calor y el hielo  · Relaje hui músculos  Acuéstese en boo posición cómoda y cierre hui ojos  Relaje hui músculos lentamente  Comience por los dedos de los pies y avance hacia arriba al virginia de quinn cuerpo  · Registre en un diario hui pop de [place]  Escriba cuándo comienzan y terminan hui migrañas  Jenniferyg Olivares y qué estaba haciendo cuando comenzó la migraña  Registre lo que comió y lo que tomó las 24 horas previas al comienzo de quinn migraña  Janice Mussel dolor y dónde le duele: Lleve un registro de lo que hizo para tratar quinn Shilpa Raring y si obtuvo un resultado satisfactorio  Cómo prevenir un dolor de nicolás bj:   · Evite cualquier cosa que provoque un dolor de nicolás bj  Los ejemplos incluyen la exposición a sustancias químicas, las grandes altitudes o no dormir lo suficiente  Mary [place] boo rutina para dormir  Acuéstese y [place] días a la misma hora  No utilice aparatos electrónicos antes de acostarse  Pueden provocarle un dolor de nicolás o impedirle dormir darinel  · No fume  La nicotina y otras sustancias químicas en los cigarrillos y puros pueden desencadenar un dolor de nicolás bj o [place]  Pida información a quinn médico si usted actualmente fuma y necesita ayuda para dejar de fumar  Los cigarrillos electrónicos o tabaco sin humo todavía contienen nicotina  Consulte con quinn médico antes de [employer]  · Limite el consumo de alcohol según le indicaron  El alcohol puede provocar un dolor de nicolás bj o empeorarlo  Si usted tiene pop de [place] de racimo, no lori alcohol quita un episodio  Para otros tipos de pop de [place], pregúntele a quinn proveedor de atención médica si es seguro para usted beber alcohol  Pregunte cuál es la cantidad ring que puede beber y con qué frecuencia  · Ejercítese según indicaciones  El ejercicio puede reducir la tensión y Elwanda Harm a aliviar el dolor de [place]   Propóngase hacer 30 minutos de [place] todos los días de la [place]  Quinn médico puede ayudarle a crear un plan de ejercicios  · Consuma alimentos saludables y variados  [address] frutas, verduras, productos lácteos bajos en grasa, jamal [place], pescado y frijoles cocidos  Quinn médico o dietista puede ayudarle a crear planes de comidas si desea evitar los alimentos que provocan pop de [place]  Acuda a hui consultas de control con quinn médico según le indicaron  Traiga quinn registro de pop de nicolás con usted cuando visite a quinn médico  Anote hui preguntas para que se acuerde de hacerlas quita hui visitas  © 2017 [address] Information is for End User's use only and may not be sold, redistributed or otherwise used for commercial purposes  All illustrations and images included in CareNotes® are the copyrighted property of A D A M , Inc  or Dmitriy Swenson  Esta información es sólo para uso en educación  Quinn intención no es darle un consejo médico sobre enfermedades o tratamientos  Colsulte con quinn Erik Pinta farmacéutico antes de seguir cualquier régimen médico para saber si es seguro y efectivo para usted

## 2019-01-11 ENCOUNTER — HOSPITAL ENCOUNTER (EMERGENCY)
Facility: HOSPITAL | Age: 44
Discharge: HOME | End: 2019-01-11
Attending: EMERGENCY MEDICINE
Payer: COMMERCIAL

## 2019-01-11 VITALS
HEART RATE: 79 BPM | OXYGEN SATURATION: 99 % | SYSTOLIC BLOOD PRESSURE: 124 MMHG | WEIGHT: 202 LBS | TEMPERATURE: 98.4 F | RESPIRATION RATE: 18 BRPM | DIASTOLIC BLOOD PRESSURE: 81 MMHG

## 2019-01-11 DIAGNOSIS — G43.909 MIGRAINE HEADACHE: Primary | ICD-10-CM

## 2019-01-11 PROCEDURE — 96365 THER/PROPH/DIAG IV INF INIT: CPT

## 2019-01-11 PROCEDURE — 96361 HYDRATE IV INFUSION ADD-ON: CPT

## 2019-01-11 PROCEDURE — 99283 EMERGENCY DEPT VISIT LOW MDM: CPT

## 2019-01-11 PROCEDURE — 96375 TX/PRO/DX INJ NEW DRUG ADDON: CPT

## 2019-01-11 RX ORDER — VALPROATE SODIUM 100 MG/ML
1000 INJECTION, SOLUTION INTRAVENOUS ONCE
Status: DISCONTINUED | OUTPATIENT
Start: 2019-01-11 | End: 2019-01-11 | Stop reason: CLARIF

## 2019-01-11 RX ORDER — METOCLOPRAMIDE HYDROCHLORIDE 5 MG/ML
10 INJECTION INTRAMUSCULAR; INTRAVENOUS ONCE
Status: COMPLETED | OUTPATIENT
Start: 2019-01-11 | End: 2019-01-11

## 2019-01-11 RX ORDER — KETOROLAC TROMETHAMINE 30 MG/ML
30 INJECTION, SOLUTION INTRAMUSCULAR; INTRAVENOUS ONCE
Status: COMPLETED | OUTPATIENT
Start: 2019-01-11 | End: 2019-01-11

## 2019-01-11 RX ORDER — PANTOPRAZOLE SODIUM 20 MG/1
20 TABLET, DELAYED RELEASE ORAL DAILY
COMMUNITY
End: 2019-08-05

## 2019-01-11 RX ADMIN — KETOROLAC TROMETHAMINE 30 MG: 30 INJECTION, SOLUTION INTRAMUSCULAR at 16:35

## 2019-01-11 RX ADMIN — VALPROATE SODIUM 1000 MG: 100 INJECTION, SOLUTION INTRAVENOUS at 18:11

## 2019-01-11 RX ADMIN — SODIUM CHLORIDE 1000 ML: 0.9 INJECTION, SOLUTION INTRAVENOUS at 16:30

## 2019-01-11 RX ADMIN — METOCLOPRAMIDE 10 MG: 5 INJECTION, SOLUTION INTRAMUSCULAR; INTRAVENOUS at 16:37

## 2019-01-11 NOTE — ED NOTES
Pharmacy called about Roz, stated they are working on the order now        Shar Dowell RN  01/11/19 9528

## 2019-01-11 NOTE — ED PROVIDER NOTES
History  Chief Complaint   Patient presents with    Headache - Recurrent or Known Dx Migraines     patient c/o of headache that started yesterday;     15-year-old female with a history of migraine headaches and asthma presents to the emergency department with a 1 day history of her typical migraine headache  She states this started on the left parietal region and now is on the right parietal region she describes it as pressure  She also feels lightheaded and nauseous  The light bothers her eyes  She did not take anything for the headache  She states his she is supposed to be making an appointment with Neurology because of her frequent migraine headaches  No fevers or neck stiffness  History provided by:  Patient   used: No    Migraine   Severity:  Unable to specify  Onset quality:  Gradual  Duration:  1 day  Timing:  Constant  Progression:  Unchanged  Chronicity:  New  Relieved by:  Nothing tried  Worsened by:  Light  Ineffective treatments:  Nothing tried  Associated symptoms: headaches and nausea    Associated symptoms: no abdominal pain, no chest pain, no congestion, no cough, no diarrhea, no fatigue, no fever, no loss of consciousness, no myalgias, no rash, no rhinorrhea, no shortness of breath, no sore throat, no vomiting and no wheezing        Prior to Admission Medications   Prescriptions Last Dose Informant Patient Reported? Taking? QUEtiapine Fumarate (SEROQUEL PO)   Yes Yes   Sig: Take 800 mg by mouth daily at bedtime  clonazePAM (KlonoPIN) 2 mg tablet   Yes Yes   Sig: Take 2 mg by mouth 2 (two) times a day as needed for seizures     estradiol (ESTRACE) 1 mg tablet   Yes Yes   Sig: Take 2 mg by mouth   omeprazole (PriLOSEC) 20 mg delayed release capsule   Yes Yes   Sig: Take 20 mg by mouth daily   pantoprazole (PROTONIX) 20 mg tablet   Yes Yes   Sig: Take 20 mg by mouth daily   zolpidem (AMBIEN) 10 mg tablet   Yes Yes   Sig: Take 10 mg by mouth daily at bedtime Facility-Administered Medications: None       Past Medical History:   Diagnosis Date    Asthma     Migraine     Pituitary tumor     Psychiatric disorder     Depression       Past Surgical History:   Procedure Laterality Date    HYSTERECTOMY         History reviewed  No pertinent family history  I have reviewed and agree with the history as documented  Social History   Substance Use Topics    Smoking status: Never Smoker    Smokeless tobacco: Never Used    Alcohol use No        Review of Systems   Constitutional: Negative  Negative for chills, diaphoresis, fatigue and fever  HENT: Negative  Negative for congestion, rhinorrhea and sore throat  Eyes: Negative  Negative for discharge, redness and itching  Respiratory: Negative  Negative for apnea, cough, chest tightness, shortness of breath and wheezing  Cardiovascular: Negative for chest pain, palpitations and leg swelling  Gastrointestinal: Positive for nausea  Negative for abdominal pain, diarrhea and vomiting  Endocrine: Negative  Genitourinary: Negative  Negative for flank pain, frequency and urgency  Musculoskeletal: Negative  Negative for back pain and myalgias  Skin: Negative  Negative for rash  Allergic/Immunologic: Negative  Neurological: Positive for headaches  Negative for dizziness, tremors, seizures, loss of consciousness, syncope, facial asymmetry, speech difficulty, weakness, light-headedness and numbness  Hematological: Negative  All other systems reviewed and are negative  Physical Exam  Physical Exam   Constitutional: She is oriented to person, place, and time  She appears well-developed and well-nourished  Non-toxic appearance  She does not have a sickly appearance  She does not appear ill  No distress  HENT:   Head: Normocephalic and atraumatic     Right Ear: Tympanic membrane and external ear normal    Left Ear: Tympanic membrane and external ear normal    Nose: Nose normal    Mouth/Throat: Oropharynx is clear and moist  No oropharyngeal exudate  Eyes: Pupils are equal, round, and reactive to light  Conjunctivae and EOM are normal  Right eye exhibits no discharge  Left eye exhibits no discharge  No scleral icterus  Neck: Normal range of motion  Neck supple  Cardiovascular: Normal rate, regular rhythm and normal heart sounds  Exam reveals no gallop and no friction rub  No murmur heard  Pulmonary/Chest: Effort normal and breath sounds normal  No respiratory distress  She has no wheezes  She has no rales  She exhibits no tenderness  Abdominal: Soft  Bowel sounds are normal  She exhibits no distension and no mass  There is no tenderness  No hernia  Musculoskeletal: Normal range of motion  She exhibits no edema, tenderness or deformity  Lymphadenopathy:     She has no cervical adenopathy  Neurological: She is alert and oriented to person, place, and time  She has normal reflexes  She displays normal reflexes  No cranial nerve deficit  She exhibits normal muscle tone  Coordination normal    Skin: Skin is warm and dry  No rash noted  She is not diaphoretic  No erythema  No pallor  Psychiatric: She has a normal mood and affect  Nursing note and vitals reviewed        Vital Signs  ED Triage Vitals   Temperature Pulse Respirations Blood Pressure SpO2   01/11/19 1601 01/11/19 1601 01/11/19 1601 01/11/19 1603 01/11/19 1601   98 4 °F (36 9 °C) 83 18 143/94 99 %      Temp Source Heart Rate Source Patient Position - Orthostatic VS BP Location FiO2 (%)   01/11/19 1601 01/11/19 1601 01/11/19 1601 01/11/19 1601 --   Temporal Monitor Sitting Right arm       Pain Score       01/11/19 1601       Worst Possible Pain           Vitals:    01/11/19 1601 01/11/19 1603 01/11/19 1800   BP:  143/94 124/81   Pulse: 83  79   Patient Position - Orthostatic VS: Sitting  Lying       Visual Acuity      ED Medications  Medications   valproate (DEPACON) 1,000 mg in sodium chloride 0 9 % 50 mL IVPB (1,000 mg Intravenous New Bag 1/11/19 1811)   sodium chloride 0 9 % bolus 1,000 mL (0 mL Intravenous Stopped 1/11/19 1730)   ketorolac (TORADOL) injection 30 mg (30 mg Intravenous Given 1/11/19 1635)   metoclopramide (REGLAN) injection 10 mg (10 mg Intravenous Given 1/11/19 1637)       Diagnostic Studies  Results Reviewed     None                 No orders to display              Procedures  Procedures       Phone Contacts  ED Phone Contact    ED Course  ED Course as of Jan 11 1852 Fri Jan 11, 2019   1850 Patient feeling better  Stable for discharge                                MDM  Number of Diagnoses or Management Options  Diagnosis management comments: 45-year-old female presents with typical migraine headache over the last day  No fevers or neck pain  She has not taken anything for the discomfort  On exam she appears in no acute distress and ambulates normally from the bathroom to her room  Her neuro exam here is normal   On review of the old records patient receives Toradol and Reglan which she is asking for today  CritCare Time    Disposition  Final diagnoses:   Migraine headache     Time reflects when diagnosis was documented in both MDM as applicable and the Disposition within this note     Time User Action Codes Description Comment    1/11/2019  6:51 PM Faby Done A Add [G43 909] Migraine headache       ED Disposition     ED Disposition Condition Comment    Discharge  [facility] discharge to home/self care  Condition at discharge: Good        Follow-up Information     Follow up With Specialties Details [facility] Schedule an appointment as soon as possible for a visit in 3 days  [facility] [id number] [phone]            Patient's Medications   Discharge Prescriptions    No medications on file     No discharge procedures on file      ED Provider  Electronically Signed by           Chelle Brandon DO  01/11/19 0374

## 2019-01-11 NOTE — ED NOTES
Pt reports no relief after medication admin, provider notified       Marcin Bowden RN  01/11/19 8491

## 2019-01-11 NOTE — DISCHARGE INSTRUCTIONS
Migraña   LO QUE NECESITA SABER:   Zainab Darvin es un dolor de nicolás intenso  El dolor puede ser tan severo que interfiere con antonieta actividades cotidianas  Zainab Darvin puede durar desde pocas horas hasta varios días  La causa exacta de la migraña no es conocida  INSTRUCCIONES SOBRE [facility]ITAL[facility]:   Regrese a la bonita de emergencias si:   · Usted tiene dolor de nicolás que parece ser diferente o mucho peor que quinn migraña habitual     · Usted tiene un dolor de nicolás severo con fiebre o rigidez en el tobin  · Usted tiene nuevos problemas con el habla, la visión, el equilibrio o el [address]  · Usted siente que se va a desmayar, se siente confundido o sufre boo convulsión  Comuníquese con quinn médico o neurólogo si:   · Quinn migraña interfiere con antonieta actividades cotidianas  · Antonieta medicamentos o tratamientos asher de funcionar  · Usted tiene preguntas o inquietudes acerca de quinn condición o cuidado  Medicamentos:  Usted podría  necesitar alguno de los siguientes  [place] medicamento tan pronto keaton sienta que le comienza Zainab Darvin  · Un medicamento con receta para el dolor  podrían ser Alfie Primmer  No espere a que el dolor sea muy intenso para bruce el medicamento  · Medicamentos para la migraña  se [place] para evitar boo migraña o detenerla boo vez que comience  · Los medicamentos contra las náuseas  pueden darse para calmar quinn estómago y ayudarle a prevenir los vómitos  [place] medicamento también puede aliviar el dolor  · [place] antonieta medicamentos keaton se le haya indicado  Consulte con quinn médico si usted parish que quinn medicamento no le está ayudando o si presenta efectos secundarios  Infórmele si es alérgico a cualquier medicamento  Mantenga boo lista actualizada de los [place], las vitaminas y los productos herbales que kelsi  Incluya los siguientes datos de los medicamentos: cantidad, frecuencia y motivo de administración   Traiga con usted la lista o los envases de la píldoras a antonieta citas de seguimiento  Lleve la lista de los medicamentos con usted en reagan de boo emergencia  [place] de qiunn síntomas:   · Repose en boo habitación oscura y [place] ayudará a disminuir el dolor  El dormir también podría ayudarlo a aliviar quinn dolor  · Aplique hielo para reducir el dolor  Use un paquete de hielo o ponga hielo molido dentro de [employer]  Cubra el paquete de hielo con boo toalla y colóqueselo en la nicolás  Aplique hielo quita 15 a 20 minutos cada hora  · Aplique calor para disminuir el dolor y los espasmos musculares  Utilice boo toalla pequeña empapada con [place], boo almohada térmica o tome un baño de manav con agua tibia  Aplique la compresa caliente sobre el área por 20 a 30 minutos cada 2 horas  Usted puede alternar el calor y el hielo  · Mantenga un registro de las migrañas  Escriba cuándo comienzan y terminan hui migrañas  [place] y [place] haciendo cuando comenzó [place]  Registre lo que comió y lo que tomó las 24 horas antes de que comenzó quinn migraña  Mantenga un registro de lo que hizo para tratar quinn migraña y si funcionó  Traiga el registro de las migrañas con usted a las citas con quinn médico   Programe boo troy con quinn médico o quinn neurólogo keaton se le indique:  Lleve quinn registro de migrañas con usted  Anote hui preguntas para que se acuerde de hacerlas quita hui visitas  Evite otra migraña:   · No fume  La nicotina y otras sustancias químicas en los cigarrillos y puros pueden desencadenar boo Correne Austin  Pida información a quinn médico si usted actualmente fuma y necesita ayuda para dejar de fumar  Los cigarrillos electrónicos o tabaco sin humo todavía contienen nicotina  Consulte con quinn médico antes de [employer]  · No consuma alcohol  El alcohol puede provocar migraña  También puede impedir que [employer] medicamentos para la migraña  · Ejercítese regularmente    El ejercicio puede ayudar a evitar migrañas  Consulte con camara médico acerca de cuál es el mejor régimen de ejercicio para usted  Trate de hacer unos 30 minutos de ejercicio todos los días que pueda  · Controle el estrés  El estrés podría provocar migraña  Aprenda nuevas maneras de relajarse keaton la respiración profunda  · Establezca un horario para dormir  Acuéstese y levántese a la misma hora cada día  No anh televisión inmediatamente antes de acostarse  · Coma hui comidas regularmente  Incluya alimentos [employer] fruta, verduras, panes de grano entero, productos lácteos bajos en grasa, frijoles, carne magra y pescado  No consuma alimentos o bebidas que puedan desencadenar hui migrañas  © 2017 [address] Information is for End User's use only and may not be sold, redistributed or otherwise used for commercial purposes  All illustrations and images included in CareNotes® are the copyrighted property of A D A M , Inc  or Dmitriy Swenson  Esta información es sólo para uso en educación  Camara intención no es darle un consejo médico sobre enfermedades o tratamientos  Colsulte con camara Allyson Segundo farmacéutico antes de seguir cualquier régimen médico para saber si es seguro y efectivo para usted

## 2019-08-05 ENCOUNTER — HOSPITAL ENCOUNTER (EMERGENCY)
Facility: HOSPITAL | Age: 44
Discharge: HOME | End: 2019-08-05
Attending: EMERGENCY MEDICINE | Admitting: EMERGENCY MEDICINE
Payer: COMMERCIAL

## 2019-08-05 VITALS
OXYGEN SATURATION: 98 % | RESPIRATION RATE: 18 BRPM | TEMPERATURE: 97.3 F | SYSTOLIC BLOOD PRESSURE: 121 MMHG | HEART RATE: 81 BPM | WEIGHT: 178.57 LBS | DIASTOLIC BLOOD PRESSURE: 81 MMHG

## 2019-08-05 DIAGNOSIS — N93.9 VAGINAL BLEEDING: Primary | ICD-10-CM

## 2019-08-05 LAB
BASOPHILS # BLD AUTO: 0.02 THOUSANDS/ΜL (ref 0–0.1)
BASOPHILS NFR BLD AUTO: 0 % (ref 0–1)
EOSINOPHIL # BLD AUTO: 0.07 THOUSAND/ΜL (ref 0–0.61)
EOSINOPHIL NFR BLD AUTO: 1 % (ref 0–6)
ERYTHROCYTE [DISTWIDTH] IN BLOOD BY AUTOMATED COUNT: 13.6 % (ref 11.6–15.1)
HCT VFR BLD AUTO: 38.5 % (ref 34.8–46.1)
HGB BLD-MCNC: 12.3 G/DL (ref 11.5–15.4)
IMM GRANULOCYTES # BLD AUTO: 0.01 THOUSAND/UL (ref 0–0.2)
IMM GRANULOCYTES NFR BLD AUTO: 0 % (ref 0–2)
LYMPHOCYTES # BLD AUTO: 1.75 THOUSANDS/ΜL (ref 0.6–4.47)
LYMPHOCYTES NFR BLD AUTO: 30 % (ref 14–44)
MCH RBC QN AUTO: 29.3 PG (ref 26.8–34.3)
MCHC RBC AUTO-ENTMCNC: 31.9 G/DL (ref 31.4–37.4)
MCV RBC AUTO: 92 FL (ref 82–98)
MONOCYTES # BLD AUTO: 0.47 THOUSAND/ΜL (ref 0.17–1.22)
MONOCYTES NFR BLD AUTO: 8 % (ref 4–12)
NEUTROPHILS # BLD AUTO: 3.51 THOUSANDS/ΜL (ref 1.85–7.62)
NEUTS SEG NFR BLD AUTO: 61 % (ref 43–75)
NRBC BLD AUTO-RTO: 0 /100 WBCS
PLATELET # BLD AUTO: 249 THOUSANDS/UL (ref 149–390)
PMV BLD AUTO: 10.3 FL (ref 8.9–12.7)
RBC # BLD AUTO: 4.2 MILLION/UL (ref 3.81–5.12)
WBC # BLD AUTO: 5.83 THOUSAND/UL (ref 4.31–10.16)

## 2019-08-05 PROCEDURE — 85025 COMPLETE CBC W/AUTO DIFF WBC: CPT | Performed by: EMERGENCY MEDICINE

## 2019-08-05 PROCEDURE — 36415 COLL VENOUS BLD VENIPUNCTURE: CPT | Performed by: EMERGENCY MEDICINE

## 2019-08-05 PROCEDURE — 99283 EMERGENCY DEPT VISIT LOW MDM: CPT | Performed by: EMERGENCY MEDICINE

## 2019-08-05 PROCEDURE — 99284 EMERGENCY DEPT VISIT MOD MDM: CPT

## 2019-08-05 NOTE — DISCHARGE INSTRUCTIONS
Llame a quinn obstetra nuevamente por la mañana para informarle que estuvo en la bonita de emergencias  Continúe siguiendo hui recomendaciones para la evaluación y [place] de quinn sangrado

## 2019-08-05 NOTE — ED PROVIDER NOTES
History  Chief Complaint   Patient presents with    Vaginal Bleeding     vaginal bleeding for past 4 days, denies pain, reports bleeding after sexual intercourse  39 YO female presents with intermittent vaginal bleeding for the last 4 days  States this has been mostly with intercourse, notes 1 episode of bleeding during intercourse  She states this has been a moderate amount, unsure regarding clots  Pt denies abdominal pain  She has a Hx of hysterectomy  Pt states she saw her OB for this, had an internal exam and was tested for STD's  States she had an U/S ordered for the 18th of this month but does not wish to wait this long so she presents to the ED  Pt denies CP/SOB/F/C/N/V/D/C, no dysuria, burning on urination or blood in urine  History provided by:  Patient   used: No    Vaginal Bleeding   Quality:  Dark red  Severity:  Moderate  Onset quality:  Gradual  Duration:  4 days  Timing:  Intermittent  Progression:  Waxing and waning  Chronicity:  New  Possible pregnancy: no    Context: after intercourse and during intercourse    Relieved by:  Nothing  Worsened by:  Nothing  Ineffective treatments:  None tried  Associated symptoms: no abdominal pain, no dizziness, no dyspareunia, no dysuria, no fatigue, no fever, no nausea and no vaginal discharge        Prior to Admission Medications   Prescriptions Last Dose Informant Patient Reported? Taking? QUEtiapine Fumarate (SEROQUEL PO)   Yes No   Sig: Take 800 mg by mouth daily at bedtime  clonazePAM (KlonoPIN) 2 mg tablet   Yes No   Sig: Take 2 mg by mouth 2 (two) times a day as needed for seizures     estradiol (ESTRACE) 1 mg tablet   Yes No   Sig: Take 2 mg by mouth   zolpidem (AMBIEN) 10 mg tablet   Yes No   Sig: Take 10 mg by mouth daily at bedtime      Facility-Administered Medications: None       Past Medical History:   Diagnosis Date    Asthma     Migraine     Pituitary tumor     Psychiatric disorder     Depression       Past Surgical History:   Procedure Laterality Date    HYSTERECTOMY         History reviewed  No pertinent family history  I have reviewed and agree with the history as documented  Social History     Tobacco Use    Smoking status: Never Smoker    Smokeless tobacco: Never Used   Substance Use Topics    Alcohol use: No    Drug use: No        Review of Systems   Constitutional: Negative for chills, fatigue and fever  HENT: Negative for dental problem  Eyes: Negative for visual disturbance  Respiratory: Negative for shortness of breath  Cardiovascular: Negative for chest pain  Gastrointestinal: Negative for abdominal pain, diarrhea, nausea and vomiting  Genitourinary: Positive for vaginal bleeding  Negative for dyspareunia, dysuria, frequency and vaginal discharge  Musculoskeletal: Negative for arthralgias  Skin: Negative for rash  Neurological: Negative for dizziness, weakness and light-headedness  Psychiatric/Behavioral: Negative for agitation, behavioral problems and confusion  All other systems reviewed and are negative  Physical Exam  Physical Exam   Constitutional: She is oriented to person, place, and time  She appears well-developed and well-nourished  HENT:   Head: Normocephalic and atraumatic  Eyes: EOM are normal    Neck: Normal range of motion  Cardiovascular: Normal rate, regular rhythm and normal heart sounds  Pulmonary/Chest: Effort normal and breath sounds normal    Abdominal: Soft  There is no tenderness  Musculoskeletal: Normal range of motion  Neurological: She is alert and oriented to person, place, and time  Skin: Skin is warm and dry  Psychiatric: She has a normal mood and affect  Her behavior is normal  Thought content normal    Nursing note and vitals reviewed        Vital Signs  ED Triage Vitals [08/05/19 1713]   Temperature Pulse Respirations Blood Pressure SpO2   (!) 97 3 °F (36 3 °C) 90 18 127/77 97 %      Temp Source Heart Rate Source Patient Position - Orthostatic VS BP Location FiO2 (%)   Temporal Monitor Sitting Right arm --      Pain Score       No Pain           Vitals:    08/05/19 1713 08/05/19 1831   BP: 127/77 121/81   Pulse: 90 81   Patient Position - Orthostatic VS: Sitting Sitting         Visual Acuity      ED Medications  Medications - No data to display    Diagnostic Studies  Results Reviewed     Procedure Component Value Units Date/Time    CBC and differential [[id number]] Collected:  08/05/19 1744    Lab Status:  Final result Specimen:  Blood from Arm, Right Updated:  08/05/19 1750     WBC 5 83 Thousand/uL      RBC 4 20 Million/uL      Hemoglobin 12 3 g/dL      Hematocrit 38 5 %      MCV 92 fL      MCH 29 3 pg      MCHC 31 9 g/dL      RDW 13 6 %      MPV 10 3 fL      Platelets 972 Thousands/uL      nRBC 0 /100 WBCs      Neutrophils Relative 61 %      Immat GRANS % 0 %      Lymphocytes Relative 30 %      Monocytes Relative 8 %      Eosinophils Relative 1 %      Basophils Relative 0 %      Neutrophils Absolute 3 51 Thousands/µL      Immature Grans Absolute 0 01 Thousand/uL      Lymphocytes Absolute 1 75 Thousands/µL      Monocytes Absolute 0 47 Thousand/µL      Eosinophils Absolute 0 07 Thousand/µL      Basophils Absolute 0 02 Thousands/µL                  No orders to display              Procedures  Procedures       ED Course                               MDM  Number of Diagnoses or Management Options  Vaginal bleeding: new and requires workup  Diagnosis management comments: 1  Vaginal bleeding - Pt with Hx of hysterectomy, being evaluated by OB and with U/S scheduled as an outpatient  Will order CBC to rule out anemia         Amount and/or Complexity of Data Reviewed  Clinical lab tests: ordered and reviewed  Review and summarize past medical records: yes    Patient Progress  Patient progress: stable      Disposition  Final diagnoses:   Vaginal bleeding     Time reflects when diagnosis was documented in both MDM as applicable and the Disposition within this note     Time User Action Codes Description Comment    8/5/2019  6:20 PM Luis Eng E Add [N93 9] Vaginal bleeding       ED Disposition     ED Disposition Condition Date/Time Comment    Discharge Stable Mon Aug 5, 2019  6:20 PM Gabriel Escobar discharge to home/self care  Follow-up Information    None         Discharge Medication List as of 8/5/2019  6:21 PM      CONTINUE these medications which have NOT CHANGED    Details   clonazePAM (KlonoPIN) 2 mg tablet Take 2 mg by mouth 2 (two) times a day as needed for seizures  , Until Discontinued, Historical Med      estradiol (ESTRACE) 1 mg tablet Take 2 mg by mouth, Starting Wed 4/15/2015, Historical Med      QUEtiapine Fumarate (SEROQUEL PO) Take 800 mg by mouth daily at bedtime  , Until Discontinued, Historical Med      zolpidem (AMBIEN) 10 mg tablet Take 10 mg by mouth daily at bedtime, Starting Fri 4/6/2018, Historical Med           No discharge procedures on file      ED Provider  Electronically Signed by           Linda Curran MD  08/05/19 5916

## 2019-11-19 ENCOUNTER — HOSPITAL ENCOUNTER (EMERGENCY)
Facility: HOSPITAL | Age: 44
Discharge: HOME | End: 2019-11-19
Attending: EMERGENCY MEDICINE
Payer: COMMERCIAL

## 2019-11-19 VITALS
OXYGEN SATURATION: 98 % | TEMPERATURE: 98 F | RESPIRATION RATE: 16 BRPM | WEIGHT: 167.55 LBS | SYSTOLIC BLOOD PRESSURE: 100 MMHG | HEART RATE: 70 BPM | DIASTOLIC BLOOD PRESSURE: 60 MMHG

## 2019-11-19 DIAGNOSIS — R51.9 HEADACHE: Primary | ICD-10-CM

## 2019-11-19 PROCEDURE — 99284 EMERGENCY DEPT VISIT MOD MDM: CPT | Performed by: EMERGENCY MEDICINE

## 2019-11-19 PROCEDURE — 96375 TX/PRO/DX INJ NEW DRUG ADDON: CPT

## 2019-11-19 PROCEDURE — 96361 HYDRATE IV INFUSION ADD-ON: CPT

## 2019-11-19 PROCEDURE — 99283 EMERGENCY DEPT VISIT LOW MDM: CPT

## 2019-11-19 PROCEDURE — 96374 THER/PROPH/DIAG INJ IV PUSH: CPT

## 2019-11-19 RX ORDER — METOCLOPRAMIDE 10 MG/1
10 TABLET ORAL EVERY 6 HOURS
Qty: 30 TABLET | Refills: 0 | Status: SHIPPED | OUTPATIENT
Start: 2019-11-19 | End: 2020-11-06

## 2019-11-19 RX ORDER — KETOROLAC TROMETHAMINE 30 MG/ML
15 INJECTION, SOLUTION INTRAMUSCULAR; INTRAVENOUS ONCE
Status: COMPLETED | OUTPATIENT
Start: 2019-11-19 | End: 2019-11-19

## 2019-11-19 RX ORDER — DIPHENHYDRAMINE HYDROCHLORIDE 50 MG/ML
25 INJECTION INTRAMUSCULAR; INTRAVENOUS ONCE
Status: COMPLETED | OUTPATIENT
Start: 2019-11-19 | End: 2019-11-19

## 2019-11-19 RX ORDER — METOCLOPRAMIDE HYDROCHLORIDE 5 MG/ML
10 INJECTION INTRAMUSCULAR; INTRAVENOUS ONCE
Status: COMPLETED | OUTPATIENT
Start: 2019-11-19 | End: 2019-11-19

## 2019-11-19 RX ADMIN — DIPHENHYDRAMINE HYDROCHLORIDE 25 MG: 50 INJECTION, SOLUTION INTRAMUSCULAR; INTRAVENOUS at 11:30

## 2019-11-19 RX ADMIN — METOCLOPRAMIDE 10 MG: 5 INJECTION, SOLUTION INTRAMUSCULAR; INTRAVENOUS at 11:30

## 2019-11-19 RX ADMIN — KETOROLAC TROMETHAMINE 15 MG: 30 INJECTION, SOLUTION INTRAMUSCULAR at 11:30

## 2019-11-19 RX ADMIN — SODIUM CHLORIDE 1000 ML: 0.9 INJECTION, SOLUTION INTRAVENOUS at 11:32

## 2019-11-19 NOTE — ED PROVIDER NOTES
History  Chief Complaint   Patient presents with    Headache - Recurrent or Known Dx Migraines     migraine for 5 days  patient with hx of pituitary tumor      39 YO female with Hx of migraines presents with similar  Pt states this has been present for the last 5 days, constant  It is most prominent in the frontal head, dull, associated with nausea  Pt states occasional dizziness associated with this  States this is similar in quality, location and associated symptoms to previous  She does note this headache did not improve with naproxen, which her usual headaches do  Pt does have a neurologist but states she has not followed up with them in some time, additionally notes she was prescribed other medications but has not taken these in some time and does not recall what they area  Pt denies CP/SOB/F/C/D/C, no dysuria, burning on urination or blood in urine  History provided by:  Patient   used: No    Headache - Recurrent or Known Dx Migraines   Pain location:  Frontal  Quality:  Dull  Radiates to:  Does not radiate  Pain severity now: Moderate  Onset quality:  Gradual  Duration:  5 days  Timing:  Constant  Progression:  Waxing and waning  Chronicity:  Recurrent  Similar to prior headaches: yes    Context: bright light    Relieved by:  Nothing  Worsened by:  Light  Ineffective treatments:  NSAIDs  Associated symptoms: dizziness and nausea    Associated symptoms: no abdominal pain, no blurred vision, no congestion, no diarrhea, no fatigue, no fever, no vomiting and no weakness    Dizziness:     Severity:  Mild    Duration:  5 days    Timing:  Intermittent    Progression:  Waxing and waning  Nausea:     Severity:  Moderate    Onset quality:  Gradual    Duration:  5 days    Timing:  Intermittent    Progression:  Waxing and waning      Prior to Admission Medications   Prescriptions Last Dose Informant Patient Reported? Taking?    QUEtiapine Fumarate (SEROQUEL PO)   Yes No   Sig: Take 800 mg by mouth daily at bedtime  clonazePAM (KlonoPIN) 2 mg tablet   Yes No   Sig: Take 2 mg by mouth 2 (two) times a day as needed for seizures  estradiol (ESTRACE) 1 mg tablet   Yes No   Sig: Take 2 mg by mouth   zolpidem (AMBIEN) 10 mg tablet   Yes No   Sig: Take 10 mg by mouth daily at bedtime      Facility-Administered Medications: None       Past Medical History:   Diagnosis Date    Asthma     Migraine     Pituitary tumor     Psychiatric disorder     Depression       Past Surgical History:   Procedure Laterality Date    HYSTERECTOMY         History reviewed  No pertinent family history  I have reviewed and agree with the history as documented  Social History     Tobacco Use    Smoking status: Never Smoker    Smokeless tobacco: Never Used   Substance Use Topics    Alcohol use: No    Drug use: No        Review of Systems   Constitutional: Negative for chills, fatigue and fever  HENT: Negative for congestion and dental problem  Eyes: Negative for blurred vision and visual disturbance  Respiratory: Negative for shortness of breath  Cardiovascular: Negative for chest pain  Gastrointestinal: Positive for nausea  Negative for abdominal pain, diarrhea and vomiting  Genitourinary: Negative for dysuria and frequency  Musculoskeletal: Negative for arthralgias  Skin: Negative for rash  Neurological: Positive for dizziness  Negative for weakness and light-headedness  Psychiatric/Behavioral: Negative for agitation, behavioral problems and confusion  All other systems reviewed and are negative  Physical Exam  Physical Exam   Constitutional: She is oriented to person, place, and time  She appears well-developed and well-nourished  HENT:   Head: Normocephalic and atraumatic  Eyes: Pupils are equal, round, and reactive to light  EOM are normal    Neck: Normal range of motion  Cardiovascular: Normal rate, regular rhythm and normal heart sounds     Pulmonary/Chest: Effort normal and breath sounds normal    Abdominal: Soft  Musculoskeletal: Normal range of motion  Neurological: She is alert and oriented to person, place, and time  Skin: Skin is warm and dry  Psychiatric: She has a normal mood and affect  Her behavior is normal  Thought content normal    Nursing note and vitals reviewed  Vital Signs  ED Triage Vitals [11/19/19 1000]   Temperature Pulse Respirations Blood Pressure SpO2   98 °F (36 7 °C) 85 16 109/68 98 %      Temp Source Heart Rate Source Patient Position - Orthostatic VS BP Location FiO2 (%)   Oral Monitor Sitting Right arm --      Pain Score       9           Vitals:    11/19/19 1000 11/19/19 1215   BP: 109/68 100/60   Pulse: 85 70   Patient Position - Orthostatic VS: Sitting Lying         Visual Acuity  Visual Acuity      Most Recent Value   L Pupil Size (mm)  2   R Pupil Size (mm)  2          ED Medications  Medications   sodium chloride 0 9 % bolus 1,000 mL (0 mL Intravenous Stopped 11/19/19 1242)   metoclopramide (REGLAN) injection 10 mg (10 mg Intravenous Given 11/19/19 1130)   ketorolac (TORADOL) injection 15 mg (15 mg Intravenous Given 11/19/19 1130)   diphenhydrAMINE (BENADRYL) injection 25 mg (25 mg Intravenous Given 11/19/19 1130)       Diagnostic Studies  Results Reviewed     None                 No orders to display              Procedures  Procedures       ED Course  ED Course as of Nov 20 1153   Tue Nov 19, 2019   1244 Pt currently feeling better  MDM  Number of Diagnoses or Management Options  Headache: new and requires workup  Diagnosis management comments: 1  Headache - Pt with Hx of similar, states this has been present despite taking NSAIDs, otherwise it is similar to previous  Will give fluids, Reglan, Toradol, Benadryl         Amount and/or Complexity of Data Reviewed  Review and summarize past medical records: yes    Patient Progress  Patient progress: improved      Disposition  Final diagnoses:   Headache Time reflects when diagnosis was documented in both MDM as applicable and the Disposition within this note     Time User Action Codes Description Comment    11/19/2019 12:44 PM Henson, [address] [facility] Headache       ED Disposition     ED Disposition Condition Date/Time Comment    Discharge Stable Tue Nov 19, 2019 12:44 PM Dipika Ryder discharge to home/self care  Follow-up Information     Follow up With Specialties Details Why Contact Info    Yue Kurtz MD Neurology   [address] [place] [address] [address] [facility]  [phone]            Discharge Medication List as of 11/19/2019 12:46 PM      START taking these medications    Details   metoclopramide (REGLAN) 10 mg tablet Take 1 tablet (10 mg total) by mouth every 6 (six) hours, Starting Tue 11/19/2019, Print         CONTINUE these medications which have NOT CHANGED    Details   clonazePAM (KlonoPIN) 2 mg tablet Take 2 mg by mouth 2 (two) times a day as needed for seizures  , Until Discontinued, Historical Med      estradiol (ESTRACE) 1 mg tablet Take 2 mg by mouth, Starting Wed 4/15/2015, Historical Med      QUEtiapine Fumarate (SEROQUEL PO) Take 800 mg by mouth daily at bedtime  , Until Discontinued, Historical Med      zolpidem (AMBIEN) 10 mg tablet Take 10 mg by mouth daily at bedtime, Starting Fri 4/6/2018, Historical Med           No discharge procedures on file      ED Provider  Electronically Signed by           Iris Bahena MD  11/20/19 9886

## 2019-11-19 NOTE — DISCHARGE INSTRUCTIONS
Continue to take the naproxen twice daily  You can try the Reglan if your headache returns  The number for a neurologist is included in these discharge instructions, call to be seen in the office for further evaluation and management of your recurrent headaches

## 2019-12-21 ENCOUNTER — HOSPITAL ENCOUNTER (EMERGENCY)
Facility: HOSPITAL | Age: 44
Discharge: HOME | End: 2019-12-21
Attending: EMERGENCY MEDICINE | Admitting: EMERGENCY MEDICINE
Payer: COMMERCIAL

## 2019-12-21 VITALS
SYSTOLIC BLOOD PRESSURE: 120 MMHG | TEMPERATURE: 98.5 F | RESPIRATION RATE: 16 BRPM | OXYGEN SATURATION: 98 % | DIASTOLIC BLOOD PRESSURE: 57 MMHG | WEIGHT: 168.43 LBS | HEART RATE: 78 BPM

## 2019-12-21 DIAGNOSIS — R31.9 HEMATURIA: ICD-10-CM

## 2019-12-21 DIAGNOSIS — N30.90 CYSTITIS: Primary | ICD-10-CM

## 2019-12-21 LAB
BACTERIA UR QL AUTO: ABNORMAL /HPF
BILIRUB UR QL STRIP: ABNORMAL
CLARITY UR: ABNORMAL
COLOR UR: ABNORMAL
EXT PREG TEST URINE: NEGATIVE
EXT. CONTROL ED NAV: NORMAL
GLUCOSE UR STRIP-MCNC: NEGATIVE MG/DL
HGB UR QL STRIP.AUTO: ABNORMAL
KETONES UR STRIP-MCNC: ABNORMAL MG/DL
LEUKOCYTE ESTERASE UR QL STRIP: ABNORMAL
NITRITE UR QL STRIP: POSITIVE
NON-SQ EPI CELLS URNS QL MICRO: ABNORMAL /HPF
OTHER STN SPEC: ABNORMAL
PH UR STRIP.AUTO: 7 [PH]
PROT UR STRIP-MCNC: ABNORMAL MG/DL
RBC #/AREA URNS AUTO: ABNORMAL /HPF
SP GR UR STRIP.AUTO: 1.02 (ref 1–1.03)
UROBILINOGEN UR QL STRIP.AUTO: 1 E.U./DL
WBC #/AREA URNS AUTO: ABNORMAL /HPF

## 2019-12-21 PROCEDURE — 87086 URINE CULTURE/COLONY COUNT: CPT | Performed by: EMERGENCY MEDICINE

## 2019-12-21 PROCEDURE — 99284 EMERGENCY DEPT VISIT MOD MDM: CPT | Performed by: EMERGENCY MEDICINE

## 2019-12-21 PROCEDURE — 81001 URINALYSIS AUTO W/SCOPE: CPT | Performed by: EMERGENCY MEDICINE

## 2019-12-21 PROCEDURE — 99284 EMERGENCY DEPT VISIT MOD MDM: CPT

## 2019-12-21 PROCEDURE — 81025 URINE PREGNANCY TEST: CPT | Performed by: EMERGENCY MEDICINE

## 2019-12-21 RX ORDER — PHENAZOPYRIDINE HYDROCHLORIDE 200 MG/1
200 TABLET, FILM COATED ORAL 3 TIMES DAILY
Qty: 6 TABLET | Refills: 0 | Status: SHIPPED | OUTPATIENT
Start: 2019-12-21 | End: 2020-11-06

## 2019-12-21 RX ORDER — CEPHALEXIN 500 MG/1
500 CAPSULE ORAL 2 TIMES DAILY
Qty: 14 CAPSULE | Refills: 0 | Status: SHIPPED | OUTPATIENT
Start: 2019-12-21 | End: 2019-12-28

## 2019-12-21 NOTE — ED PROVIDER NOTES
History  Chief Complaint   Patient presents with    Abdominal Pain     Pt reports vaginal bleeding, blood in urine, and abdominal pain since yesterday  Reports pelvic pressure  Pt is s/p total hysterectomy x 22 years ago  History provided by:  Patient  Abdominal Pain   Pain location:  Suprapubic  Pain quality: cramping    Pain radiates to:  Back  Pain severity:  Moderate  Onset quality:  Gradual  Duration:  1 day  Timing:  Constant  Progression:  Waxing and waning  Chronicity:  New  Context: previous surgery (JENNA/BSO)    Associated symptoms: hematuria and vaginal bleeding (possible, she passed clot while urinating, and urine was deeply red, she's not entirely clear where it originated--she has some h/o vagina bleeding followed up by her GYN after the hysterectomy)    Associated symptoms: no chest pain, no chills, no cough, no diarrhea, no dysuria, no fever, no nausea, no shortness of breath, no sore throat and no vomiting        Prior to Admission Medications   Prescriptions Last Dose Informant Patient Reported? Taking? QUEtiapine Fumarate (SEROQUEL PO)   Yes Yes   Sig: Take 800 mg by mouth daily at bedtime  clonazePAM (KlonoPIN) 2 mg tablet   Yes Yes   Sig: Take 2 mg by mouth 2 (two) times a day as needed for seizures  estradiol (ESTRACE) 1 mg tablet   Yes No   Sig: Take 2 mg by mouth   metoclopramide (REGLAN) 10 mg tablet   No No   Sig: Take 1 tablet (10 mg total) by mouth every 6 (six) hours   zolpidem (AMBIEN) 10 mg tablet   Yes Yes   Sig: Take 10 mg by mouth daily at bedtime      Facility-Administered Medications: None       Past Medical History:   Diagnosis Date    Asthma     Migraine     Pituitary tumor     Psychiatric disorder     Depression       Past Surgical History:   Procedure Laterality Date    HYSTERECTOMY         History reviewed  No pertinent family history  I have reviewed and agree with the history as documented      Social History     Tobacco Use    Smoking status: Never Smoker    Smokeless tobacco: Never Used   Substance Use Topics    Alcohol use: No    Drug use: No        Review of Systems   Constitutional: Negative for appetite change, chills and fever  HENT: Negative for sore throat  Respiratory: Negative for cough, shortness of breath and wheezing  Cardiovascular: Negative for chest pain and palpitations  Gastrointestinal: Positive for abdominal pain  Negative for diarrhea, nausea and vomiting  Genitourinary: Positive for hematuria and vaginal bleeding (possible, she passed clot while urinating, and urine was deeply red, she's not entirely clear where it originated--she has some h/o vagina bleeding followed up by her GYN after the hysterectomy)  Negative for dysuria  Musculoskeletal: Negative for neck pain  Skin: Negative for rash  Neurological: Negative for dizziness, weakness and headaches  Psychiatric/Behavioral: Negative for suicidal ideas  All other systems reviewed and are negative  Physical Exam  Physical Exam   Constitutional: She is oriented to person, place, and time  She appears well-developed and well-nourished  No distress  HENT:   Head: Normocephalic and atraumatic  Right Ear: External ear normal    Left Ear: External ear normal    Nose: Nose normal    Eyes: Pupils are equal, round, and reactive to light  Conjunctivae and EOM are normal    Neck: Normal range of motion  Neck supple  Cardiovascular: Normal rate and regular rhythm  Pulmonary/Chest: Effort normal    Abdominal: Soft  There is tenderness in the suprapubic area  Genitourinary: Pelvic exam was performed with patient supine  No bleeding in the vagina  No vaginal discharge found  Musculoskeletal: Normal range of motion  Neurological: She is alert and oriented to person, place, and time  She has normal strength  Gait normal    Skin: Skin is warm and dry  No rash noted  She is not diaphoretic  Psychiatric: She has a normal mood and affect   Her behavior is normal  Judgment and thought content normal    Nursing note and vitals reviewed  Vital Signs  ED Triage Vitals [12/21/19 1532]   Temperature Pulse Respirations Blood Pressure SpO2   98 5 °F (36 9 °C) 78 16 120/57 98 %      Temp Source Heart Rate Source Patient Position - Orthostatic VS BP Location FiO2 (%)   Temporal Monitor Sitting Right arm --      Pain Score       Worst Possible Pain           Vitals:    12/21/19 1532   BP: 120/57   Pulse: 78   Patient Position - Orthostatic VS: Sitting         Visual Acuity      ED Medications  Medications - No data to display    Diagnostic Studies  Results Reviewed     Procedure Component Value Units Date/Time    Urine Microscopic [[id number]]  (Abnormal) Collected:  12/21/19 1544    Lab Status:  Final result Specimen:  Urine, Clean Catch Updated:  12/21/19 1621     RBC, UA Innumerable /hpf      WBC, UA       Field obscured, unable to enumerate     /hpf     Epithelial Cells       Field obscured, unable to enumerate     /hpf     Bacteria, UA       Field obscured, unable to enumerate     /hpf     OTHER OBSERVATIONS Renal Epithelial Cells Present    Urine culture [[id number]] Collected:  12/21/19 1544    Lab Status:   In process Specimen:  Urine, Clean Catch Updated:  12/21/19 1621    POCT pregnancy, urine [[id number]]  (Normal) Resulted:  12/21/19 1601    Lab Status:  Final result Updated:  12/21/19 1601     EXT PREG TEST UR (Ref: Negative) NEGATIVE     Control VALID    UA w Reflex to Microscopic w Reflex to Culture [[id number]]  (Abnormal) Collected:  12/21/19 1544    Lab Status:  Final result Specimen:  Urine, Clean Catch Updated:  12/21/19 1551     Color, UA Red     Clarity, UA Cloudy     Specific [place], UA 1 020     pH, UA 7 0     Leukocytes, UA Moderate     Nitrite, UA Positive     Protein,  (2+) mg/dl      Glucose, UA Negative mg/dl      Ketones, UA Trace mg/dl      Urobilinogen, UA 1 0 E U /dl      Bilirubin, UA Interference- unable to analyze     Blood, UA Large No orders to display              Procedures  Procedures         ED Course                               MDM  Number of Diagnoses or Management Options  Cystitis:   Hematuria:   Diagnosis management comments: Urine she collected has been variably hematuric, and I suspect it is hemorrhagic cystitis without a vaginal component  She is otherwise nontoxic, so I starting with abx and she will followup  Disposition  Final diagnoses:   Cystitis   Hematuria     Time reflects when diagnosis was documented in both MDM as applicable and the Disposition within this note     Time User Action Codes Description Comment    12/21/2019  4:31 PM Manasa Draper Add [N30 90] Cystitis     12/21/2019  4:31 PM Manasa Draper Add [R31 9] Hematuria       ED Disposition     ED Disposition Condition Date/Time Comment    Discharge Stable Sat Dec 21, 2019  4:31 PM Tegan Wilhelm discharge to home/self care  Follow-up Information     Follow up With Specialties Details Why [facility] Go to  For followup Boogie Rodriguez [id number] [phone]      Gynecologist  Call  For followup           Discharge Medication List as of 12/21/2019  4:33 PM      START taking these medications    Details   cephalexin (KEFLEX) 500 mg capsule Take 1 capsule (500 mg total) by mouth 2 (two) times a day for 7 days, Starting Sat 12/21/2019, Until Sat 12/28/2019, Print      phenazopyridine (PYRIDIUM) 200 mg tablet Take 1 tablet (200 mg total) by mouth 3 (three) times a day, Starting Sat 12/21/2019, Print         CONTINUE these medications which have NOT CHANGED    Details   clonazePAM (KlonoPIN) 2 mg tablet Take 2 mg by mouth 2 (two) times a day as needed for seizures  , Until Discontinued, Historical Med      QUEtiapine Fumarate (SEROQUEL PO) Take 800 mg by mouth daily at bedtime  , Until Discontinued, Historical Med      zolpidem (AMBIEN) 10 mg tablet Take 10 mg by mouth daily at bedtime, Starting Fri 4/6/2018, Historical Med      estradiol (ESTRACE) 1 mg tablet Take 2 mg by mouth, Starting Wed 4/15/2015, Historical Med      metoclopramide (REGLAN) 10 mg tablet Take 1 tablet (10 mg total) by mouth every 6 (six) hours, Starting Tue 11/19/2019, Print           No discharge procedures on file      ED Provider  Electronically Signed by           Mylene Harrell MD  12/21/19 4425

## 2019-12-21 NOTE — DISCHARGE INSTRUCTIONS
Infección del tracto urinario en mujeres  LO QUE NECESITAS SABER:  Lisa infección del tracto urinario (ITU) es causada por bacterias que entran en quinn tracto urinario  La mayoría de las bacterias que entran en quinn tracto urinario salen cuando orina  Si las bacterias permanecen en el tracto urinario, puede contraer Pitney Zelda  Quinn tracto urinario incluye hui riñones, uréteres, vejiga y Gondregnies  La orina se [employer] riñones y fluye desde los uréteres hasta la vejiga  La orina sale de la vejiga a través de la uretra  Lisa ITU es más común en el tracto urinario inferior, que incluye la vejiga y la uretra  INSTRUCCIONES DE DESCARGA:  Regrese al departamento de emergencias si:  Usted está orinando muy poco o nada  Tiene fiebre scotty con escalofríos  Usted tiene un dolor en el costado o en la espalda que CAROLESDTYRA  Comuníquese con quinn proveedor de atención médica si:  Tienes fiebre  No te sientes mejor después de 2 días de bruce antibióticos  Estás vomitando  Tiene preguntas o inquietudes sobre quinn condición o cuidado

## 2019-12-22 LAB — BACTERIA UR CULT: NORMAL

## 2020-03-16 ENCOUNTER — TRANSCRIBE ORDERS (OUTPATIENT)
Dept: ADMINISTRATIVE | Facility: HOSPITAL | Age: 45
End: 2020-03-16

## 2020-03-16 DIAGNOSIS — Z12.31 ENCOUNTER FOR SCREENING MAMMOGRAM FOR MALIGNANT NEOPLASM OF BREAST: Primary | ICD-10-CM

## 2020-03-18 ENCOUNTER — HOSPITAL ENCOUNTER (OUTPATIENT)
Dept: MAMMOGRAPHY | Facility: CLINIC | Age: 45
Discharge: HOME | End: 2020-03-18
Payer: COMMERCIAL

## 2020-03-18 VITALS — HEIGHT: 62 IN | BODY MASS INDEX: 30.91 KG/M2 | WEIGHT: 168 LBS

## 2020-03-18 DIAGNOSIS — Z12.31 ENCOUNTER FOR SCREENING MAMMOGRAM FOR MALIGNANT NEOPLASM OF BREAST: ICD-10-CM

## 2020-03-18 PROCEDURE — 77063 BREAST TOMOSYNTHESIS BI: CPT

## 2020-03-18 PROCEDURE — 77067 SCR MAMMO BI INCL CAD: CPT

## 2020-07-11 ENCOUNTER — HOSPITAL ENCOUNTER (EMERGENCY)
Facility: HOSPITAL | Age: 45
Discharge: HOME | End: 2020-07-11
Attending: EMERGENCY MEDICINE | Admitting: EMERGENCY MEDICINE
Payer: COMMERCIAL

## 2020-07-11 VITALS
OXYGEN SATURATION: 98 % | RESPIRATION RATE: 16 BRPM | BODY MASS INDEX: 27.98 KG/M2 | HEART RATE: 87 BPM | DIASTOLIC BLOOD PRESSURE: 73 MMHG | WEIGHT: 153 LBS | SYSTOLIC BLOOD PRESSURE: 126 MMHG | TEMPERATURE: 97.7 F

## 2020-07-11 DIAGNOSIS — N39.0 URINARY TRACT INFECTION: Primary | ICD-10-CM

## 2020-07-11 LAB
BACTERIA UR QL AUTO: ABNORMAL /HPF
BILIRUB UR QL STRIP: NEGATIVE
CLARITY UR: ABNORMAL
COLOR UR: ABNORMAL
GLUCOSE UR STRIP-MCNC: NEGATIVE MG/DL
HGB UR QL STRIP.AUTO: ABNORMAL
KETONES UR STRIP-MCNC: NEGATIVE MG/DL
LEUKOCYTE ESTERASE UR QL STRIP: ABNORMAL
NITRITE UR QL STRIP: NEGATIVE
NON-SQ EPI CELLS URNS QL MICRO: ABNORMAL /HPF
PH UR STRIP.AUTO: 6 [PH] (ref 4.5–8)
PROT UR STRIP-MCNC: >=300 MG/DL
RBC #/AREA URNS AUTO: ABNORMAL /HPF
SP GR UR STRIP.AUTO: >=1.03 (ref 1–1.03)
UROBILINOGEN UR QL STRIP.AUTO: 0.2 E.U./DL
WBC #/AREA URNS AUTO: ABNORMAL /HPF

## 2020-07-11 PROCEDURE — 99284 EMERGENCY DEPT VISIT MOD MDM: CPT

## 2020-07-11 PROCEDURE — 87077 CULTURE AEROBIC IDENTIFY: CPT

## 2020-07-11 PROCEDURE — 81001 URINALYSIS AUTO W/SCOPE: CPT

## 2020-07-11 PROCEDURE — 87086 URINE CULTURE/COLONY COUNT: CPT

## 2020-07-11 PROCEDURE — 99283 EMERGENCY DEPT VISIT LOW MDM: CPT | Performed by: EMERGENCY MEDICINE

## 2020-07-11 PROCEDURE — 87186 SC STD MICRODIL/AGAR DIL: CPT

## 2020-07-11 RX ORDER — SULFAMETHOXAZOLE AND TRIMETHOPRIM 800; 160 MG/1; MG/1
1 TABLET ORAL ONCE
Status: COMPLETED | OUTPATIENT
Start: 2020-07-11 | End: 2020-07-11

## 2020-07-11 RX ORDER — SULFAMETHOXAZOLE AND TRIMETHOPRIM 800; 160 MG/1; MG/1
1 TABLET ORAL 2 TIMES DAILY
Qty: 10 TABLET | Refills: 0 | Status: SHIPPED | OUTPATIENT
Start: 2020-07-11 | End: 2020-07-16

## 2020-07-11 RX ADMIN — SULFAMETHOXAZOLE AND TRIMETHOPRIM 1 TABLET: 800; 160 TABLET ORAL at 20:22

## 2020-07-12 NOTE — ED PROVIDER NOTES
History  Chief Complaint   Patient presents with    Abdominal Pain     Lower abdominal discomfort 3 days, dysuria, blood in urine  No known fever  40 YO female presents with lower abdominal discomfort and dysuria for the last 3 days  Pt states discomfort has been constant, non-radiating, no known exacerbating or alleviating factors  Pt states she has increased pressure with urination and has noticed some blood in urine  She denies fevers, no back pain and denies nausea  States symptoms are similar to previous urinary tract infections  Pt denies CP/SOB/F/C/N/V/D/C  History provided by:  Patient and significant other   used: No    Abdominal Pain   Pain location:  Suprapubic  Pain quality: aching and pressure    Pain radiates to:  Does not radiate  Pain severity:  Moderate  Onset quality:  Gradual  Duration:  3 days  Timing:  Constant  Progression:  Unchanged  Chronicity:  New  Relieved by:  Nothing  Associated symptoms: dysuria    Associated symptoms: no chest pain, no chills, no diarrhea, no fatigue, no fever, no shortness of breath and no vomiting        Prior to Admission Medications   Prescriptions Last Dose Informant Patient Reported? Taking? QUEtiapine Fumarate (SEROQUEL PO)   Yes No   Sig: Take 800 mg by mouth daily at bedtime  clonazePAM (KlonoPIN) 2 mg tablet   Yes No   Sig: Take 2 mg by mouth 2 (two) times a day as needed for seizures     estradiol (ESTRACE) 1 mg tablet   Yes No   Sig: Take 2 mg by mouth   metoclopramide (REGLAN) 10 mg tablet   No No   Sig: Take 1 tablet (10 mg total) by mouth every 6 (six) hours   phenazopyridine (PYRIDIUM) 200 mg tablet   No No   Sig: Take 1 tablet (200 mg total) by mouth 3 (three) times a day   zolpidem (AMBIEN) 10 mg tablet   Yes No   Sig: Take 10 mg by mouth daily at bedtime      Facility-Administered Medications: None       Past Medical History:   Diagnosis Date    Asthma     Migraine     Pituitary tumor     Psychiatric disorder Depression       Past Surgical History:   Procedure Laterality Date    HYSTERECTOMY         Family History   Problem Relation Age of Onset    No Known Problems Mother     Lymphoma Sister     No Known Problems Maternal Grandmother     No Known Problems Paternal Grandmother     No Known Problems Maternal Aunt     No Known Problems Maternal Aunt      I have reviewed and agree with the history as documented  E-Cigarette/Vaping     E-Cigarette/Vaping Substances     Social History     Tobacco Use    Smoking status: Never Smoker    Smokeless tobacco: Never Used   Substance Use Topics    Alcohol use: No    Drug use: No       Review of Systems   Constitutional: Negative for chills, fatigue and fever  HENT: Negative for dental problem  Eyes: Negative for visual disturbance  Respiratory: Negative for shortness of breath  Cardiovascular: Negative for chest pain  Gastrointestinal: Positive for abdominal pain  Negative for diarrhea and vomiting  Genitourinary: Positive for dysuria  Negative for frequency  Musculoskeletal: Negative for arthralgias  Skin: Negative for rash  Neurological: Negative for dizziness, weakness and light-headedness  Psychiatric/Behavioral: Negative for agitation, behavioral problems and confusion  All other systems reviewed and are negative  Physical Exam  Physical Exam   Constitutional: She is oriented to person, place, and time  She appears well-developed and well-nourished  HENT:   Head: Normocephalic and atraumatic  Eyes: EOM are normal    Neck: Normal range of motion  Cardiovascular: Normal rate, regular rhythm and normal heart sounds  Pulmonary/Chest: Effort normal and breath sounds normal    Abdominal: Soft  There is no tenderness  Musculoskeletal: Normal range of motion  Neurological: She is alert and oriented to person, place, and time  Skin: Skin is warm and dry  Psychiatric: She has a normal mood and affect   Her behavior is normal  Thought content normal    Nursing note and vitals reviewed  Vital Signs  ED Triage Vitals [07/11/20 1918]   Temperature Pulse Respirations Blood Pressure SpO2   97 7 °F (36 5 °C) 87 16 126/73 98 %      Temp Source Heart Rate Source Patient Position - Orthostatic VS BP Location FiO2 (%)   Temporal Monitor Sitting Right arm --      Pain Score       Worst Possible Pain           Vitals:    07/11/20 1918   BP: 126/73   Pulse: 87   Patient Position - Orthostatic VS: Sitting         Visual Acuity      ED Medications  Medications   sulfamethoxazole-trimethoprim (BACTRIM DS) 800-160 mg per tablet 1 tablet (1 tablet Oral Given 7/11/20 2022)       Diagnostic Studies  Results Reviewed     Procedure Component Value Units Date/Time    Urine Microscopic [[id number]]  (Abnormal) Collected:  07/11/20 1936    Lab Status:  Final result Specimen:  Urine, Other Updated:  07/11/20 2014     RBC, UA       Field obscured, unable to enumerate     /hpf     WBC, UA Innumerable /hpf      Epithelial Cells       Field obscured, unable to enumerate     /hpf     Bacteria, UA       Field obscured, unable to enumerate     /hpf    Urine culture [[id number]] Collected:  07/11/20 1936    Lab Status:   In process Specimen:  Urine, Other Updated:  07/11/20 2013    POCT urinalysis dipstick [[id number]]  (Abnormal) Resulted:  07/11/20 1938    Lab Status:  Final result Specimen:  Urine Updated:  07/11/20 1938    Urine Macroscopic, POC [[id number]]  (Abnormal) Collected:  07/11/20 1936    Lab Status:  Final result Specimen:  Urine Updated:  07/11/20 1937     Color, UA Gauri     Clarity, UA Turbid     pH, UA 6 0     Leukocytes, UA Trace     Nitrite, UA Negative     Protein, UA >=300 mg/dl      Glucose, UA Negative mg/dl      Ketones, UA Negative mg/dl      Urobilinogen, UA 0 2 E U /dl      Bilirubin, UA Negative     Blood, UA Large     Specific Gravity, UA >=1 030    Narrative:       CLINITEK RESULT                 No orders to display Procedures  Procedures         ED Course                                             MDM  Number of Diagnoses or Management Options  Urinary tract infection: new and requires workup  Diagnosis management comments: 1  Dysuria - Pt with symptoms consistent with uncomplicated UTI, urine dip shows + leukocytes  Will treat with Abx  Amount and/or Complexity of Data Reviewed  Clinical lab tests: ordered and reviewed    Patient Progress  Patient progress: stable        Disposition  Final diagnoses:   Urinary tract infection     Time reflects when diagnosis was documented in both MDM as applicable and the Disposition within this note     Time User Action Codes Description Comment    7/11/2020  7:59 PM Antoinette Apley E Add [N39 0] Urinary tract infection       ED Disposition     ED Disposition Condition Date/Time Comment    Discharge Stable Sat Jul 11, 2020  7:58 PM Ary Matter discharge to home/self care  Follow-up Information     Follow up With Specialties Details [facility]   [address] [place] [id number] [phone]            Discharge Medication List as of 7/11/2020  8:00 PM      START taking these medications    Details   sulfamethoxazole-trimethoprim (BACTRIM DS) 800-160 mg per tablet Take 1 tablet by mouth 2 (two) times a day for 5 days smx-tmp DS (BACTRIM) 800-160 mg tabs (1tab q12 D10), Starting Sat 7/11/2020, Until Thu 7/16/2020, Normal         CONTINUE these medications which have NOT CHANGED    Details   clonazePAM (KlonoPIN) 2 mg tablet Take 2 mg by mouth 2 (two) times a day as needed for seizures  , Until Discontinued, Historical Med      estradiol (ESTRACE) 1 mg tablet Take 2 mg by mouth, Starting Wed 4/15/2015, Historical Med      metoclopramide (REGLAN) 10 mg tablet Take 1 tablet (10 mg total) by mouth every 6 (six) hours, Starting Tue 11/19/2019, Print      phenazopyridine (PYRIDIUM) 200 mg tablet Take 1 tablet (200 mg total) by mouth 3 (three) times a day, Starting Sat 12/21/2019, Print      QUEtiapine Fumarate (SEROQUEL PO) Take 800 mg by mouth daily at bedtime  , Until Discontinued, Historical Med      zolpidem (AMBIEN) 10 mg tablet Take 10 mg by mouth daily at bedtime, Starting Fri 4/6/2018, Historical Med           No discharge procedures on file      PDMP Review     None          ED Provider  Electronically Signed by           John Davis MD  07/12/20 1959

## 2020-07-14 LAB — BACTERIA UR CULT: ABNORMAL

## 2020-11-06 ENCOUNTER — HOSPITAL ENCOUNTER (EMERGENCY)
Facility: HOSPITAL | Age: 45
Discharge: HOME | End: 2020-11-06
Attending: EMERGENCY MEDICINE | Admitting: EMERGENCY MEDICINE
Payer: COMMERCIAL

## 2020-11-06 VITALS
SYSTOLIC BLOOD PRESSURE: 113 MMHG | HEART RATE: 84 BPM | TEMPERATURE: 98.3 F | RESPIRATION RATE: 20 BRPM | OXYGEN SATURATION: 100 % | DIASTOLIC BLOOD PRESSURE: 73 MMHG | WEIGHT: 161.16 LBS | BODY MASS INDEX: 29.48 KG/M2

## 2020-11-06 DIAGNOSIS — G43.809 OTHER MIGRAINE WITHOUT STATUS MIGRAINOSUS, NOT INTRACTABLE: Primary | ICD-10-CM

## 2020-11-06 PROCEDURE — 96361 HYDRATE IV INFUSION ADD-ON: CPT

## 2020-11-06 PROCEDURE — 99284 EMERGENCY DEPT VISIT MOD MDM: CPT | Performed by: EMERGENCY MEDICINE

## 2020-11-06 PROCEDURE — 99283 EMERGENCY DEPT VISIT LOW MDM: CPT

## 2020-11-06 PROCEDURE — 96365 THER/PROPH/DIAG IV INF INIT: CPT

## 2020-11-06 PROCEDURE — 96375 TX/PRO/DX INJ NEW DRUG ADDON: CPT

## 2020-11-06 RX ORDER — VALPROATE SODIUM 100 MG/ML
1000 INJECTION, SOLUTION INTRAVENOUS ONCE
Status: DISCONTINUED | OUTPATIENT
Start: 2020-11-06 | End: 2020-11-06 | Stop reason: SDUPTHER

## 2020-11-06 RX ORDER — BUTALBITAL, ACETAMINOPHEN AND CAFFEINE 50; 325; 40 MG/1; MG/1; MG/1
1 TABLET ORAL EVERY 4 HOURS PRN
Qty: 15 TABLET | Refills: 0 | Status: SHIPPED | OUTPATIENT
Start: 2020-11-06 | End: 2022-05-04

## 2020-11-06 RX ORDER — DIPHENHYDRAMINE HYDROCHLORIDE 50 MG/ML
12.5 INJECTION INTRAMUSCULAR; INTRAVENOUS ONCE
Status: COMPLETED | OUTPATIENT
Start: 2020-11-06 | End: 2020-11-06

## 2020-11-06 RX ORDER — KETOROLAC TROMETHAMINE 30 MG/ML
15 INJECTION, SOLUTION INTRAMUSCULAR; INTRAVENOUS ONCE
Status: COMPLETED | OUTPATIENT
Start: 2020-11-06 | End: 2020-11-06

## 2020-11-06 RX ORDER — METOCLOPRAMIDE HYDROCHLORIDE 5 MG/ML
10 INJECTION INTRAMUSCULAR; INTRAVENOUS ONCE
Status: COMPLETED | OUTPATIENT
Start: 2020-11-06 | End: 2020-11-06

## 2020-11-06 RX ORDER — OLANZAPINE 5 MG/1
10 TABLET, ORALLY DISINTEGRATING ORAL ONCE
Status: COMPLETED | OUTPATIENT
Start: 2020-11-06 | End: 2020-11-06

## 2020-11-06 RX ADMIN — DIPHENHYDRAMINE HYDROCHLORIDE 12.5 MG: 50 INJECTION, SOLUTION INTRAMUSCULAR; INTRAVENOUS at 11:23

## 2020-11-06 RX ADMIN — METOCLOPRAMIDE 10 MG: 5 INJECTION, SOLUTION INTRAMUSCULAR; INTRAVENOUS at 11:28

## 2020-11-06 RX ADMIN — SODIUM CHLORIDE 1000 ML: 0.9 INJECTION, SOLUTION INTRAVENOUS at 11:27

## 2020-11-06 RX ADMIN — VALPROATE SODIUM 1000 MG: 100 INJECTION, SOLUTION INTRAVENOUS at 12:51

## 2020-11-06 RX ADMIN — OLANZAPINE 10 MG: 5 TABLET, ORALLY DISINTEGRATING ORAL at 12:19

## 2020-11-06 RX ADMIN — KETOROLAC TROMETHAMINE 15 MG: 30 INJECTION, SOLUTION INTRAMUSCULAR at 11:25

## 2021-03-02 ENCOUNTER — TRANSCRIBE ORDERS (OUTPATIENT)
Dept: ADMINISTRATIVE | Facility: HOSPITAL | Age: 46
End: 2021-03-02

## 2021-03-02 DIAGNOSIS — Z12.31 ENCOUNTER FOR SCREENING MAMMOGRAM FOR MALIGNANT NEOPLASM OF BREAST: Primary | ICD-10-CM

## 2021-05-03 ENCOUNTER — HOSPITAL ENCOUNTER (EMERGENCY)
Facility: HOSPITAL | Age: 46
Discharge: HOME | End: 2021-05-03
Attending: EMERGENCY MEDICINE | Admitting: EMERGENCY MEDICINE
Payer: COMMERCIAL

## 2021-05-03 ENCOUNTER — APPOINTMENT (EMERGENCY)
Dept: RADIOLOGY | Facility: HOSPITAL | Age: 46
End: 2021-05-03
Payer: COMMERCIAL

## 2021-05-03 ENCOUNTER — APPOINTMENT (EMERGENCY)
Dept: CT IMAGING | Facility: HOSPITAL | Age: 46
End: 2021-05-03
Payer: COMMERCIAL

## 2021-05-03 VITALS
HEART RATE: 100 BPM | TEMPERATURE: 98 F | RESPIRATION RATE: 16 BRPM | SYSTOLIC BLOOD PRESSURE: 119 MMHG | DIASTOLIC BLOOD PRESSURE: 69 MMHG | OXYGEN SATURATION: 99 %

## 2021-05-03 DIAGNOSIS — M25.512 LEFT SHOULDER PAIN: ICD-10-CM

## 2021-05-03 DIAGNOSIS — M54.2 NECK PAIN: ICD-10-CM

## 2021-05-03 DIAGNOSIS — M25.522 LEFT ELBOW PAIN: ICD-10-CM
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
## 2021-05-03 PROCEDURE — 70450 CT HEAD/BRAIN W/O DYE: CPT

## 2021-05-03 PROCEDURE — 99284 EMERGENCY DEPT VISIT MOD MDM: CPT

## 2021-05-03 PROCEDURE — 99285 EMERGENCY DEPT VISIT HI MDM: CPT | Performed by: EMERGENCY MEDICINE

## 2021-05-03 PROCEDURE — 73030 X-RAY EXAM OF SHOULDER: CPT

## 2021-05-03 PROCEDURE — 96372 THER/PROPH/DIAG INJ SC/IM: CPT

## 2021-05-03 PROCEDURE — 73070 X-RAY EXAM OF ELBOW: CPT

## 2021-05-03 PROCEDURE — 72125 CT NECK SPINE W/O DYE: CPT

## 2021-05-03 RX ORDER — NAPROXEN 500 MG/1
500 TABLET ORAL 2 TIMES DAILY WITH MEALS
Qty: 30 TABLET | Refills: 0 | Status: ON HOLD | OUTPATIENT
Start: 2021-05-03 | End: 2022-06-06 | Stop reason: CLARIF

## 2021-05-03 RX ORDER — KETOROLAC TROMETHAMINE 30 MG/ML
15 INJECTION, SOLUTION INTRAMUSCULAR; INTRAVENOUS ONCE
Status: COMPLETED | OUTPATIENT
Start: 2021-05-03 | End: 2021-05-03

## 2021-05-03 RX ADMIN — KETOROLAC TROMETHAMINE 15 MG: 30 INJECTION, SOLUTION INTRAMUSCULAR; INTRAVENOUS at 17:33
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
## 2021-05-03 NOTE — DISCHARGE INSTRUCTIONS
If you have any significant worsening of pain, confusion, change in mental status, return to the emergency department for further evaluation  Take the prescribed medication for pain control
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
## 2021-05-03 NOTE — Clinical Note
Jefe Reveles was seen and treated in our emergency department on 5/3/2021  Diagnosis:     Linda Brown    She may return on this date: If you have any questions or concerns, please don't hesitate to call        Manuel Limon MD    ______________________________           _______________          _______________  Hospital Representative                              Date                                Time

## 2021-08-16 ENCOUNTER — HOSPITAL ENCOUNTER (EMERGENCY)
Facility: HOSPITAL | Age: 46
Discharge: HOME | End: 2021-08-16
Attending: EMERGENCY MEDICINE
Payer: COMMERCIAL

## 2021-08-16 VITALS
HEART RATE: 102 BPM | TEMPERATURE: 97.4 F | OXYGEN SATURATION: 99 % | SYSTOLIC BLOOD PRESSURE: 129 MMHG | DIASTOLIC BLOOD PRESSURE: 79 MMHG | RESPIRATION RATE: 15 BRPM

## 2021-08-16 DIAGNOSIS — F31.9 BIPOLAR 1 DISORDER (HCC): ICD-10-CM

## 2021-08-16 DIAGNOSIS — F20.0 PARANOID SCHIZOPHRENIA (HCC): Primary | ICD-10-CM

## 2021-08-16 PROCEDURE — 99283 EMERGENCY DEPT VISIT LOW MDM: CPT

## 2021-08-16 PROCEDURE — 99282 EMERGENCY DEPT VISIT SF MDM: CPT | Performed by: EMERGENCY MEDICINE
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
## 2021-11-20 ENCOUNTER — HOSPITAL ENCOUNTER (EMERGENCY)
Facility: HOSPITAL | Age: 46
Discharge: HOME | End: 2021-11-20
Attending: EMERGENCY MEDICINE | Admitting: EMERGENCY MEDICINE
Payer: COMMERCIAL

## 2021-11-20 VITALS
TEMPERATURE: 98.1 F | WEIGHT: 165.57 LBS | RESPIRATION RATE: 18 BRPM | HEART RATE: 98 BPM | DIASTOLIC BLOOD PRESSURE: 75 MMHG | SYSTOLIC BLOOD PRESSURE: 112 MMHG | OXYGEN SATURATION: 98 % | BODY MASS INDEX: 30.28 KG/M2

## 2021-11-20 DIAGNOSIS — B86 SCABIES: Primary | ICD-10-CM

## 2021-11-20 PROCEDURE — 99284 EMERGENCY DEPT VISIT MOD MDM: CPT | Performed by: PHYSICIAN ASSISTANT

## 2021-11-20 PROCEDURE — 99282 EMERGENCY DEPT VISIT SF MDM: CPT

## 2021-11-20 RX ORDER — PERMETHRIN 50 MG/G
CREAM TOPICAL
Qty: 60 G | Refills: 0 | Status: ON HOLD | OUTPATIENT
Start: 2021-11-20 | End: 2022-06-06 | Stop reason: CLARIF

## 2022-05-04 ENCOUNTER — OFFICE VISIT (OUTPATIENT)
Dept: NEUROSURGERY | Facility: CLINIC | Age: 47
End: 2022-05-04
Payer: COMMERCIAL

## 2022-05-04 VITALS
SYSTOLIC BLOOD PRESSURE: 105 MMHG | DIASTOLIC BLOOD PRESSURE: 70 MMHG | TEMPERATURE: 97.8 F | HEART RATE: 97 BPM | HEIGHT: 62 IN | WEIGHT: 183 LBS | BODY MASS INDEX: 33.68 KG/M2 | OXYGEN SATURATION: 99 %

## 2022-05-04 DIAGNOSIS — E23.6 PITUITARY MASS (HCC): Primary | ICD-10-CM

## 2022-05-04 DIAGNOSIS — R93.0 ABNORMAL CT OF THE HEAD: ICD-10-CM

## 2022-05-04 PROCEDURE — 99204 OFFICE O/P NEW MOD 45 MIN: CPT | Performed by: NURSE PRACTITIONER
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
## 2022-05-04 NOTE — ASSESSMENT & PLAN NOTE
Presents as self-referral for evaluation of pituitary nodule  · This was discovered incidentally in 2008 when she had MRI brain completed as part of a workup for headaches and "eye twitching" and noted with 5 mm pituitary microadenoma  · Was told at the time that she had 'bleeding" to area and needed surgery, but then stabilized  · Has remained stable on imaging from 2013 and 2018  · Has not been evaluated by an endocrinologist or ophthalmologist   · Currently endorsing symptoms of bilateral tinnitus since September, headaches and sensitivity  · Exam non-focal      Imaging:  · CT head wo, 5/3/2021: No mass effect or midline shift  No CT signs of acute infarction  No acute parenchymal hemorrhage  Stable 0 6 cm hypodense pituitary nodule acentrically on the right unchanged from 2013  No suprasellar extension  Plan:  · Reviewed that do not think symptoms are a result of what is likely a stable pituitary microadenoma  · Discussed that will proceed with general workup for pituitary adenoma since it is unclear if this has ever been completed  · Pituitary blood work ordered  · Referral placed to ophthalmology for visual field testing  · MRI brain pituitary w/wo ordered  · Return for re-evaluation once testing has been completed  If any abnormalities in blood work, will refer to endocrine  · Patient and  Leola Friend verbalized understanding and are in agreement with plan

## 2022-05-24 ENCOUNTER — OFFICE VISIT (OUTPATIENT)
Dept: OBGYN CLINIC | Facility: MEDICAL CENTER | Age: 47
End: 2022-05-24
Payer: COMMERCIAL

## 2022-05-24 ENCOUNTER — APPOINTMENT (OUTPATIENT)
Dept: RADIOLOGY | Facility: MEDICAL CENTER | Age: 47
End: 2022-05-24
Payer: COMMERCIAL

## 2022-05-24 VITALS
SYSTOLIC BLOOD PRESSURE: 114 MMHG | WEIGHT: 188 LBS | HEIGHT: 62 IN | DIASTOLIC BLOOD PRESSURE: 78 MMHG | HEART RATE: 85 BPM | BODY MASS INDEX: 34.6 KG/M2

## 2022-05-24 DIAGNOSIS — G89.29 CHRONIC NECK PAIN: ICD-10-CM

## 2022-05-24 DIAGNOSIS — M54.50 CHRONIC BILATERAL LOW BACK PAIN WITHOUT SCIATICA: Primary | ICD-10-CM

## 2022-05-24 DIAGNOSIS — M54.2 CHRONIC NECK PAIN: ICD-10-CM

## 2022-05-24 DIAGNOSIS — M54.50 CHRONIC BILATERAL LOW BACK PAIN WITHOUT SCIATICA: ICD-10-CM

## 2022-05-24 DIAGNOSIS — G89.29 CHRONIC BILATERAL LOW BACK PAIN WITHOUT SCIATICA: Primary | ICD-10-CM

## 2022-05-24 DIAGNOSIS — G89.29 CHRONIC BILATERAL LOW BACK PAIN WITHOUT SCIATICA: ICD-10-CM

## 2022-05-24 PROCEDURE — 72040 X-RAY EXAM NECK SPINE 2-3 VW: CPT

## 2022-05-24 PROCEDURE — 99204 OFFICE O/P NEW MOD 45 MIN: CPT | Performed by: FAMILY MEDICINE

## 2022-05-24 PROCEDURE — 72100 X-RAY EXAM L-S SPINE 2/3 VWS: CPT

## 2022-05-24 RX ORDER — CYCLOBENZAPRINE HCL 5 MG
5 TABLET ORAL 3 TIMES DAILY PRN
Qty: 45 TABLET | Refills: 0 | Status: SHIPPED | OUTPATIENT
Start: 2022-05-24 | End: 2022-06-06

## 2022-05-24 RX ORDER — PREDNISONE 10 MG/1
TABLET ORAL
Qty: 28 TABLET | Refills: 0 | Status: ON HOLD | OUTPATIENT
Start: 2022-05-24 | End: 2022-06-06 | Stop reason: CLARIF

## 2022-05-24 NOTE — PROGRESS NOTES
[facility] [address] [address] [phone]      Chief Complaint:  Chief Complaint   Patient presents with    Spine - Pain       Vitals:  /78   Pulse 85   Ht 5' 2" (1 575 m)   Wt 85 3 kg (188 lb)   BMI 34 39 kg/m²     The following portions of the patient's history were reviewed and updated as appropriate: allergies, current medications, past family history, past medical history, past social history, past surgical history, and problem list       Subjective:   Patient ID: Joey Presley is a 52 y o  female  Here c/o neck and lower back pain  Pain radiating down her back  Denies n/t  Denies hx of CA/bowel/bladder changes  Taking tylenol-not helping much  Hurts to move  Pain for years- getting worse   hx of MARCOS in the past by pain mgmt- >5 yrs ago  Review of Systems   Constitutional: Negative for fatigue and fever  Respiratory: Negative for shortness of breath  Cardiovascular: Negative for chest pain  Gastrointestinal: Negative for abdominal pain and nausea  Genitourinary: Negative for dysuria  Musculoskeletal: Positive for back pain  Skin: Negative for rash and wound  Neurological: Negative for weakness and headaches  Objective:  Back Exam     Tenderness   The patient is experiencing tenderness in the lumbar and cervical     Range of Motion   The patient has normal back ROM  Muscle Strength   The patient has normal back strength  Reflexes   Patellar: normal    Comments:  C spine- pain with felx/ext/lat flex B/lat flex B  L spine- pain with felx/ext/lat flex B/lat flex B              Physical Exam  Constitutional:       Appearance: Normal appearance  She is normal weight  HENT:      Head: Normocephalic  Eyes:      Extraocular Movements: Extraocular movements intact  Pulmonary:      Effort: Pulmonary effort is normal    Musculoskeletal:      Cervical back: Normal range of motion  Skin:     General: Skin is warm and dry  Neurological:      General: No focal deficit present  Mental Status: She is alert and oriented to person, place, and time  Mental status is at baseline  Sensory: No sensory deficit  Motor: No weakness  Deep Tendon Reflexes: Reflexes normal    Psychiatric:         Mood and Affect: Mood normal          Behavior: Behavior normal          Thought Content: Thought content normal          Judgment: Judgment normal          I have personally reviewed pertinent films in PACS and my interpretation is XR L/C spine-  mild/mod DJD  no fx  Assessment/Plan:  Assessment/Plan   Diagnoses and all orders for this visit:    Chronic bilateral low back pain without sciatica  -     XR spine lumbar 2 or 3 views injury; Future  -     predniSONE 10 mg tablet; Take 7T PO x 1 day, then take 6T PO x 1 day, and continue to decrease  by 1T until finished  Quantity-28  -     cyclobenzaprine (FLEXERIL) 5 mg tablet; Take 1 tablet (5 mg total) by mouth as needed in the morning and 1 tablet (5 mg total) as needed at noon and 1 tablet (5 mg total) as needed in the evening for muscle spasms   -     Ambulatory Referral to Physical Therapy; Future    Chronic neck pain  -     XR spine cervical 2 or 3 vw injury; Future  -     predniSONE 10 mg tablet; Take 7T PO x 1 day, then take 6T PO x 1 day, and continue to decrease  by 1T until finished  Quantity-28  -     cyclobenzaprine (FLEXERIL) 5 mg tablet; Take 1 tablet (5 mg total) by mouth as needed in the morning and 1 tablet (5 mg total) as needed at noon and 1 tablet (5 mg total) as needed in the evening for muscle spasms   -     Ambulatory Referral to Physical Therapy; Future        Return in about 6 weeks (around 7/5/2022) for Recheck       Fernandez Olmstead MD

## 2022-05-24 NOTE — PATIENT INSTRUCTIONS
F/u 6 wks  Begin prednisone taper  Flexeril as needed  Avoid ibupofen, aleve, advil while taking steroids  Tylenol as needed  Icing/heat/OTC pain meds as needed  Home exercises

## 2022-05-27 ENCOUNTER — HOSPITAL ENCOUNTER (OUTPATIENT)
Dept: MRI IMAGING | Facility: HOSPITAL | Age: 47
Discharge: HOME | End: 2022-05-27
Payer: COMMERCIAL

## 2022-05-27 DIAGNOSIS — R93.0 ABNORMAL CT OF THE HEAD: ICD-10-CM

## 2022-05-27 DIAGNOSIS — E23.6 PITUITARY MASS (HCC): ICD-10-CM

## 2022-05-27 PROCEDURE — G1004 CDSM NDSC: HCPCS

## 2022-05-27 PROCEDURE — A9585 GADOBUTROL INJECTION: HCPCS | Performed by: NURSE PRACTITIONER

## 2022-05-27 PROCEDURE — 70553 MRI BRAIN STEM W/O & W/DYE: CPT

## 2022-05-27 RX ADMIN — GADOBUTROL 8 ML: 604.72 INJECTION INTRAVENOUS at 21:31

## 2022-05-31 ENCOUNTER — OFFICE VISIT (OUTPATIENT)
Dept: NEUROSURGERY | Facility: CLINIC | Age: 47
End: 2022-05-31
Payer: COMMERCIAL

## 2022-05-31 VITALS
HEART RATE: 95 BPM | TEMPERATURE: 97.3 F | OXYGEN SATURATION: 98 % | BODY MASS INDEX: 33.86 KG/M2 | WEIGHT: 184 LBS | DIASTOLIC BLOOD PRESSURE: 80 MMHG | HEIGHT: 62 IN | SYSTOLIC BLOOD PRESSURE: 110 MMHG

## 2022-05-31 DIAGNOSIS — E23.6 PITUITARY MASS (HCC): Primary | ICD-10-CM

## 2022-05-31 PROCEDURE — 99213 OFFICE O/P EST LOW 20 MIN: CPT | Performed by: PHYSICIAN ASSISTANT

## 2022-05-31 RX ORDER — DULOXETIN HYDROCHLORIDE 20 MG/1
60 CAPSULE, DELAYED RELEASE ORAL DAILY
COMMUNITY

## 2022-05-31 NOTE — ASSESSMENT & PLAN NOTE
Presents for follow up for pituitary nodule  · This was discovered incidentally in 2008 when she had MRI brain completed as part of a workup for headaches and "eye twitching" and noted with 5 mm pituitary microadenoma  · Was told at the time that she had 'bleeding" to area and needed surgery, but then stabilized  · Has remained stable on imaging from 2011, 2013 and 2018  · Currently endorsing symptoms of bilateral tinnitus since September and headaches  · Exam non-focal      Imaging:  · Mri brain pituitary w/wo 5/27/22: Pituitary lesion acentric to the right noted to be similar to prior MRI from 2011  Final read pending  · CT head wo, 5/3/2021: No mass effect or midline shift  No CT signs of acute infarction  No acute parenchymal hemorrhage  Stable 0 6 cm hypodense pituitary nodule acentrically on the right unchanged from 2013  No suprasellar extension  Plan:  · Reviewed pt that her sx such as tinnitus are likely unrelated to the pituitary lesion  Encouraged pt to f/u with ENT  · Pt reports that that she did not get a chance to get her pituitary labs done prior to the visit  She reported that she could get them done tomorrow  If patient obtains her lab work tomorrow, this provider could review the labs tomorrow  If abnormalities noted, may require referral to endocrinology  · Pt was previously provided referral to ophthalmology for visual field testing  Pt reports that she went to see Ophthalmology and reports that no abnormalities were noted  Neurosurgery MA notified to attempt to obtain the official report from Ophthalmology  · Reviewed MRI brain pituitary w wo with patient which shows stable pituitary lesion (final read pending)  No nsx intervention anticipated at this time   If no abnormalities noted on the lab work that pt plans to obtain tomorrow and given that the pituitary lesion is stable since imaging from 2011 (over 10 years), pt could follow up in 3 years with repeat MRI brain pituitary w wo    · Pt made aware to contact nsx with any questions or concerns in the interim
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
## 2022-06-03 ENCOUNTER — HOSPITAL ENCOUNTER (EMERGENCY)
Facility: HOSPITAL | Age: 47
Discharge: HOME | End: 2022-06-03
Attending: EMERGENCY MEDICINE | Admitting: EMERGENCY MEDICINE
Payer: COMMERCIAL

## 2022-06-03 VITALS
RESPIRATION RATE: 16 BRPM | TEMPERATURE: 98.2 F | DIASTOLIC BLOOD PRESSURE: 78 MMHG | OXYGEN SATURATION: 99 % | HEART RATE: 78 BPM | SYSTOLIC BLOOD PRESSURE: 117 MMHG

## 2022-06-03 DIAGNOSIS — M54.50 ACUTE EXACERBATION OF CHRONIC LOW BACK PAIN: Primary | ICD-10-CM

## 2022-06-03 DIAGNOSIS — G89.29 ACUTE EXACERBATION OF CHRONIC LOW BACK PAIN: Primary | ICD-10-CM

## 2022-06-03 PROCEDURE — 96372 THER/PROPH/DIAG INJ SC/IM: CPT

## 2022-06-03 PROCEDURE — 99283 EMERGENCY DEPT VISIT LOW MDM: CPT

## 2022-06-03 PROCEDURE — 99284 EMERGENCY DEPT VISIT MOD MDM: CPT | Performed by: EMERGENCY MEDICINE

## 2022-06-03 RX ORDER — KETOROLAC TROMETHAMINE 30 MG/ML
30 INJECTION, SOLUTION INTRAMUSCULAR; INTRAVENOUS ONCE
Status: COMPLETED | OUTPATIENT
Start: 2022-06-03 | End: 2022-06-03

## 2022-06-03 RX ORDER — LIDOCAINE 50 MG/G
1 PATCH TOPICAL DAILY
Qty: 5 PATCH | Refills: 0 | Status: SHIPPED | OUTPATIENT
Start: 2022-06-03 | End: 2022-06-20

## 2022-06-03 RX ORDER — NAPROXEN 500 MG/1
500 TABLET ORAL EVERY 12 HOURS PRN
Qty: 15 TABLET | Refills: 0 | Status: SHIPPED | OUTPATIENT
Start: 2022-06-03

## 2022-06-03 RX ADMIN — KETOROLAC TROMETHAMINE 30 MG: 30 INJECTION, SOLUTION INTRAMUSCULAR; INTRAVENOUS at 09:17

## 2022-06-03 NOTE — ED NOTES
Pt reports thoughts of passive SI in triage, reports she has never acted on or made attempts to harm self   Pt reports she does she someone for her depression     Lydia Chen RN  06/03/22 2303

## 2022-06-03 NOTE — ED PROVIDER NOTES
History  Chief Complaint   Patient presents with    Back Pain     Pt reports hx of arthritis in back  Pt reports pain wrose and unable to sleep  No meds PTA     27-year-old female with history of migraine headaches, pituitary tumor presents to the ED with worsening back pain  She states the pain involves her whole spine and sometimes makes her right thigh numb  No bowel or bladder incontinence  No trauma  She recently saw orthopedics and had a lumbar spine x-ray which showed minimal degenerative changes  She was started on prednisone and Flexeril which she states is not helping  She was given referral to physical therapy but has not yet made an appointment  History provided by:  Patient   used: Yes    Back Pain  Location:  Generalized  Quality:  Burning  Radiates to:  R thigh  Pain severity:  Severe  Pain is:  Same all the time  Onset quality:  Gradual  Duration:  1 month  Timing:  Constant  Progression:  Worsening  Chronicity:  New  Context: not lifting heavy objects, not MCA, not MVA, not recent illness, not recent injury and not twisting    Relieved by:  Nothing  Worsened by: Movement  Ineffective treatments:  Muscle relaxants (Prednisone)  Associated symptoms: paresthesias    Associated symptoms: no abdominal pain, no abdominal swelling, no bladder incontinence, no bowel incontinence, no chest pain, no dysuria, no fever, no headaches, no leg pain, no numbness, no pelvic pain, no perianal numbness, no tingling and no weakness    Risk factors: steroid use    Risk factors: no hx of cancer, no hx of osteoporosis and not obese        Prior to Admission Medications   Prescriptions Last Dose Informant Patient Reported? Taking?    DULoxetine (CYMBALTA) 20 mg capsule   Yes No   Sig: Take 20 mg by mouth daily   QUEtiapine Fumarate (SEROQUEL PO)   Yes No   Sig: Take 600 mg by mouth daily at bedtime     clonazePAM (KlonoPIN) 2 mg tablet   Yes No   Sig: Take 2 mg by mouth 2 (two) times a day as needed for seizures  cyclobenzaprine (FLEXERIL) 5 mg tablet   No No   Sig: Take 1 tablet (5 mg total) by mouth as needed in the morning and 1 tablet (5 mg total) as needed at noon and 1 tablet (5 mg total) as needed in the evening for muscle spasms  estradiol (ESTRACE) 1 mg tablet   Yes No   Sig: Take 2 mg by mouth   naproxen (NAPROSYN) 500 mg tablet   No No   Sig: Take 1 tablet (500 mg total) by mouth 2 (two) times a day with meals   Patient not taking: No sig reported   permethrin (ELIMITE) 5 % cream   No No   Sig: Apply from neck to sole of feet and leave on for 8-12 hours before showering off  Repeat in 1 week if no improvement in rash   Patient not taking: No sig reported   predniSONE 10 mg tablet   No No   Sig: Take 7T PO x 1 day, then take 6T PO x 1 day, and continue to decrease  by 1T until finished  Quantity-28   Patient not taking: Reported on 5/31/2022   zolpidem (AMBIEN) 10 mg tablet   Yes No   Sig: Take 10 mg by mouth daily at bedtime      Facility-Administered Medications: None       Past Medical History:   Diagnosis Date    Asthma     Migraine     Pituitary tumor     Psychiatric disorder     Depression       Past Surgical History:   Procedure Laterality Date    HYSTERECTOMY         Family History   Problem Relation Age of Onset    No Known Problems Mother     Lymphoma Sister     No Known Problems Maternal Grandmother     No Known Problems Paternal Grandmother     No Known Problems Maternal Aunt     No Known Problems Maternal Aunt      I have reviewed and agree with the history as documented      E-Cigarette/Vaping    E-Cigarette Use Never User      E-Cigarette/Vaping Substances    Nicotine Yes vape    THC No     CBD No     Flavoring No     Other No     Unknown No      Social History     Tobacco Use    Smoking status: Never Smoker    Smokeless tobacco: Never Used   Vaping Use    Vaping Use: Never used   Substance Use Topics    Alcohol use: No    Drug use: No       Review of Systems   Constitutional: Negative  Negative for chills, diaphoresis, fatigue and fever  HENT: Negative  Negative for congestion, rhinorrhea and sore throat  Eyes: Negative  Negative for discharge, redness and itching  Respiratory: Negative  Negative for apnea, cough, chest tightness, shortness of breath and wheezing  Cardiovascular: Negative for chest pain, palpitations and leg swelling  Gastrointestinal: Negative  Negative for abdominal pain and bowel incontinence  Endocrine: Negative  Genitourinary: Negative  Negative for bladder incontinence, dysuria, flank pain, frequency, pelvic pain and urgency  Musculoskeletal: Positive for back pain  Skin: Negative  Allergic/Immunologic: Negative  Neurological: Positive for paresthesias  Negative for dizziness, tingling, syncope, weakness, light-headedness, numbness and headaches  Hematological: Negative  All other systems reviewed and are negative  Physical Exam  Physical Exam  Vitals and nursing note reviewed  Constitutional:       General: She is awake  She is not in acute distress  Appearance: Normal appearance  She is well-developed and overweight  She is not ill-appearing, toxic-appearing or diaphoretic  HENT:      Head: Normocephalic and atraumatic  Right Ear: External ear normal       Left Ear: External ear normal       Nose: Nose normal       Mouth/Throat:      Mouth: Mucous membranes are moist       Pharynx: No oropharyngeal exudate  Eyes:      General: No scleral icterus  Right eye: No discharge  Left eye: No discharge  Conjunctiva/sclera: Conjunctivae normal    Neck:      Thyroid: No thyromegaly  Vascular: No JVD  Trachea: No tracheal deviation  Cardiovascular:      Rate and Rhythm: Normal rate and regular rhythm  Heart sounds: Normal heart sounds  No murmur heard  No friction rub  No gallop     Pulmonary:      Effort: Pulmonary effort is normal  No respiratory distress  Breath sounds: Normal breath sounds  No stridor  No wheezing, rhonchi or rales  Chest:      Chest wall: No tenderness  Abdominal:      General: Bowel sounds are normal  There is no distension  Palpations: Abdomen is soft  There is no mass  Tenderness: There is no abdominal tenderness  Hernia: No hernia is present  Musculoskeletal:         General: Tenderness present  No swelling, deformity or signs of injury  Normal range of motion  Thoracic back: Tenderness present  No swelling, edema, deformity, signs of trauma, lacerations, spasms or bony tenderness  Normal range of motion  No scoliosis  Lumbar back: Tenderness present  No swelling, deformity, signs of trauma, lacerations, spasms or bony tenderness  Normal range of motion  Negative right straight leg raise test and negative left straight leg raise test  No scoliosis  Back:       Right lower leg: No edema  Left lower leg: No edema  Skin:     General: Skin is warm and dry  Coloration: Skin is not jaundiced or pale  Findings: No bruising, erythema, lesion or rash  Neurological:      General: No focal deficit present  Mental Status: She is alert and oriented to person, place, and time  Motor: No weakness or abnormal muscle tone  Deep Tendon Reflexes: Reflexes are normal and symmetric  Reflexes normal    Psychiatric:         Mood and Affect: Mood normal          Behavior: Behavior is cooperative           Vital Signs  ED Triage Vitals [06/03/22 0801]   Temperature Pulse Respirations Blood Pressure SpO2   98 2 °F (36 8 °C) 88 17 113/75 98 %      Temp Source Heart Rate Source Patient Position - Orthostatic VS BP Location FiO2 (%)   Oral Monitor Sitting Right arm --      Pain Score       10 - Worst Possible Pain           Vitals:    06/03/22 0801   BP: 113/75   Pulse: 88   Patient Position - Orthostatic VS: Sitting         Visual Acuity      ED Medications  Medications   ketorolac (TORADOL) injection 30 mg (has no administration in time range)       Diagnostic Studies  Results Reviewed     None                 No orders to display              Procedures  Procedures         ED Course                                             MDM  Number of Diagnoses or Management Options  Diagnosis management comments: 51-year-old female presents to the ED with ongoing back pain  She states the pain involves her entire spine  No trauma  She was recently seen by Orthopedics and had a lumbar spine x-ray showing minimal degenerative changes  She was placed on prednisone and Flexeril  She states this isn't helping  No bowel or bladder incontinence  She has not yet followed up with physical therapy  On exam she is in no acute distress  She does have diffuse tenderness of the para spinal musculature of the thoracic and lumbar spine bilaterally  Normal strength and reflexes of the lower extremities  Will give Toradol here along with Naprosyn for home use and refer to comprehensive spine  She is stable for discharge  Disposition  Final diagnoses:   Acute exacerbation of chronic low back pain     Time reflects when diagnosis was documented in both MDM as applicable and the Disposition within this note     Time User Action Codes Description Comment    6/3/2022  9:15 AM Flo Gonzales Add [M54 50,  G89 29] Acute exacerbation of chronic low back pain       ED Disposition     ED Disposition   Discharge    Condition   Good    Date/Time   Fri Shawn 3, 2022  9:15 AM    Comment   [place] discharge to home/self care                 Follow-up Information     Follow up With Specialties Details Why Contact Info Additional Information    [facility] Physical Therapy Schedule an appointment as soon as possible for a visit in 1 day  [phone]          Patient's Medications   Discharge Prescriptions    LIDOCAINE (LIDODERM) 5 %    Apply 1 patch topically daily Remove & Discard patch within 12 hours or as directed by MD   Placed on area of maximum discomfort       Start Date: 6/3/2022  End Date: --       Order Dose: 1 patch       Quantity: 5 patch    Refills: 0    NAPROXEN (NAPROSYN) 500 MG TABLET    Take 1 tablet (500 mg total) by mouth every 12 (twelve) hours as needed for mild pain or moderate pain       Start Date: 6/3/2022  End Date: --       Order Dose: 500 mg       Quantity: 15 tablet    Refills: 0       No discharge procedures on file      PDMP Review     None          ED Provider  Electronically Signed by           Saravanan Jackson DO  06/03/22 6451

## 2022-06-04 ENCOUNTER — HOSPITAL ENCOUNTER (OUTPATIENT)
Facility: HOSPITAL | Age: 47
Setting detail: OBSERVATION
Discharge: HOME | End: 2022-06-06
Attending: EMERGENCY MEDICINE | Admitting: INTERNAL MEDICINE
Payer: COMMERCIAL

## 2022-06-04 ENCOUNTER — APPOINTMENT (EMERGENCY)
Dept: CT IMAGING | Facility: HOSPITAL | Age: 47
End: 2022-06-04
Payer: COMMERCIAL

## 2022-06-04 DIAGNOSIS — F33.3 SEVERE EPISODE OF RECURRENT MAJOR DEPRESSIVE DISORDER, WITH PSYCHOTIC FEATURES (HCC): ICD-10-CM

## 2022-06-04 DIAGNOSIS — G93.40 ENCEPHALOPATHY: Primary | ICD-10-CM

## 2022-06-04 DIAGNOSIS — R65.10 SIRS (SYSTEMIC INFLAMMATORY RESPONSE SYNDROME) (HCC): ICD-10-CM

## 2022-06-04 LAB
ALBUMIN SERPL BCP-MCNC: 3.4 G/DL (ref 3.5–5)
ALP SERPL-CCNC: 69 U/L (ref 46–116)
ALT SERPL W P-5'-P-CCNC: 27 U/L (ref 12–78)
ANION GAP SERPL CALCULATED.3IONS-SCNC: 10 MMOL/L (ref 4–13)
APAP SERPL-MCNC: <2 UG/ML (ref 10–20)
APTT PPP: 29 SECONDS (ref 23–37)
AST SERPL W P-5'-P-CCNC: 84 U/L (ref 5–45)
BASE EX.OXY STD BLDV CALC-SCNC: 93.7 % (ref 60–80)
BASE EXCESS BLDV CALC-SCNC: -0.9 MMOL/L
BASOPHILS # BLD AUTO: 0.03 THOUSANDS/ΜL (ref 0–0.1)
BASOPHILS NFR BLD AUTO: 0 % (ref 0–1)
BILIRUB SERPL-MCNC: 0.4 MG/DL (ref 0.2–1)
BUN SERPL-MCNC: 15 MG/DL (ref 5–25)
CALCIUM ALBUM COR SERPL-MCNC: 9.2 MG/DL (ref 8.3–10.1)
CALCIUM SERPL-MCNC: 8.7 MG/DL (ref 8.3–10.1)
CHLORIDE SERPL-SCNC: 102 MMOL/L (ref 100–108)
CO2 SERPL-SCNC: 26 MMOL/L (ref 21–32)
CREAT SERPL-MCNC: 1.33 MG/DL (ref 0.6–1.3)
EOSINOPHIL # BLD AUTO: 0.03 THOUSAND/ΜL (ref 0–0.61)
EOSINOPHIL NFR BLD AUTO: 0 % (ref 0–6)
ERYTHROCYTE [DISTWIDTH] IN BLOOD BY AUTOMATED COUNT: 13.2 % (ref 11.6–15.1)
ETHANOL SERPL-MCNC: <3 MG/DL (ref 0–3)
GFR SERPL CREATININE-BSD FRML MDRD: 47 ML/MIN/1.73SQ M
GLUCOSE SERPL-MCNC: 126 MG/DL (ref 65–140)
GLUCOSE SERPL-MCNC: 95 MG/DL (ref 65–140)
HCG SERPL QL: NEGATIVE
HCO3 BLDV-SCNC: 23.7 MMOL/L (ref 24–30)
HCT VFR BLD AUTO: 37.3 % (ref 34.8–46.1)
HGB BLD-MCNC: 12.3 G/DL (ref 11.5–15.4)
IMM GRANULOCYTES # BLD AUTO: 0.1 THOUSAND/UL (ref 0–0.2)
IMM GRANULOCYTES NFR BLD AUTO: 1 % (ref 0–2)
INR PPP: 1.13 (ref 0.84–1.19)
LYMPHOCYTES # BLD AUTO: 1.67 THOUSANDS/ΜL (ref 0.6–4.47)
LYMPHOCYTES NFR BLD AUTO: 12 % (ref 14–44)
MCH RBC QN AUTO: 30.2 PG (ref 26.8–34.3)
MCHC RBC AUTO-ENTMCNC: 33 G/DL (ref 31.4–37.4)
MCV RBC AUTO: 92 FL (ref 82–98)
MONOCYTES # BLD AUTO: 1.03 THOUSAND/ΜL (ref 0.17–1.22)
MONOCYTES NFR BLD AUTO: 7 % (ref 4–12)
NEUTROPHILS # BLD AUTO: 11.53 THOUSANDS/ΜL (ref 1.85–7.62)
NEUTS SEG NFR BLD AUTO: 80 % (ref 43–75)
NRBC BLD AUTO-RTO: 0 /100 WBCS
O2 CT BLDV-SCNC: 17.6 ML/DL
PCO2 BLDV: 39.1 MM HG (ref 42–50)
PH BLDV: 7.4 [PH] (ref 7.3–7.4)
PLATELET # BLD AUTO: 258 THOUSANDS/UL (ref 149–390)
PMV BLD AUTO: 10.2 FL (ref 8.9–12.7)
PO2 BLDV: 78.4 MM HG (ref 35–45)
POTASSIUM SERPL-SCNC: 4.3 MMOL/L (ref 3.5–5.3)
PROT SERPL-MCNC: 6.6 G/DL (ref 6.4–8.2)
PROTHROMBIN TIME: 14.3 SECONDS (ref 11.6–14.5)
RBC # BLD AUTO: 4.07 MILLION/UL (ref 3.81–5.12)
SALICYLATES SERPL-MCNC: <3 MG/DL (ref 3–20)
SODIUM SERPL-SCNC: 138 MMOL/L (ref 136–145)
WBC # BLD AUTO: 14.39 THOUSAND/UL (ref 4.31–10.16)

## 2022-06-04 PROCEDURE — 84145 PROCALCITONIN (PCT): CPT | Performed by: NURSE PRACTITIONER

## 2022-06-04 PROCEDURE — 82948 REAGENT STRIP/BLOOD GLUCOSE: CPT

## 2022-06-04 PROCEDURE — 85610 PROTHROMBIN TIME: CPT | Performed by: EMERGENCY MEDICINE

## 2022-06-04 PROCEDURE — 70450 CT HEAD/BRAIN W/O DYE: CPT

## 2022-06-04 PROCEDURE — 99285 EMERGENCY DEPT VISIT HI MDM: CPT | Performed by: EMERGENCY MEDICINE

## 2022-06-04 PROCEDURE — 93005 ELECTROCARDIOGRAM TRACING: CPT

## 2022-06-04 PROCEDURE — 82805 BLOOD GASES W/O2 SATURATION: CPT | Performed by: EMERGENCY MEDICINE

## 2022-06-04 PROCEDURE — 36415 COLL VENOUS BLD VENIPUNCTURE: CPT | Performed by: EMERGENCY MEDICINE

## 2022-06-04 PROCEDURE — 96360 HYDRATION IV INFUSION INIT: CPT

## 2022-06-04 PROCEDURE — 85025 COMPLETE CBC W/AUTO DIFF WBC: CPT | Performed by: EMERGENCY MEDICINE

## 2022-06-04 PROCEDURE — 80143 DRUG ASSAY ACETAMINOPHEN: CPT | Performed by: EMERGENCY MEDICINE

## 2022-06-04 PROCEDURE — 80053 COMPREHEN METABOLIC PANEL: CPT | Performed by: EMERGENCY MEDICINE

## 2022-06-04 PROCEDURE — 80179 DRUG ASSAY SALICYLATE: CPT | Performed by: EMERGENCY MEDICINE

## 2022-06-04 PROCEDURE — 84703 CHORIONIC GONADOTROPIN ASSAY: CPT | Performed by: EMERGENCY MEDICINE

## 2022-06-04 PROCEDURE — 85730 THROMBOPLASTIN TIME PARTIAL: CPT | Performed by: EMERGENCY MEDICINE

## 2022-06-04 PROCEDURE — 82077 ASSAY SPEC XCP UR&BREATH IA: CPT | Performed by: EMERGENCY MEDICINE

## 2022-06-04 PROCEDURE — 99285 EMERGENCY DEPT VISIT HI MDM: CPT

## 2022-06-04 PROCEDURE — G1004 CDSM NDSC: HCPCS

## 2022-06-04 RX ORDER — NALOXONE HYDROCHLORIDE 1 MG/ML
1 INJECTION PARENTERAL ONCE
Status: COMPLETED | OUTPATIENT
Start: 2022-06-04 | End: 2022-06-04

## 2022-06-04 RX ORDER — CHOLECALCIFEROL (VITAMIN D3) 125 MCG
2000 CAPSULE ORAL DAILY
COMMUNITY

## 2022-06-04 RX ADMIN — SODIUM CHLORIDE 1000 ML: 0.9 INJECTION, SOLUTION INTRAVENOUS at 21:25

## 2022-06-05 PROBLEM — N17.9 ACUTE KIDNEY INJURY (HCC): Status: ACTIVE | Noted: 2022-06-05

## 2022-06-05 PROBLEM — G93.40 ACUTE ENCEPHALOPATHY: Status: ACTIVE | Noted: 2022-06-05

## 2022-06-05 PROBLEM — F33.3 SEVERE EPISODE OF RECURRENT MAJOR DEPRESSIVE DISORDER, WITH PSYCHOTIC FEATURES (HCC): Chronic | Status: ACTIVE | Noted: 2022-06-05

## 2022-06-05 PROBLEM — R65.10 SIRS (SYSTEMIC INFLAMMATORY RESPONSE SYNDROME) (HCC): Status: ACTIVE | Noted: 2022-06-05

## 2022-06-05 PROBLEM — J45.20 MILD INTERMITTENT ASTHMA WITHOUT COMPLICATION: Chronic | Status: ACTIVE | Noted: 2022-06-05

## 2022-06-05 LAB
ALBUMIN SERPL BCP-MCNC: 2.7 G/DL (ref 3.5–5)
ALP SERPL-CCNC: 60 U/L (ref 46–116)
ALT SERPL W P-5'-P-CCNC: 33 U/L (ref 12–78)
AMORPH URATE CRY URNS QL MICRO: ABNORMAL /HPF
AMPHETAMINES SERPL QL SCN: NEGATIVE
ANION GAP SERPL CALCULATED.3IONS-SCNC: 8 MMOL/L (ref 4–13)
AST SERPL W P-5'-P-CCNC: 120 U/L (ref 5–45)
ATRIAL RATE: 107 BPM
BACTERIA UR QL AUTO: ABNORMAL /HPF
BARBITURATES UR QL: NEGATIVE
BASOPHILS # BLD AUTO: 0.02 THOUSANDS/ΜL (ref 0–0.1)
BASOPHILS NFR BLD AUTO: 0 % (ref 0–1)
BENZODIAZ UR QL: POSITIVE
BILIRUB SERPL-MCNC: 0.35 MG/DL (ref 0.2–1)
BILIRUB UR QL STRIP: NEGATIVE
BUN SERPL-MCNC: 14 MG/DL (ref 5–25)
CALCIUM ALBUM COR SERPL-MCNC: 8.5 MG/DL (ref 8.3–10.1)
CALCIUM SERPL-MCNC: 7.5 MG/DL (ref 8.3–10.1)
CHLORIDE SERPL-SCNC: 105 MMOL/L (ref 100–108)
CLARITY UR: CLEAR
CO2 SERPL-SCNC: 25 MMOL/L (ref 21–32)
COCAINE UR QL: NEGATIVE
COLOR UR: YELLOW
CREAT SERPL-MCNC: 1.13 MG/DL (ref 0.6–1.3)
EOSINOPHIL # BLD AUTO: 0.03 THOUSAND/ΜL (ref 0–0.61)
EOSINOPHIL NFR BLD AUTO: 0 % (ref 0–6)
ERYTHROCYTE [DISTWIDTH] IN BLOOD BY AUTOMATED COUNT: 13.5 % (ref 11.6–15.1)
GFR SERPL CREATININE-BSD FRML MDRD: 58 ML/MIN/1.73SQ M
GLUCOSE P FAST SERPL-MCNC: 101 MG/DL (ref 65–99)
GLUCOSE SERPL-MCNC: 101 MG/DL (ref 65–140)
GLUCOSE UR STRIP-MCNC: NEGATIVE MG/DL
HCT VFR BLD AUTO: 37 % (ref 34.8–46.1)
HGB BLD-MCNC: 12.1 G/DL (ref 11.5–15.4)
HGB UR QL STRIP.AUTO: ABNORMAL
IMM GRANULOCYTES # BLD AUTO: 0.07 THOUSAND/UL (ref 0–0.2)
IMM GRANULOCYTES NFR BLD AUTO: 0 % (ref 0–2)
KETONES UR STRIP-MCNC: NEGATIVE MG/DL
LEUKOCYTE ESTERASE UR QL STRIP: NEGATIVE
LYMPHOCYTES # BLD AUTO: 1.34 THOUSANDS/ΜL (ref 0.6–4.47)
LYMPHOCYTES NFR BLD AUTO: 8 % (ref 14–44)
MCH RBC QN AUTO: 31.4 PG (ref 26.8–34.3)
MCHC RBC AUTO-ENTMCNC: 32.7 G/DL (ref 31.4–37.4)
MCV RBC AUTO: 96 FL (ref 82–98)
METHADONE UR QL: NEGATIVE
MONOCYTES # BLD AUTO: 1.04 THOUSAND/ΜL (ref 0.17–1.22)
MONOCYTES NFR BLD AUTO: 6 % (ref 4–12)
NEUTROPHILS # BLD AUTO: 15.09 THOUSANDS/ΜL (ref 1.85–7.62)
NEUTS SEG NFR BLD AUTO: 86 % (ref 43–75)
NITRITE UR QL STRIP: NEGATIVE
NON-SQ EPI CELLS URNS QL MICRO: ABNORMAL /HPF
NRBC BLD AUTO-RTO: 0 /100 WBCS
OPIATES UR QL SCN: NEGATIVE
OXYCODONE+OXYMORPHONE UR QL SCN: NEGATIVE
P AXIS: 62 DEGREES
PCP UR QL: NEGATIVE
PH UR STRIP.AUTO: 6 [PH]
PLATELET # BLD AUTO: 220 THOUSANDS/UL (ref 149–390)
PMV BLD AUTO: 10.6 FL (ref 8.9–12.7)
POTASSIUM SERPL-SCNC: 4.1 MMOL/L (ref 3.5–5.3)
PR INTERVAL: 176 MS
PROCALCITONIN SERPL-MCNC: <0.05 NG/ML
PROT SERPL-MCNC: 6.1 G/DL (ref 6.4–8.2)
PROT UR STRIP-MCNC: ABNORMAL MG/DL
QRS AXIS: 10 DEGREES
QRSD INTERVAL: 96 MS
QT INTERVAL: 302 MS
QTC INTERVAL: 403 MS
RBC # BLD AUTO: 3.85 MILLION/UL (ref 3.81–5.12)
RBC #/AREA URNS AUTO: ABNORMAL /HPF
SODIUM SERPL-SCNC: 138 MMOL/L (ref 136–145)
SP GR UR STRIP.AUTO: >=1.03 (ref 1–1.03)
T WAVE AXIS: 55 DEGREES
THC UR QL: NEGATIVE
UROBILINOGEN UR QL STRIP.AUTO: 0.2 E.U./DL
VENTRICULAR RATE: 107 BPM
WBC # BLD AUTO: 17.59 THOUSAND/UL (ref 4.31–10.16)
WBC #/AREA URNS AUTO: ABNORMAL /HPF

## 2022-06-05 PROCEDURE — 99220 PR INITIAL OBSERVATION CARE/DAY 70 MINUTES: CPT | Performed by: STUDENT IN AN ORGANIZED HEALTH CARE EDUCATION/TRAINING PROGRAM

## 2022-06-05 PROCEDURE — 85025 COMPLETE CBC W/AUTO DIFF WBC: CPT | Performed by: NURSE PRACTITIONER

## 2022-06-05 PROCEDURE — 81001 URINALYSIS AUTO W/SCOPE: CPT | Performed by: NURSE PRACTITIONER

## 2022-06-05 PROCEDURE — 80053 COMPREHEN METABOLIC PANEL: CPT | Performed by: NURSE PRACTITIONER

## 2022-06-05 PROCEDURE — 80307 DRUG TEST PRSMV CHEM ANLYZR: CPT | Performed by: EMERGENCY MEDICINE

## 2022-06-05 PROCEDURE — 93010 ELECTROCARDIOGRAM REPORT: CPT | Performed by: INTERNAL MEDICINE

## 2022-06-05 RX ORDER — SODIUM CHLORIDE 9 MG/ML
75 INJECTION, SOLUTION INTRAVENOUS CONTINUOUS
Status: DISPENSED | OUTPATIENT
Start: 2022-06-05 | End: 2022-06-05

## 2022-06-05 RX ORDER — ACETAMINOPHEN 325 MG/1
975 TABLET ORAL EVERY 6 HOURS PRN
Status: DISCONTINUED | OUTPATIENT
Start: 2022-06-05 | End: 2022-06-06 | Stop reason: HOSPADM

## 2022-06-05 RX ORDER — MELATONIN
2000 DAILY
Status: DISCONTINUED | OUTPATIENT
Start: 2022-06-05 | End: 2022-06-06 | Stop reason: HOSPADM

## 2022-06-05 RX ORDER — ONDANSETRON 2 MG/ML
4 INJECTION INTRAMUSCULAR; INTRAVENOUS EVERY 6 HOURS PRN
Status: DISCONTINUED | OUTPATIENT
Start: 2022-06-05 | End: 2022-06-06 | Stop reason: HOSPADM

## 2022-06-05 RX ORDER — MAGNESIUM HYDROXIDE/ALUMINUM HYDROXICE/SIMETHICONE 120; 1200; 1200 MG/30ML; MG/30ML; MG/30ML
30 SUSPENSION ORAL EVERY 6 HOURS PRN
Status: DISCONTINUED | OUTPATIENT
Start: 2022-06-05 | End: 2022-06-06 | Stop reason: HOSPADM

## 2022-06-05 RX ORDER — SODIUM CHLORIDE 9 MG/ML
125 INJECTION, SOLUTION INTRAVENOUS CONTINUOUS
Status: DISCONTINUED | OUTPATIENT
Start: 2022-06-05 | End: 2022-06-05

## 2022-06-05 RX ORDER — SIMETHICONE 80 MG
80 TABLET,CHEWABLE ORAL 4 TIMES DAILY PRN
Status: DISCONTINUED | OUTPATIENT
Start: 2022-06-05 | End: 2022-06-06 | Stop reason: HOSPADM

## 2022-06-05 RX ADMIN — SODIUM CHLORIDE 125 ML/HR: 0.9 INJECTION, SOLUTION INTRAVENOUS at 10:28

## 2022-06-05 RX ADMIN — SODIUM CHLORIDE 125 ML/HR: 0.9 INJECTION, SOLUTION INTRAVENOUS at 02:32
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
## 2022-06-05 NOTE — PLAN OF CARE
Problem: MOBILITY - ADULT  Goal: Maintain or return to baseline ADL function  Description: INTERVENTIONS:  -  Assess patient's ability to carry out ADLs; assess patient's baseline for ADL function and identify physical deficits which impact ability to perform ADLs (bathing, care of mouth/teeth, toileting, grooming, dressing, etc )  - Assess/evaluate cause of self-care deficits   - Assess range of motion  - Assess patient's mobility; develop plan if impaired  - Assess patient's need for assistive devices and provide as appropriate  - Encourage maximum independence but intervene and supervise when necessary  - Involve family in performance of ADLs  - Assess for home care needs following discharge   - Consider OT consult to assist with ADL evaluation and planning for discharge  - Provide patient education as appropriate  Outcome: Progressing  Goal: Maintains/Returns to pre admission functional level  Description: INTERVENTIONS:  - Perform BMAT or MOVE assessment daily    - Set and communicate daily mobility goal to care team and patient/family/caregiver  - Collaborate with rehabilitation services on mobility goals if consulted  - Perform Range of Motion 3 times a day  - Reposition patient every 2 hours    - Dangle patient 3 times a day  - Stand patient 3 times a day  - Ambulate patient 3 times a day  - Out of bed to chair 3 times a day   - Out of bed for meals 3 times a day  - Out of bed for toileting  - Record patient progress and toleration of activity level   Outcome: Progressing     Problem: Potential for Falls  Goal: Patient will remain free of falls  Description: INTERVENTIONS:  - Educate patient/family on patient safety including physical limitations  - Instruct patient to call for assistance with activity   - Consult OT/PT to assist with strengthening/mobility   - Keep Call bell within reach  - Keep bed low and locked with side rails adjusted as appropriate  - Keep care items and personal belongings within reach  - Initiate and maintain comfort rounds  - Make Fall Risk Sign visible to staff  - Offer Toileting every 2 Hours, in advance of need  - Initiate/Maintain bed alarm  - Apply yellow socks and bracelet for high fall risk patients  - Consider moving patient to room near nurses station  Outcome: Progressing     Problem: Prexisting or High Potential for Compromised Skin Integrity  Goal: Skin integrity is maintained or improved  Description: INTERVENTIONS:  - Identify patients at risk for skin breakdown  - Assess and monitor skin integrity  - Assess and monitor nutrition and hydration status  - Monitor labs   - Assess for incontinence   - Turn and reposition patient  - Assist with mobility/ambulation  - Relieve pressure over bony prominences  - Avoid friction and shearing  - Provide appropriate hygiene as needed including keeping skin clean and dry  - Evaluate need for skin moisturizer/barrier cream  - Collaborate with interdisciplinary team   - Patient/family teaching  - Consider wound care consult   Outcome: Progressing

## 2022-06-05 NOTE — ASSESSMENT & PLAN NOTE
History of pituitary mass, followed outpatient by Neurosurgery  Stable mass noted on MRI in May  Continue outpatient follow-up with Neurosurgery

## 2022-06-05 NOTE — CONSULTS
TeleConsultation - [address] 52 y o  female MRN: [id number]  Unit/Bed#: [place] [address] -01 Encounter: [id number]        REQUIRED DOCUMENTATION:     1  This service was provided via Telemedicine  2  Provider located at [facility]   3  TeleMed provider: Kaylen Cazares MD   4  Identify all parties in room with patient during tele consult: Patient Only  5  Patient was then informed that this was a Telemedicine visit and that the exam was being conducted confidentially over secure lines  My office door was closed  No one else was in the room  Patient acknowledged consent and understanding of privacy and security of the Telemedicine visit, and gave us permission to have the assistant stay in the room in order to assist with the history and to conduct the exam   I informed the patient that I have reviewed their record in Epic and presented the opportunity for them to ask any questions regarding the visit today  The patient agreed to participate  Assessment/Plan     Assessment:  Mya Marie is a 53 y/o female with PMH significant for MDD, Severe, With Psychotic Features that presented with AMS  Patient's AMS likely related to polypharmacy (UDS+ for BDZ) in the setting of insomnia  Patient is progressing well and recommend restarting home regimen of Duloxetine 60 mg qam and Seroquel 600 mg qhs  Patient is safe for discharge home and does not require voluntary or involuntary psychiatric admission nor 1:1      Plan:   Risks, benefits and possible side effects of Medications:   Risks, benefits, and possible side effects of medications explained to patient and patient verbalizes understanding        -Restart Cymbalta 60 mg qam     -Restart Seroquel 600 mg qhs (Patient reported 300 mg BID)     Chief Complaint: " No podia dormir"    History of Present Illness     Reason for Consult / Principal Problem: AMS      Per ED Note by Dr Flora Jones:   This is a 19-year-old female with history of migraine, depression who presents with altered mental status  Patient was found by family on conscious  They reported concern for overdose on Seroquel  Patient did have several empty medication bottles  EMS gave 2 mg of Narcan with minimal response  On arrival, the patient is arousable to voice and sternal rub  She is able to tell me her name  However, she quickly falls back to sleep  She is unable to give me any more history  Patient was seen yesterday for back pain  She was started on naproxen and Lidoderm  It appears that she did endorse fleeting thoughts of suicidal ideation  However, states that she would not act upon them  She does have outpatient resources per the note       10:14 PM   now bedside  States that he has been with the patient all day today  States that she has been "sleepy" all day today  He gave her a dose of Seroquel at around 9:00 a m  This morning  She has not taken any other medications per the   States that he has been trying to feed her throughout the day today  He denies the patient taking any more medication than prescribed  He denies any drug or alcohol use    Patient states that she was having trouble sleeping related to persistent back pain  Patient states that she took an extra dose of her husbands Seroquel and had take some left over medications as well  Patient's  provided significant collateral stating he had not seen her that sleepy before and was concerned   reports her back pain has been very severe  Patient and  agree that overall her medication regimen is very effective and while she does endorse some remote passive SI denied any current active or passive SI/HI  Patient denied any depressive symptoms and states she follows with her outpatient provider every 2-3 weeks        Inpatient consult to Psychiatry  Consult performed by: Nicole Moya MD  Consult ordered by: FORREST Park          Psychiatric Review Of Systems:  sleep: yes  appetite changes: no  weight changes: no  energy/anergy: no  interest/pleasure/anhedonia: no  somatic symptoms: no  anxiety/panic: no  patricia: no  guilty/hopeless: no  self injurious behavior/risky behavior: no    Historical Information   Past Psychiatric History: In Patient Last IP was 2011 for depression  Currently in treatment with OP Psychiatrist   Past Suicide attempts: None  Past Violent behavior: None  Past Psychiatric medication trial: Ambien    Substance Abuse History: Denied    Use of Alcohol: denied    Longest clean time: months  History of IP/OP rehabilitation program: None  Smoking history: None  Use of Caffeine: denies use    Family Psychiatric History:   Psychiatric Illness Mother  Illness: Depression    Social History  Education: high school diploma/GED  Learning Disabilities: None  Marital history:   Living arrangement, social support: The patient lives in home with   Occupational History: on permanent disability  Functioning Relationships: good support system    Other Pertinent History: None    Traumatic History:   Abuse: None  Other Traumatic Events: None    Past Medical History:   Diagnosis Date    Asthma     Migraine     Pituitary tumor     Psychiatric disorder     Depression       Medical Review Of Systems:  Review of Systems    Meds/Allergies   all current active meds have been reviewed  No Known Allergies    Objective   Vital signs in last 24 hours:  Temp:  [96 4 °F (35 8 °C)-98 6 °F (37 °C)] 97 1 °F (36 2 °C)  HR:  [] 97  Resp:  [14-20] 15  BP: ()/(68-92) 115/82      Intake/Output Summary (Last 24 hours) at 6/5/2022 1723  Last data filed at 6/5/2022 1602  Gross per 24 hour   Intake 1000 ml   Output 1550 ml   Net -550 ml       Mental Status Evaluation:  Appearance:  age appropriate   Behavior:  normal   Speech:  normal pitch and normal volume   Mood:  normal   Affect:  normal   Language: naming objects   Thought Process:  normal   Thought Content:  normal   Perceptual Disturbances: None   Risk Potential: None   Sensorium:  person, place and time/date   Cognition:  recent and remote memory grossly intact   Consciousness:  alert    Attention: attention span and concentration were age appropriate   Intellect: within normal limits   Fund of Knowledge: awareness of current events: President   Insight:  fair   Judgment: fair   Muscle Strength and Tone: NFT   Gait/Station: normal gait/station   Motor Activity: no abnormal movements     Lab Results: Reviewed, Notable for likely Demargination  With elevated WBC/Neutrophil count   Imaging Studies: Reviewed  EKG, Pathology, and Other Studies: Reviewed    Code Status: Level 1 - Full Code  Advance Directive and Living Will:      Power of :    POLST:      Counseling / Coordination of Care  Total floor / unit time spent today 30 minutes  Greater than 50% of total time was spent with the patient and / or family counseling and / or coordination of care  A description of the counseling / coordination of care: Direct Patient Care, Chart Review, and Documentation

## 2022-06-05 NOTE — ASSESSMENT & PLAN NOTE
SIRS POA with tachycardia and leukocytosis  Likely stress-induced  Urinalysis unremarkable  Procalcitonin negative  Afebrile  Recheck CBC in 1 week to ensure resolution

## 2022-06-05 NOTE — ASSESSMENT & PLAN NOTE
Patient brought in by family secondary to lethargy  Patient reports taking extra doses of Seroquel as she had not been sleeping over several days  No suicidal or homicidal ideations  Lethargy likely related to polypharmacy  CT head unremarkable  UDS on admission positive for benzodiazepines  Patient is on clonazepam at home  Psychiatry recommends continuing Seroquel and Cymbalta  On day of discharge, patient back to baseline  I recommended discontinuing Ambien on discharge  Only use clonazepam p r n    Follow-up with Psychiatry for ongoing titration of medications

## 2022-06-05 NOTE — ASSESSMENT & PLAN NOTE
· History of pituitary mass, followed outpatient by Neurosurgery  · Stable mass noted on MRI in May  · Continue outpatient follow-up with Neurosurgery
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
## 2022-06-05 NOTE — ED PROVIDER NOTES
History  Chief Complaint   Patient presents with    Medical Problem     Pt arrived via [facility] from home per EMS pt's family called due to pt found unconscious possibly took an unknown amount of seroquel  Pt was given 2mg narcan PTA  This is a 70-year-old female with history of migraine, depression who presents with altered mental status  Patient was found by family on conscious  They reported concern for overdose on Seroquel  Patient did have several empty medication bottles  EMS gave 2 mg of Narcan with minimal response  On arrival, the patient is arousable to voice and sternal rub  She is able to tell me her name  However, she quickly falls back to sleep  She is unable to give me any more history  Patient was seen yesterday for back pain  She was started on naproxen and Lidoderm  It appears that she did endorse fleeting thoughts of suicidal ideation  However, states that she would not act upon them  She does have outpatient resources per the note  10:14 PM   now bedside  States that he has been with the patient all day today  States that she has been "sleepy" all day today  He gave her a dose of Seroquel at around 9:00 a m  This morning  She has not taken any other medications per the   States that he has been trying to feed her throughout the day today  He denies the patient taking any more medication than prescribed  He denies any drug or alcohol use  Prior to Admission Medications   Prescriptions Last Dose Informant Patient Reported? Taking?    Cholecalciferol (Vitamin D3) 50 MCG (2000 UT) TABS   Yes Yes   Sig: Take 2,000 Units by mouth daily   DULoxetine (CYMBALTA) 20 mg capsule   Yes Yes   Sig: Take 60 mg by mouth daily   QUEtiapine Fumarate (SEROQUEL PO)   Yes Yes   Sig: Take 600 mg by mouth daily at bedtime     clonazePAM (KlonoPIN) 2 mg tablet   Yes Yes   Sig: Take 1 mg by mouth 2 (two) times a day   cyclobenzaprine (FLEXERIL) 5 mg tablet   No Yes   Sig: Take 1 tablet (5 mg total) by mouth as needed in the morning and 1 tablet (5 mg total) as needed at noon and 1 tablet (5 mg total) as needed in the evening for muscle spasms  estradiol (ESTRACE) 1 mg tablet   Yes Yes   Sig: Take 2 mg by mouth daily   lidocaine (LIDODERM) 5 %   No No   Sig: Apply 1 patch topically daily Remove & Discard patch within 12 hours or as directed by MD   Placed on area of maximum discomfort   naproxen (NAPROSYN) 500 mg tablet   No No   Sig: Take 1 tablet (500 mg total) by mouth 2 (two) times a day with meals   Patient not taking: No sig reported   naproxen (NAPROSYN) 500 mg tablet   No Yes   Sig: Take 1 tablet (500 mg total) by mouth every 12 (twelve) hours as needed for mild pain or moderate pain   permethrin (ELIMITE) 5 % cream   No No   Sig: Apply from neck to sole of feet and leave on for 8-12 hours before showering off  Repeat in 1 week if no improvement in rash   Patient not taking: No sig reported   predniSONE 10 mg tablet   No No   Sig: Take 7T PO x 1 day, then take 6T PO x 1 day, and continue to decrease  by 1T until finished  Quantity-28   Patient not taking: Reported on 5/31/2022   zolpidem (AMBIEN) 10 mg tablet Not Taking at Unknown time  Yes No   Sig: Take 10 mg by mouth daily at bedtime   Patient not taking: Reported on 6/4/2022      Facility-Administered Medications: None       Past Medical History:   Diagnosis Date    Asthma     Migraine     Pituitary tumor     Psychiatric disorder     Depression       Past Surgical History:   Procedure Laterality Date    HYSTERECTOMY         Family History   Problem Relation Age of Onset    No Known Problems Mother     Lymphoma Sister     No Known Problems Maternal Grandmother     No Known Problems Paternal Grandmother     No Known Problems Maternal Aunt     No Known Problems Maternal Aunt      I have reviewed and agree with the history as documented      E-Cigarette/Vaping    E-Cigarette Use Never User      E-Cigarette/Vaping Substances    Nicotine Yes vape    THC No     CBD No     Flavoring No     Other No     Unknown No      Social History     Tobacco Use    Smoking status: Never Smoker    Smokeless tobacco: Never Used   Vaping Use    Vaping Use: Never used   Substance Use Topics    Alcohol use: No    Drug use: No       Review of Systems   Unable to perform ROS: Mental status change       Physical Exam  Physical Exam  Constitutional:       General: She is not in acute distress  Appearance: Normal appearance  She is well-developed  HENT:      Mouth/Throat:      Pharynx: Uvula midline  Eyes:      General: Lids are normal  Gaze aligned appropriately  Extraocular Movements:      Right eye: No nystagmus  Left eye: No nystagmus  Conjunctiva/sclera: Conjunctivae normal       Pupils: Pupils are equal, round, and reactive to light  Neck:      Thyroid: No thyroid mass or thyromegaly  Trachea: Trachea normal    Cardiovascular:      Rate and Rhythm: Regular rhythm  Tachycardia present  Heart sounds: Normal heart sounds  No murmur heard  Pulmonary:      Effort: Pulmonary effort is normal       Breath sounds: Normal breath sounds  Abdominal:      General: Bowel sounds are normal       Palpations: Abdomen is soft  Tenderness: There is no abdominal tenderness  There is no guarding or rebound  Skin:     General: Skin is warm and dry  Neurological:      GCS: GCS eye subscore is 3  GCS verbal subscore is 3  GCS motor subscore is 5  Motor: No tremor or abnormal muscle tone  Comments: No clonus bilateral ankles  Psychiatric:         Speech: Speech is slurred           Vital Signs  ED Triage Vitals [06/04/22 2109]   Temperature Pulse Respirations Blood Pressure SpO2   98 6 °F (37 °C) (!) 112 14 98/68 98 %      Temp Source Heart Rate Source Patient Position - Orthostatic VS BP Location FiO2 (%)   Oral Monitor Lying Right arm --      Pain Score       --           Vitals:    06/04/22 2200 06/04/22 2215 06/04/22 2230 06/04/22 2245   BP: 141/91 133/84 124/82 122/78   Pulse: (!) 106 (!) 106 104 104   Patient Position - Orthostatic VS:  Sitting           Visual Acuity  Visual Acuity    Flowsheet Row Most Recent Value   L Pupil Size (mm) 8   R Pupil Size (mm) 8          ED Medications  Medications   naloxone (FOR EMS ONLY) (NARCAN) 2 MG/2ML injection 2 mg (0 mg Does not apply Given to EMS 6/4/22 2119)   sodium chloride 0 9 % bolus 1,000 mL (0 mL Intravenous Stopped 6/4/22 2241)       Diagnostic Studies  Results Reviewed     Procedure Component Value Units Date/Time    hCG, qualitative pregnancy [[id number]]  (Normal) Collected: 06/04/22 2122    Lab Status: Final result Specimen: Blood from Arm, Right Updated: 06/04/22 2205     Preg, Serum Negative    Salicylate level [[id number]]  (Abnormal) Collected: 06/04/22 2122    Lab Status: Final result Specimen: Blood from Arm, Right Updated: 33/99/42 8449     Salicylate Lvl <3 mg/dL     Acetaminophen level-If concentration is detectable, please discuss with medical  on call   [[id number]]  (Abnormal) Collected: 06/04/22 2122    Lab Status: Final result Specimen: Blood from Arm, Right Updated: 06/04/22 2204     Acetaminophen Level <2 ug/mL     Comprehensive metabolic panel [[id number]]  (Abnormal) Collected: 06/04/22 2122    Lab Status: Final result Specimen: Blood from Arm, Right Updated: 06/04/22 2159     Sodium 138 mmol/L      Potassium 4 3 mmol/L      Chloride 102 mmol/L      CO2 26 mmol/L      ANION GAP 10 mmol/L      BUN 15 mg/dL      Creatinine 1 33 mg/dL      Glucose 95 mg/dL      Calcium 8 7 mg/dL      Corrected Calcium 9 2 mg/dL      AST 84 U/L      ALT 27 U/L      Alkaline Phosphatase 69 U/L      Total Protein 6 6 g/dL      Albumin 3 4 g/dL      Total Bilirubin 0 40 mg/dL      eGFR 47 ml/min/1 73sq m     Narrative:      [place] guidelines for Chronic Kidney Disease (CKD):     Stage 1 with normal or high GFR (GFR > 90 mL/min/1 73 square meters)    Stage 2 Mild CKD (GFR = 60-89 mL/min/1 73 square meters)    Stage 3A Moderate CKD (GFR = 45-59 mL/min/1 73 square meters)    Stage 3B Moderate CKD (GFR = 30-44 mL/min/1 73 square meters)    Stage 4 Severe CKD (GFR = 15-29 mL/min/1 73 square meters)    Stage 5 End Stage CKD (GFR <15 mL/min/1 73 square meters)  Note: GFR calculation is accurate only with a steady state creatinine    Ethanol [[id number]]  (Normal) Collected: 06/04/22 2122    Lab Status: Final result Specimen: Blood from Arm, Right Updated: 06/04/22 2150     Ethanol Lvl <3 mg/dL     Protime-INR [[id number]]  (Normal) Collected: 06/04/22 2122    Lab Status: Final result Specimen: Blood from Arm, Right Updated: 06/04/22 2147     Protime 14 3 seconds      INR 1 13    APTT [[id number]]  (Normal) Collected: 06/04/22 2122    Lab Status: Final result Specimen: Blood from Arm, Right Updated: 06/04/22 2147     PTT 29 seconds     Fingerstick Glucose (POCT) [[id number]]  (Normal) Collected: 06/04/22 2140    Lab Status: Final result Updated: 06/04/22 2144     POC Glucose 126 mg/dl     Blood gas, venous [[id number]]  (Abnormal) Collected: 06/04/22 2122    Lab Status: Final result Specimen: Blood from Arm, Right Updated: 06/04/22 2134     pH, Kevin 7 400     pCO2, Kevin 39 1 mm Hg      pO2, Kevin 78 4 mm Hg      HCO3, Kevin 23 7 mmol/L      Base Excess, Kevin -0 9 mmol/L      O2 Content, Kevin 17 6 ml/dL      O2 HGB, VENOUS 93 7 %     CBC and differential [[id number]]  (Abnormal) Collected: 06/04/22 2122    Lab Status: Final result Specimen: Blood from Arm, Right Updated: 06/04/22 2134     WBC 14 39 Thousand/uL      RBC 4 07 Million/uL      Hemoglobin 12 3 g/dL      Hematocrit 37 3 %      MCV 92 fL      MCH 30 2 pg      MCHC 33 0 g/dL      RDW 13 2 %      MPV 10 2 fL      Platelets 350 Thousands/uL      nRBC 0 /100 WBCs      Neutrophils Relative 80 %      Immat GRANS % 1 %      Lymphocytes Relative 12 %      Monocytes Relative 7 %      Eosinophils Relative 0 %      Basophils Relative 0 %      Neutrophils Absolute 11 53 Thousands/µL      Immature Grans Absolute 0 10 Thousand/uL      Lymphocytes Absolute 1 67 Thousands/µL      Monocytes Absolute 1 03 Thousand/µL      Eosinophils Absolute 0 03 Thousand/µL      Basophils Absolute 0 03 Thousands/µL     Rapid drug screen, urine [[id number]]     Lab Status: No result Specimen: Urine                  CT head without contrast   Final Result by Erica Canavan, MD (06/04 7521)      No acute intracranial abnormality  Workstation performed: [id number]                    Procedures  ECG 12 Lead Documentation Only    Date/Time: 6/4/2022 9:58 PM  Performed by: Oliva Mckeon MD  Authorized by: Oliva Mckeon MD     ECG reviewed by me, the ED Provider: yes    Patient location:  ED  Previous ECG:     Previous ECG:  Compared to current    Comparison ECG info:  2/7/18    Similarity:  No change    Comparison to cardiac monitor: Yes    Interpretation:     Interpretation: non-specific    Rate:     ECG rate:  107    ECG rate assessment: tachycardic    Rhythm:     Rhythm: sinus tachycardia    Ectopy:     Ectopy: none    QRS:     QRS axis:  Normal    QRS intervals:  Normal  Conduction:     Conduction: normal    ST segments:     ST segments:  Normal  T waves:     T waves: flattening               ED Course                               SBIRT 20yo+    Flowsheet Row Most Recent Value   SBIRT (25 yo +)    In order to provide better care to our patients, we are screening all of our patients for alcohol and drug use  Would it be okay to ask you these screening questions? No Filed at: 06/04/2022 2119                    [facility]  Number of Diagnoses or Management Options  Diagnosis management comments: Will check labs, EKG  Coma panel  IV fluids        Disposition  Final diagnoses:   Encephalopathy     Time reflects when diagnosis was documented in both MDM as applicable and the Disposition within this note     Time User Action Codes Description Comment    6/5/2022 12:30 AM Oliva Hidalgo Add [G93 40] Encephalopathy       ED Disposition     ED Disposition   Admit    Condition   Stable    Date/Time   Sun Jun 5, 2022 12:30 AM    Comment              Follow-up Information    None         Patient's Medications   Discharge Prescriptions    No medications on file       No discharge procedures on file      PDMP Review     None          ED Provider  Electronically Signed by           Kaiden Klein MD  06/05/22 4371

## 2022-06-06 VITALS
HEART RATE: 95 BPM | TEMPERATURE: 97.8 F | DIASTOLIC BLOOD PRESSURE: 77 MMHG | RESPIRATION RATE: 18 BRPM | SYSTOLIC BLOOD PRESSURE: 111 MMHG | OXYGEN SATURATION: 94 %

## 2022-06-06 LAB
ANION GAP SERPL CALCULATED.3IONS-SCNC: 8 MMOL/L (ref 4–13)
BASOPHILS # BLD AUTO: 0.02 THOUSANDS/ΜL (ref 0–0.1)
BASOPHILS NFR BLD AUTO: 0 % (ref 0–1)
BUN SERPL-MCNC: 7 MG/DL (ref 5–25)
CALCIUM SERPL-MCNC: 7.6 MG/DL (ref 8.3–10.1)
CHLORIDE SERPL-SCNC: 106 MMOL/L (ref 100–108)
CO2 SERPL-SCNC: 24 MMOL/L (ref 21–32)
CREAT SERPL-MCNC: 0.86 MG/DL (ref 0.6–1.3)
EOSINOPHIL # BLD AUTO: 0.12 THOUSAND/ΜL (ref 0–0.61)
EOSINOPHIL NFR BLD AUTO: 1 % (ref 0–6)
ERYTHROCYTE [DISTWIDTH] IN BLOOD BY AUTOMATED COUNT: 13.5 % (ref 11.6–15.1)
GFR SERPL CREATININE-BSD FRML MDRD: 80 ML/MIN/1.73SQ M
GLUCOSE P FAST SERPL-MCNC: 99 MG/DL (ref 65–99)
GLUCOSE SERPL-MCNC: 99 MG/DL (ref 65–140)
HCT VFR BLD AUTO: 34.7 % (ref 34.8–46.1)
HGB BLD-MCNC: 11.4 G/DL (ref 11.5–15.4)
IMM GRANULOCYTES # BLD AUTO: 0.09 THOUSAND/UL (ref 0–0.2)
IMM GRANULOCYTES NFR BLD AUTO: 1 % (ref 0–2)
LYMPHOCYTES # BLD AUTO: 1.35 THOUSANDS/ΜL (ref 0.6–4.47)
LYMPHOCYTES NFR BLD AUTO: 8 % (ref 14–44)
MCH RBC QN AUTO: 31.2 PG (ref 26.8–34.3)
MCHC RBC AUTO-ENTMCNC: 32.9 G/DL (ref 31.4–37.4)
MCV RBC AUTO: 95 FL (ref 82–98)
MONOCYTES # BLD AUTO: 1 THOUSAND/ΜL (ref 0.17–1.22)
MONOCYTES NFR BLD AUTO: 6 % (ref 4–12)
NEUTROPHILS # BLD AUTO: 13.83 THOUSANDS/ΜL (ref 1.85–7.62)
NEUTS SEG NFR BLD AUTO: 84 % (ref 43–75)
NRBC BLD AUTO-RTO: 0 /100 WBCS
PLATELET # BLD AUTO: 187 THOUSANDS/UL (ref 149–390)
PMV BLD AUTO: 10.9 FL (ref 8.9–12.7)
POTASSIUM SERPL-SCNC: 3.7 MMOL/L (ref 3.5–5.3)
RBC # BLD AUTO: 3.65 MILLION/UL (ref 3.81–5.12)
SODIUM SERPL-SCNC: 138 MMOL/L (ref 136–145)
WBC # BLD AUTO: 16.41 THOUSAND/UL (ref 4.31–10.16)

## 2022-06-06 PROCEDURE — 85025 COMPLETE CBC W/AUTO DIFF WBC: CPT | Performed by: STUDENT IN AN ORGANIZED HEALTH CARE EDUCATION/TRAINING PROGRAM

## 2022-06-06 PROCEDURE — 80048 BASIC METABOLIC PNL TOTAL CA: CPT | Performed by: STUDENT IN AN ORGANIZED HEALTH CARE EDUCATION/TRAINING PROGRAM

## 2022-06-06 PROCEDURE — 99217 PR OBSERVATION CARE DISCHARGE MANAGEMENT: CPT | Performed by: INTERNAL MEDICINE

## 2022-06-06 RX ADMIN — ACETAMINOPHEN 975 MG: 325 TABLET, FILM COATED ORAL at 04:22

## 2022-06-06 NOTE — PLAN OF CARE
Problem: MOBILITY - ADULT  Goal: Maintain or return to baseline ADL function  Description: INTERVENTIONS:  -  Assess patient's ability to carry out ADLs; assess patient's baseline for ADL function and identify physical deficits which impact ability to perform ADLs (bathing, care of mouth/teeth, toileting, grooming, dressing, etc )  - Assess/evaluate cause of self-care deficits   - Assess range of motion  - Assess patient's mobility; develop plan if impaired  - Assess patient's need for assistive devices and provide as appropriate  - Encourage maximum independence but intervene and supervise when necessary  - Involve family in performance of ADLs  - Assess for home care needs following discharge   - Consider OT consult to assist with ADL evaluation and planning for discharge  - Provide patient education as appropriate  Outcome: Progressing  Goal: Maintains/Returns to pre admission functional level  Description: INTERVENTIONS:  - Perform BMAT or MOVE assessment daily    - Set and communicate daily mobility goal to care team and patient/family/caregiver  - Collaborate with rehabilitation services on mobility goals if consulted  - Perform Range of Motion  times a day  - Reposition patient every  hours    - Dangle patient  times a day  - Stand patient  times a day  - Ambulate patient  times a day  - Out of bed to chair  times a day   - Out of bed for meals  times a day  - Out of bed for toileting  - Record patient progress and toleration of activity level   Outcome: Progressing     Problem: Potential for Falls  Goal: Patient will remain free of falls  Description: INTERVENTIONS:  - Educate patient/family on patient safety including physical limitations  - Instruct patient to call for assistance with activity   - Consult OT/PT to assist with strengthening/mobility   - Keep Call bell within reach  - Keep bed low and locked with side rails adjusted as appropriate  - Keep care items and personal belongings within reach  - Initiate and maintain comfort rounds  - Make Fall Risk Sign visible to staff  - Offer Toileting every Hours, in advance of need  - Initiate/Maintain alarm  - Obtain necessary fall risk management equipment:   - Apply yellow socks and bracelet for high fall risk patients  - Consider moving patient to room near nurses station  Outcome: Progressing     Problem: Prexisting or High Potential for Compromised Skin Integrity  Goal: Skin integrity is maintained or improved  Description: INTERVENTIONS:  - Identify patients at risk for skin breakdown  - Assess and monitor skin integrity  - Assess and monitor nutrition and hydration status  - Monitor labs   - Assess for incontinence   - Turn and reposition patient  - Assist with mobility/ambulation  - Relieve pressure over bony prominences  - Avoid friction and shearing  - Provide appropriate hygiene as needed including keeping skin clean and dry  - Evaluate need for skin moisturizer/barrier cream  - Collaborate with interdisciplinary team   - Patient/family teaching  - Consider wound care consult   Outcome: Progressing

## 2022-06-06 NOTE — DISCHARGE INSTRUCTIONS
Dear Gavin Castro,     It was our pleasure to care for you here at [facility], [facility]  It is our hope that we were always able to exceed the expected standards for your care during your stay  You were hospitalized due acute encephalopathy related to sedating medications   You were cared for on the 2nd floor under the service of Raj Harris, DO with the [facility] who covers for your primary care physician (PCP), Lindsey Ferrara, while you were hospitalized  If you have any questions or concerns related to this hospitalization, you may contact us at [phone]  For follow up as well as medication refills, we recommend that you follow up with your primary care physician  A registered nurse will reach out to you by phone within a few days after your discharge to answer any additional questions that you may have after going home  However, at this time we provide for you here, the most important instructions / recommendations at discharge:     Notable Medication Adjustments -   You are on several medications that can lead to lethargy  Seroquel, clonazepam, Ambien, Flexeril can all contribute to sedation  Please continue your Seroquel and Cymbalta at current doses  I would discontinue Ambien and Flexeril at this time  Continue clonazepam only as needed  Please follow-up with your psychiatrist to discuss further management of your home medications  Testing Required after Discharge -   CBC in 1 week   Incidental findings that Require Follow Up   No incidental findings  Important Follow Up Information -   Please follow-up with PCP in 1 week and psychiatrist as soon as possible  Please review this entire after visit summary as additional general instructions including medication list, appointments, activity, diet, any pertinent wound care, and other additional recommendations from your care team that may be provided for you        Sincerely, Ester Odom, DO

## 2022-06-06 NOTE — DISCHARGE SUMMARY
[address] [facility]  Discharge- Princess Nicole 1975, 52 y o  female MRN: [id number]  Unit/Bed#: [facility]: [address] [place] [address] 2 [facility] [id number] Encounter: [id number]  Primary Care Provider: Milagro Marshall   Date and time admitted to hospital: 6/4/2022  9:04 PM    * Acute encephalopathy  Assessment & Plan  Patient brought in by family secondary to lethargy  Patient reports taking extra doses of Seroquel as she had not been sleeping over several days  No suicidal or homicidal ideations  Lethargy likely related to polypharmacy  CT head unremarkable  UDS on admission positive for benzodiazepines  Patient is on clonazepam at home  Psychiatry recommends continuing Seroquel and Cymbalta  On day of discharge, patient back to baseline  I recommended discontinuing Ambien on discharge  Only use clonazepam p r n  Follow-up with Psychiatry for ongoing titration of medications      SIRS (systemic inflammatory response syndrome) (HCC)  Assessment & Plan  SIRS POA with tachycardia and leukocytosis  Likely stress-induced  Urinalysis unremarkable  Procalcitonin negative  Afebrile  Recheck CBC in 1 week to ensure resolution    Mild intermittent asthma without complication  Assessment & Plan  No acute asthma exacerbation   Not on maintenance inhaler    Severe episode of recurrent major depressive disorder, with psychotic features ([facility] [address] )  Assessment & Plan  Maintained on Cymbalta and Seroquel  Close follow-up with Psychiatry    Acute kidney injury ([facility] [address] )  Assessment & Plan  Creatinine 1 3 on admission, baseline 0 7-0 9  Resolved with IV fluids    Pituitary mass [facility])  Assessment & Plan  History of pituitary mass, followed outpatient by Neurosurgery  Stable mass noted on MRI in May  Continue outpatient follow-up with Neurosurgery    Discharging Physician / Practitioner: Melina Rodríguez DO  PCP: Curvin Oats  Admission Date:   Admission Orders (From admission, onward)     Ordered        06/05/22 0030  Place in Observation  Once Discharge Date: 06/06/22    Medical Problems             Resolved Problems  Date Reviewed: 6/5/2022   None                 Consultations During Hospital Stay: Psychiatry     Procedures Performed: None    Significant Findings / Test Results:     CT head without contrast    Result Date: 6/4/2022  Impression: No acute intracranial abnormality  Workstation performed: [id number]       Incidental Findings: None    Test Results Pending at Discharge (will require follow up): None     Outpatient Tests Requested: CBC    Reason for Admission: [address] Course:     Jerad Landrum is a 52 y o  female With past medical history of moderate depressive disorder who presented the hospital with acute encephalopathy and DANNY in setting of polypharmacy  Patient's DANNY resolved with IV fluids  Patient's mental status returned to baseline  She was seen by Psychiatry who provided recommendations for discharge  Please see above list of diagnoses and related plan for additional information  Condition at Discharge: stable     Discharge Day Visit / Exam:     Subjective:   Patient seen and evaluated at bedside  She feels well  We discuss plan for discharge  Vitals: Blood Pressure: 111/77 (06/06/22 0710)  Pulse: 95 (06/06/22 0710)  Temperature: 97 8 °F (36 6 °C) (06/06/22 0710)  Temp Source: Oral (06/04/22 2109)  Respirations: 18 (06/05/22 2335)  SpO2: 94 % (06/06/22 0710)  Exam:   Physical Exam  Vitals reviewed  Constitutional:       General: She is not in acute distress  Appearance: She is well-developed  She is not ill-appearing, toxic-appearing or diaphoretic  HENT:      Head: Normocephalic and atraumatic  Mouth/Throat:      Mouth: Mucous membranes are moist       Pharynx: No oropharyngeal exudate  Eyes:      General: No scleral icterus  Extraocular Movements: Extraocular movements intact        Conjunctiva/sclera: Conjunctivae normal    Cardiovascular:      Rate and Rhythm: Normal rate and regular rhythm  Heart sounds: Normal heart sounds  Pulmonary:      Effort: Pulmonary effort is normal  No respiratory distress  Breath sounds: Normal breath sounds  No wheezing or rales  Abdominal:      General: There is no distension  Palpations: Abdomen is soft  Tenderness: There is no abdominal tenderness  There is no guarding or rebound  Musculoskeletal:         General: No swelling, tenderness or deformity  Skin:     General: Skin is warm and dry  Neurological:      General: No focal deficit present  Mental Status: She is alert  Mental status is at baseline  Psychiatric:      Comments: Flat affect           Discharge instructions/Information to patient and family:   See after visit summary for information provided to patient and family  Provisions for Follow-Up Care:  See after visit summary for information related to follow-up care and any pertinent home health orders  Disposition:     Home     Discharge Statement:  I spent 60 minutes discharging the patient  This time was spent on the day of discharge  I had direct contact with the patient on the day of discharge  Greater than 50% of the total time was spent examining patient, answering all patient questions, arranging and discussing plan of care with patient as well as directly providing post-discharge instructions  Additional time then spent on discharge activities  Discharge Medications:  See after visit summary for reconciled discharge medications provided to patient and family        ** Please Note: This note has been constructed using a voice recognition system **

## 2022-06-06 NOTE — NURSING NOTE
Discharge paperwork and meds reviewed with patient  Answered all questions and concerns  IVs and masimo removed  Daughter to provide transportation home
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
## 2022-06-06 NOTE — UTILIZATION REVIEW
Initial Clinical Review    Admission: Date/Time/Statement:   Admission Orders (From admission, onward)     Ordered        06/05/22 0030  Place in Observation  Once                      Orders Placed This Encounter   Procedures    Place in Observation     Standing Status:   Standing     Number of Occurrences:   1     Order Specific Question:   Level of Care     Answer:   Med Surg [16]     ED Arrival Information     Expected   -    Arrival   6/4/2022 21:04    Acuity   Emergent            Means of arrival   Stretcher    Escorted by   [facility] ([address])    Service   Hospitalist    Admission type   Emergency            Arrival complaint   overdose           Chief Complaint   Patient presents with    Medical Problem     Pt arrived via [facility] from home per EMS pt's family called due to pt found unconscious possibly took an unknown amount of seroquel  Pt was given 2mg narcan PTA  Initial Presentation: 52 y o  female , presented to the ED @ [facility], from home via EMS  Admitted as Observation due to Acute Encephalopathy / Overdose  Date: 06/05/2022    Per spouse she woke up this morning at 6a  He saw her in the kitchen washing dishes and asked her why she is awake so early and suggested she go back to bed  She came back to the bedroom and said she will take 2 seroquel tablets and sleep  Reports she has been asleep since  Throughout the day he attempted to wake her up, she awoke to name, stared at him, closed her eyes and fell asleep  He became concerned called EMS  CT head unremarkable  Coma panel negative  UDS: pending  Hold seroquel, clonazepam, Cymbalta, hydroxyzine and cyclobenzapine  Consult Psych  IV flds  Monitor off ABX  Repeat BMP in AM        06/05/2022 Consult Psych:  PMH significant for MDD, Severe, With Psychotic Features that presented with AMS  Patient's AMS likely related to polypharmacy (UDS+ for BDZ) in the setting of insomnia   Patient is progressing well and recommend restarting home regimen of Duloxetine 60 mg qam and Seroquel 600 mg qhs   Patient is safe for discharge home and does not require voluntary or involuntary psychiatric admission nor 1:1      VTE Prophylaxis: low risk  / reason for no mechanical VTE prophylaxis ambulate       ED Triage Vitals   Temperature Pulse Respirations Blood Pressure SpO2   06/04/22 2109 06/04/22 2109 06/04/22 2109 06/04/22 2109 06/04/22 2109   98 6 °F (37 °C) (!) 112 14 98/68 98 %      Temp Source Heart Rate Source Patient Position - Orthostatic VS BP Location FiO2 (%)   06/04/22 2109 06/04/22 2109 06/04/22 2109 06/04/22 2109 --   Oral Monitor Lying Right arm       Pain Score       06/05/22 0900       No Pain          Wt Readings from Last 1 Encounters:   05/31/22 83 5 kg (184 lb)     Additional Vital Signs:   Date/Time Temp Pulse Resp BP MAP (mmHg) SpO2 O2 Device Patient Position - Orthostatic VS   06/06/22 0742 -- -- -- -- -- -- None (Room air) --   06/06/22 07:10:34 97 8 °F (36 6 °C) 95 -- 111/77 88 94 % -- --   06/05/22 23:35:55 99 5 °F (37 5 °C) 92 18 116/82 93 91 % -- --   06/05/22 15:12:20 97 1 °F (36 2 °C) Abnormal  97 15 115/82 93 94 % -- --   06/05/22 0900 -- -- -- -- -- -- None (Room air) --   06/05/22 07:21:46 96 8 °F (36 °C) Abnormal  99 15 115/82 93 93 % -- --   06/05/22 02:14:55 96 4 °F (35 8 °C) Abnormal  102 20 141/92 108 94 % -- --   06/05/22 0150 -- 101 18 123/90 -- 94 % None (Room air) Lying   06/05/22 0054 -- -- -- -- -- -- None (Room air) --   06/05/22 0045 -- 100 18 125/81 94 95 % None (Room air) --   06/05/22 0044 -- 100 16 125/81 -- 95 % None (Room air) Lying   06/04/22 2245 -- 104 14 122/78 91 96 % -- --   06/04/22 2230 -- 104 14 124/82 95 94 % None (Room air) --   06/04/22 2215 -- 106 Abnormal  -- 133/84 97 93 % None (Room air) Sitting   06/04/22 2200 -- 106 Abnormal  -- 141/91 107 95 % -- --   06/04/22 2145 -- 106 Abnormal  14 133/81 94 95 % None (Room air) Sitting   06/04/22 2130 -- 108 Abnormal  -- 130/80 101 97 % None (Room air) Sitting     Date and Time Eye Opening Best Verbal Response Best Motor Response Kaiden Coma Scale Score   22 0742 4 5 6 15   22 1954 3 5 6 14   22 0900 3 5 5 13   22 0150 3 2 5 10   22 0000 2 2 5 9   22 2120 3 4 5 12     2022 @ 2142  EC, Sinus Tachycardia    Pertinent Labs/Diagnostic Test Results:   CT head without contrast   Final Result by Mabel Cardenas MD ([id number])   No acute intracranial abnormality          Results from last 7 days   Lab Units 22  0542 22   WBC Thousand/uL 16 41* 17 59* 14 39*   HEMOGLOBIN g/dL 11 4* 12 1 12 3   HEMATOCRIT % 34 7* 37 0 37 3   PLATELETS Thousands/uL 187 220 258   NEUTROS ABS Thousands/µL 13 83* 15 09* 11 53*     Results from last 7 days   Lab Units 22  0542 22   SODIUM mmol/L 138 138 138   POTASSIUM mmol/L 3 7 4 1 4 3   CHLORIDE mmol/L 106 105 102   CO2 mmol/L 24 25 26   ANION GAP mmol/L 8 8 10   BUN mg/dL 7 14 15   CREATININE mg/dL 0 86 1 13 1 33*   EGFR ml/min/1 73sq m 80 58 47   CALCIUM mg/dL 7 6* 7 5* 8 7     Results from last 7 days   Lab Units 22  0542 22   AST U/L 120* 84*   ALT U/L 33 27   ALK PHOS U/L 60 69   TOTAL PROTEIN g/dL 6 1* 6 6   ALBUMIN g/dL 2 7* 3 4*   TOTAL BILIRUBIN mg/dL 0 35 0 40     Results from last 7 days   Lab Units 22   POC GLUCOSE mg/dl 126     Results from last 7 days   Lab Units 22  0542 22   GLUCOSE RANDOM mg/dL 99 101 95     Results from last 7 days   Lab Units 22   PH JEFFRY  7 400   PCO2 JEFFRY mm Hg 39 1*   PO2 JEFFRY mm Hg 78 4*   HCO3 JEFFRY mmol/L 23 7*   BASE EXC JEFFRY mmol/L -0 9   O2 CONTENT JEFFRY ml/dL 17 6   O2 HGB, VENOUS % 93 7*     Results from last 7 days   Lab Units 22   PROTIME seconds 14 3   INR  1 13   PTT seconds 29     Results from last 7 days   Lab Units 22   PROCALCITONIN ng/ml <0 05     Results from last 7 days   Lab Units 06/05/22  0147   CLARITY UA  Clear   COLOR UA  Yellow   SPEC GRAV UA  >=1 030   PH UA  6 0   GLUCOSE UA mg/dl Negative   KETONES UA mg/dl Negative   BLOOD UA  Large*   PROTEIN UA mg/dl 100 (2+)*   NITRITE UA  Negative   BILIRUBIN UA  Negative   UROBILINOGEN UA E U /dl 0 2   LEUKOCYTES UA  Negative   WBC UA /hpf 2-4   RBC UA /hpf 4-10*   BACTERIA UA /hpf Occasional   EPITHELIAL CELLS WET PREP /hpf Occasional     Results from last 7 days   Lab Units 06/05/22  0146   AMPH/METH  Negative   BARBITURATE UR  Negative   BENZODIAZEPINE UR  Positive*   COCAINE UR  Negative   METHADONE URINE  Negative   OPIATE UR  Negative   PCP UR  Negative   THC UR  Negative     Results from last 7 days   Lab Units 06/04/22  2122   ETHANOL LVL mg/dL <3   ACETAMINOPHEN LVL ug/mL <2*   SALICYLATE LVL mg/dL <3*     ED Treatment:   Medication Administration from 06/04/2022 2104 to 06/05/2022 0210       Date/Time Order Dose Route Action     06/04/2022 2119 naloxone (FOR EMS ONLY) [facility]) 2 MG/2ML injection 2 mg 0 mg Does not apply Given to EMS     06/04/2022 2125 sodium chloride 0 9 % bolus 1,000 mL 1,000 mL Intravenous New Bag        Past Medical History:   Diagnosis Date    Asthma     Migraine     Pituitary tumor     Psychiatric disorder     Depression     Present on Admission:   Acute encephalopathy   Acute kidney injury ([address] )   Pituitary mass (HCC)   Severe episode of recurrent major depressive disorder, with psychotic features ([address] )   Mild intermittent asthma without complication   SIRS (systemic inflammatory response syndrome) (HCC)      Admitting Diagnosis: Overdose [T50 901A]  Encephalopathy [G93 40]  Severe episode of recurrent major depressive disorder, with psychotic features ([address] ) [F33 3]  Age/Sex: 52 y o  female  Admission Orders:  Telemetry  Regular diet  Up with assistance / Ambulate QID    Scheduled Medications:  cholecalciferol, 2,000 Units, Oral, Daily      Continuous IV Infusions:     PRN Meds:  acetaminophen, 975 mg, Oral, Q6H PRN  aluminum-magnesium hydroxide-simethicone, 30 mL, Oral, Q6H PRN  ondansetron, 4 mg, Intravenous, Q6H PRN  simethicone, 80 mg, Oral, 4x Daily PRN        IP CONSULT TO PSYCHIATRY    Network Utilization Review Department  ATTENTION: Please call with any questions or concerns to [phone] and carefully listen to the prompts so that you are directed to the right person  All voicemails are confidential   Marcy Sosa all requests for admission clinical reviews, approved or denied determinations and any other requests to dedicated fax number below belonging to the campus where the patient is receiving treatment   List of dedicated fax numbers for the Facilities:  [address] DENIALS (Administrative/Medical Necessity) [phone]   [address] (Maternity/NICU/Pediatrics) [phone]   [address]  [address] [id number] [address] [phone]

## 2022-06-17 ENCOUNTER — APPOINTMENT (EMERGENCY)
Dept: CT IMAGING | Facility: HOSPITAL | Age: 47
DRG: 817 | End: 2022-06-17
Payer: COMMERCIAL

## 2022-06-17 ENCOUNTER — HOSPITAL ENCOUNTER (INPATIENT)
Facility: HOSPITAL | Age: 47
LOS: 1 days | DRG: 817 | End: 2022-06-20
Attending: EMERGENCY MEDICINE | Admitting: STUDENT IN AN ORGANIZED HEALTH CARE EDUCATION/TRAINING PROGRAM
Payer: COMMERCIAL

## 2022-06-17 DIAGNOSIS — F33.3 SEVERE EPISODE OF RECURRENT MAJOR DEPRESSIVE DISORDER, WITH PSYCHOTIC FEATURES (HCC): Chronic | ICD-10-CM

## 2022-06-17 DIAGNOSIS — T50.902A INTENTIONAL DRUG OVERDOSE, INITIAL ENCOUNTER (HCC): ICD-10-CM

## 2022-06-17 DIAGNOSIS — T50.901A OVERDOSE: Primary | ICD-10-CM

## 2022-06-17 LAB
ALBUMIN SERPL BCP-MCNC: 3 G/DL (ref 3.5–5)
ALP SERPL-CCNC: 88 U/L (ref 46–116)
ALT SERPL W P-5'-P-CCNC: 25 U/L (ref 12–78)
AMMONIA PLAS-SCNC: 16 UMOL/L (ref 11–35)
AMPHETAMINES SERPL QL SCN: NEGATIVE
ANION GAP SERPL CALCULATED.3IONS-SCNC: 10 MMOL/L (ref 4–13)
APAP SERPL-MCNC: <2 UG/ML (ref 10–20)
APTT PPP: 31 SECONDS (ref 23–37)
AST SERPL W P-5'-P-CCNC: 19 U/L (ref 5–45)
ATRIAL RATE: 100 BPM
BARBITURATES UR QL: NEGATIVE
BASOPHILS # BLD AUTO: 0.02 THOUSANDS/ΜL (ref 0–0.1)
BASOPHILS NFR BLD AUTO: 0 % (ref 0–1)
BENZODIAZ UR QL: POSITIVE
BILIRUB SERPL-MCNC: 0.19 MG/DL (ref 0.2–1)
BILIRUB UR QL STRIP: NEGATIVE
BUN SERPL-MCNC: 13 MG/DL (ref 5–25)
CALCIUM ALBUM COR SERPL-MCNC: 9.2 MG/DL (ref 8.3–10.1)
CALCIUM SERPL-MCNC: 8.4 MG/DL (ref 8.3–10.1)
CHLORIDE SERPL-SCNC: 102 MMOL/L (ref 100–108)
CK SERPL-CCNC: 96 U/L (ref 26–192)
CLARITY UR: CLEAR
CO2 SERPL-SCNC: 26 MMOL/L (ref 21–32)
COCAINE UR QL: NEGATIVE
COLOR UR: YELLOW
CREAT SERPL-MCNC: 1.07 MG/DL (ref 0.6–1.3)
EOSINOPHIL # BLD AUTO: 0.14 THOUSAND/ΜL (ref 0–0.61)
EOSINOPHIL NFR BLD AUTO: 2 % (ref 0–6)
ERYTHROCYTE [DISTWIDTH] IN BLOOD BY AUTOMATED COUNT: 12.4 % (ref 11.6–15.1)
ETHANOL SERPL-MCNC: <3 MG/DL (ref 0–3)
FLUAV RNA RESP QL NAA+PROBE: NEGATIVE
FLUBV RNA RESP QL NAA+PROBE: NEGATIVE
GFR SERPL CREATININE-BSD FRML MDRD: 61 ML/MIN/1.73SQ M
GLUCOSE SERPL-MCNC: 120 MG/DL (ref 65–140)
GLUCOSE SERPL-MCNC: 140 MG/DL (ref 65–140)
GLUCOSE UR STRIP-MCNC: NEGATIVE MG/DL
HCT VFR BLD AUTO: 36.6 % (ref 34.8–46.1)
HGB BLD-MCNC: 12 G/DL (ref 11.5–15.4)
HGB UR QL STRIP.AUTO: NEGATIVE
IMM GRANULOCYTES # BLD AUTO: 0.01 THOUSAND/UL (ref 0–0.2)
IMM GRANULOCYTES NFR BLD AUTO: 0 % (ref 0–2)
INR PPP: 1 (ref 0.84–1.19)
KETONES UR STRIP-MCNC: NEGATIVE MG/DL
LEUKOCYTE ESTERASE UR QL STRIP: NEGATIVE
LYMPHOCYTES # BLD AUTO: 1.33 THOUSANDS/ΜL (ref 0.6–4.47)
LYMPHOCYTES NFR BLD AUTO: 22 % (ref 14–44)
MCH RBC QN AUTO: 31.2 PG (ref 26.8–34.3)
MCHC RBC AUTO-ENTMCNC: 32.8 G/DL (ref 31.4–37.4)
MCV RBC AUTO: 95 FL (ref 82–98)
METHADONE UR QL: NEGATIVE
MONOCYTES # BLD AUTO: 0.33 THOUSAND/ΜL (ref 0.17–1.22)
MONOCYTES NFR BLD AUTO: 6 % (ref 4–12)
NEUTROPHILS # BLD AUTO: 4.12 THOUSANDS/ΜL (ref 1.85–7.62)
NEUTS SEG NFR BLD AUTO: 70 % (ref 43–75)
NITRITE UR QL STRIP: NEGATIVE
NRBC BLD AUTO-RTO: 0 /100 WBCS
OPIATES UR QL SCN: NEGATIVE
OXYCODONE+OXYMORPHONE UR QL SCN: NEGATIVE
P AXIS: 74 DEGREES
PCP UR QL: NEGATIVE
PH UR STRIP.AUTO: 6 [PH] (ref 4.5–8)
PLATELET # BLD AUTO: 351 THOUSANDS/UL (ref 149–390)
PMV BLD AUTO: 9.8 FL (ref 8.9–12.7)
POTASSIUM SERPL-SCNC: 3.7 MMOL/L (ref 3.5–5.3)
PR INTERVAL: 166 MS
PROT SERPL-MCNC: 6.5 G/DL (ref 6.4–8.2)
PROT UR STRIP-MCNC: NEGATIVE MG/DL
PROTHROMBIN TIME: 13 SECONDS (ref 11.6–14.5)
QRS AXIS: 40 DEGREES
QRSD INTERVAL: 88 MS
QT INTERVAL: 312 MS
QTC INTERVAL: 402 MS
RBC # BLD AUTO: 3.85 MILLION/UL (ref 3.81–5.12)
RSV RNA RESP QL NAA+PROBE: NEGATIVE
SALICYLATES SERPL-MCNC: <3 MG/DL (ref 3–20)
SARS-COV-2 RNA RESP QL NAA+PROBE: NEGATIVE
SODIUM SERPL-SCNC: 138 MMOL/L (ref 136–145)
SP GR UR STRIP.AUTO: 1.01 (ref 1–1.03)
T WAVE AXIS: -23 DEGREES
THC UR QL: NEGATIVE
TSH SERPL DL<=0.05 MIU/L-ACNC: 7.82 UIU/ML (ref 0.45–4.5)
UROBILINOGEN UR QL STRIP.AUTO: 0.2 E.U./DL
VENTRICULAR RATE: 100 BPM
WBC # BLD AUTO: 5.95 THOUSAND/UL (ref 4.31–10.16)

## 2022-06-17 PROCEDURE — 82550 ASSAY OF CK (CPK): CPT | Performed by: EMERGENCY MEDICINE

## 2022-06-17 PROCEDURE — 85025 COMPLETE CBC W/AUTO DIFF WBC: CPT | Performed by: EMERGENCY MEDICINE

## 2022-06-17 PROCEDURE — 82077 ASSAY SPEC XCP UR&BREATH IA: CPT | Performed by: EMERGENCY MEDICINE

## 2022-06-17 PROCEDURE — 70450 CT HEAD/BRAIN W/O DYE: CPT

## 2022-06-17 PROCEDURE — 99285 EMERGENCY DEPT VISIT HI MDM: CPT | Performed by: EMERGENCY MEDICINE

## 2022-06-17 PROCEDURE — 84443 ASSAY THYROID STIM HORMONE: CPT | Performed by: EMERGENCY MEDICINE

## 2022-06-17 PROCEDURE — 82140 ASSAY OF AMMONIA: CPT | Performed by: EMERGENCY MEDICINE

## 2022-06-17 PROCEDURE — 82948 REAGENT STRIP/BLOOD GLUCOSE: CPT

## 2022-06-17 PROCEDURE — 0241U HB NFCT DS VIR RESP RNA 4 TRGT: CPT | Performed by: EMERGENCY MEDICINE

## 2022-06-17 PROCEDURE — 80143 DRUG ASSAY ACETAMINOPHEN: CPT | Performed by: EMERGENCY MEDICINE

## 2022-06-17 PROCEDURE — 99449 NTRPROF PH1/NTRNET/EHR 31/>: CPT | Performed by: EMERGENCY MEDICINE

## 2022-06-17 PROCEDURE — 80307 DRUG TEST PRSMV CHEM ANLYZR: CPT | Performed by: EMERGENCY MEDICINE

## 2022-06-17 PROCEDURE — 85610 PROTHROMBIN TIME: CPT | Performed by: EMERGENCY MEDICINE

## 2022-06-17 PROCEDURE — 36415 COLL VENOUS BLD VENIPUNCTURE: CPT | Performed by: EMERGENCY MEDICINE

## 2022-06-17 PROCEDURE — 99220 PR INITIAL OBSERVATION CARE/DAY 70 MINUTES: CPT | Performed by: STUDENT IN AN ORGANIZED HEALTH CARE EDUCATION/TRAINING PROGRAM

## 2022-06-17 PROCEDURE — 93010 ELECTROCARDIOGRAM REPORT: CPT | Performed by: INTERNAL MEDICINE

## 2022-06-17 PROCEDURE — 80053 COMPREHEN METABOLIC PANEL: CPT | Performed by: EMERGENCY MEDICINE

## 2022-06-17 PROCEDURE — 80179 DRUG ASSAY SALICYLATE: CPT | Performed by: EMERGENCY MEDICINE

## 2022-06-17 PROCEDURE — 85730 THROMBOPLASTIN TIME PARTIAL: CPT | Performed by: EMERGENCY MEDICINE

## 2022-06-17 PROCEDURE — 93005 ELECTROCARDIOGRAM TRACING: CPT

## 2022-06-17 PROCEDURE — 99285 EMERGENCY DEPT VISIT HI MDM: CPT

## 2022-06-17 PROCEDURE — 81003 URINALYSIS AUTO W/O SCOPE: CPT

## 2022-06-17 RX ORDER — ACETAMINOPHEN 325 MG/1
650 TABLET ORAL EVERY 6 HOURS PRN
Status: DISCONTINUED | OUTPATIENT
Start: 2022-06-17 | End: 2022-06-20 | Stop reason: HOSPADM

## 2022-06-17 RX ORDER — ONDANSETRON 2 MG/ML
4 INJECTION INTRAMUSCULAR; INTRAVENOUS EVERY 6 HOURS PRN
Status: DISCONTINUED | OUTPATIENT
Start: 2022-06-17 | End: 2022-06-20 | Stop reason: HOSPADM

## 2022-06-17 RX ORDER — SODIUM CHLORIDE 9 MG/ML
75 INJECTION, SOLUTION INTRAVENOUS CONTINUOUS
Status: DISCONTINUED | OUTPATIENT
Start: 2022-06-17 | End: 2022-06-18

## 2022-06-17 RX ORDER — NALOXONE HYDROCHLORIDE 1 MG/ML
1 INJECTION PARENTERAL ONCE
Status: COMPLETED | OUTPATIENT
Start: 2022-06-17 | End: 2022-06-17

## 2022-06-17 RX ADMIN — SODIUM CHLORIDE 75 ML/HR: 0.9 INJECTION, SOLUTION INTRAVENOUS at 16:20

## 2022-06-17 NOTE — H&P
[facility]  H&P- Shelva Quant 1975, 52 y o  female MRN: [id number]  Unit/Bed#: [address] 203-01 Encounter: [id number]  Primary Care Provider: Josue Cheng   Date and time admitted to hospital: 6/17/2022  9:19 AM    Intentional drug overdose [facility])  Assessment & Plan  22-year-old female with past medical history of depression, asthma, pituitary mass presented with altered mental status, lethargy   reports that patient had been found after leaving patient alone for brief moment lethargic, obtunded with open bottle of pills  Suspect seroquel and ambien overdose, but also takes clonazepam and cymbalta  Toxicology recommending monitoring patient  Hold Seroquel, Cymbalta, Ambien and benzos at this time  Resume benzo but more awake to prevent withdrawals  Psych evaluation once more awake, patient may need inpatient psych placement  Continue one-to-one for intentional overdose        Severe episode of recurrent major depressive disorder, with psychotic features ([facility] [address] )  Assessment & Plan  Maintained on Cymbalta and Seroquel  Hold at this time given lethargy    Mild intermittent asthma without complication  Assessment & Plan  No acute asthma exacerbation  Not on maintenance inhaler    Pituitary mass [facility])  Assessment & Plan  History of pituitary mass, followed outpatient by Neurosurgery  Stable mass noted on MRI in May  Continue outpatient follow-up with Neurosurgery      VTE Prophylaxis: none, low risk  / sequential compression device   Code Status: FC  POLST: There is no POLST form on file for this patient (pre-hospital)  Discussion with family: patient,  bedside    Anticipated Length of Stay:  Patient will be admitted on an Observation basis with an anticipated length of stay of 2 midnights  Justification for Hospital Stay: Continue observation, 1:1 for suicide precautions, pending psych eval    Total Time for Visit, including Counseling / Coordination of Care: 45 minutes  Greater than 50% of this total time spent on direct patient counseling and coordination of care  Chief Complaint:   Lethargy    History of Present Illness:    Torsten Johnson is a 52 y o  female who presents with somnolence, lethargy  Patient past medical history of severe depression, pituitary mass, and asthma  Patient reportedly was found at home this morning, after her  left to get breakfast in bed somnolent with a bottle of pills open around her  There was concerns regarding intentional drug overdose, assessment reports patient has been battling depression  Patient was recently seen here about 2 weeks ago for similar incident, but previously was noted to be accidental   Labs have been unremarkable, pending UDS, UA  Her vitals have been stable, O2 sats in the upper 90s  Patient appears to be not in distress  Unable to give review of systems due to somnolence and lethargy  Most of history obtained from  at bedside  Review of Systems:    Review of Systems   Unable to perform ROS: Mental status change       Past Medical and Surgical History:     Past Medical History:   Diagnosis Date    Asthma     Migraine     Pituitary tumor     Psychiatric disorder     Depression       Past Surgical History:   Procedure Laterality Date    HYSTERECTOMY         Meds/Allergies:    Prior to Admission medications    Medication Sig Start Date End Date Taking?  Authorizing Provider   Cholecalciferol (Vitamin D3) 50 MCG (2000 UT) TABS Take 2,000 Units by mouth daily    Historical Provider, MD   clonazePAM (KlonoPIN) 2 mg tablet Take 1 mg by mouth 2 (two) times a day    Historical Provider, MD   DULoxetine (CYMBALTA) 20 mg capsule Take 60 mg by mouth daily    Historical Provider, MD   estradiol (ESTRACE) 1 mg tablet Take 2 mg by mouth daily 4/15/15   Historical Provider, MD   lidocaine (LIDODERM) 5 % Apply 1 patch topically daily Remove & Discard patch within 12 hours or as directed by MD   Placed on area of maximum discomfort 6/3/22   Elizabeth Olguin DO   naproxen (NAPROSYN) 500 mg tablet Take 1 tablet (500 mg total) by mouth every 12 (twelve) hours as needed for mild pain or moderate pain 6/3/22   Elizabeth Olguin DO   QUEtiapine Fumarate (SEROQUEL PO) Take 600 mg by mouth daily at bedtime      Historical Provider, MD     I have reviewed home medications with patient family member  Allergies: No Known Allergies    Social History:     Marital Status: Single   Occupation:   Patient Pre-hospital Living Situation: home  Patient Pre-hospital Level of Mobility: ambulatory  Patient Pre-hospital Diet Restrictions: none  Substance Use History:   Social History     Substance and Sexual Activity   Alcohol Use No     Social History     Tobacco Use   Smoking Status Never Smoker   Smokeless Tobacco Never Used     Social History     Substance and Sexual Activity   Drug Use No       Family History:    Family History   Problem Relation Age of Onset    No Known Problems Mother     Lymphoma Sister     No Known Problems Maternal Grandmother     No Known Problems Paternal Grandmother     No Known Problems Maternal Aunt     No Known Problems Maternal Aunt        Physical Exam:     Vitals:   Blood Pressure: 111/65 (06/17/22 1435)  Pulse: 92 (06/17/22 1435)  Temperature: (!) 96 4 °F (35 8 °C) (06/17/22 1435)  Temp Source: Axillary (06/17/22 1435)  Respirations: 18 (06/17/22 1435)  Height: 5' 2" (157 5 cm) (06/17/22 0932)  Weight - Scale: 82 3 kg (181 lb 7 oz) (06/17/22 0932)  SpO2: 96 % (06/17/22 1435)    Physical Exam  Vitals and nursing note reviewed  Constitutional:       General: She is not in acute distress  Appearance: Normal appearance  She is normal weight  She is not ill-appearing  Comments: lethargic   HENT:      Head: Normocephalic and atraumatic  Eyes:      Conjunctiva/sclera: Conjunctivae normal       Pupils: Pupils are equal, round, and reactive to light     Cardiovascular:      Rate and Rhythm: Normal rate  Pulses: Normal pulses  Pulmonary:      Effort: Pulmonary effort is normal  No respiratory distress  Breath sounds: No wheezing  Abdominal:      General: Bowel sounds are normal  There is no distension  Palpations: Abdomen is soft  Musculoskeletal:         General: No swelling  Right lower leg: No edema  Left lower leg: No edema  Skin:     General: Skin is warm and dry  Neurological:      General: No focal deficit present  Mental Status: Mental status is at baseline  Additional Data:     Lab Results: I have personally reviewed pertinent reports  Results from last 7 days   Lab Units 06/17/22  0946   WBC Thousand/uL 5 95   HEMOGLOBIN g/dL 12 0   HEMATOCRIT % 36 6   PLATELETS Thousands/uL 351   NEUTROS PCT % 70   LYMPHS PCT % 22   MONOS PCT % 6   EOS PCT % 2     Results from last 7 days   Lab Units 06/17/22  0946   SODIUM mmol/L 138   POTASSIUM mmol/L 3 7   CHLORIDE mmol/L 102   CO2 mmol/L 26   BUN mg/dL 13   CREATININE mg/dL 1 07   ANION GAP mmol/L 10   CALCIUM mg/dL 8 4   ALBUMIN g/dL 3 0*   TOTAL BILIRUBIN mg/dL 0 19*   ALK PHOS U/L 88   ALT U/L 25   AST U/L 19   GLUCOSE RANDOM mg/dL 140     Results from last 7 days   Lab Units 06/17/22  0946   INR  1 00     Results from last 7 days   Lab Units 06/17/22  0950   POC GLUCOSE mg/dl 120               Imaging: I have personally reviewed pertinent reports  CT head without contrast   Final Result by Vale Kitchen MD (06/17 1115)      No acute intracranial abnormality  Workstation performed: [id number]             EKG, Pathology, and Other Studies Reviewed on Admission:   · EKG: NSR,     Allscripts / Epic Records Reviewed: Yes     ** Please Note: This note has been constructed using a voice recognition system   **

## 2022-06-17 NOTE — CONSULTS
INTERPROFESSIONAL (PHONE) [facility]  Len Joseph 52 y o  female MRN: [id number]  Unit/Bed#: ED 17 Encounter: [id number]      Reason for Consult / Principal Problem: suspected overdose, AMS    Inpatient consult to Toxicology  Consult performed by: Corey Cornejo MD  Consult ordered by: Gabbie Carr MD        06/17/22      ASSESSMENT:  35-year-old female presenting to the emergency department with somnolence, suspected overdose of quetiapine and zolpidem, known to have prescriptions for clonazepam and duloxetine  RECOMMENDATIONS:    The patient is on multiple agents that can cause CNS depression including benzodiazepines, SSRI, hypnotic, and atypical antipsychotic with strong antihistamine effect  Her clinical presentation is consistent with these medications  Quetiapine has multiple mechanisms of action, which in the setting of an overdose leads to a constellation of effects: Antihistamine --> CNS sedation    Antimuscarinic --> Anticholinergic Toxicity: hyperthermia, hypertension, tachycardia, AMS, agitation, mydriasis, dry mucous membranes, flushed skin, decreased GI motility, urinary retention, seizures    Alpha 1 antagonism --> hypotension, miosis (which often prevails over the antimuscarinic mydriasis)    D2 antagonism -->  extrapyramidal side effects such as torticollis, jaw muscle spasm, oculogyric crisis, rigidity, bradykinesia and pill-rolling tremor    It can also cause QTc prolongation, but this is rarely clinically relevant because the antimuscarinic tachycardia is protective against TdP  Mild intoxication usually causes sedation, tachycardia,  and small pupils  More severe intoxication can cause coma, seizures, and respiratory arrest  There is not a specific antidote and treatment is mainly supportive care  Please make sure to rule out urinary retention  Zolpidem is imidazopyridine hypnotic that is frequently prescribed as a sleep aid   It binds to RAY-A receptors in the brain, specifically the BZD 1 receptors which mediates sedation  They  have little effect on the stages of sleep  Because of their selectivity, they have minimal effect on the other sites of the RAY receptor, which mediate anxiolytic, anticonvulsant, and muscle-relaxant effects  It is hepatically metabolized  In isolated overdoses drowsiness and CNS depression are common with minimal respiratory depression  Treatment is largely supportive  Flumazenil, a benzodiazepine receptor antagonist, can reverse the effects of this  drug, but routine use is not recommended given the low toxicity  The patient appears to be taking clonazepam regularly, when she is more awake, I would continue her benzodiazepines unless she wishes to be detoxified from benzodiazepines, which may be advisable  This can be achieved by a taper or potentially admission to the detox unit if the patient is interested  This patient can be cleared from a toxicologic standpoint when her mentation is at baseline, vital signs are within normal limits, and patient is ambulatory  For further questions, please contact the medical  on call via [facility] [employer] or throughl the [employer]  Service or Patient Dispop  Please see additional teaching note below:    Atypical Antipsychotics    Medical Toxicology   [address]   Last revised February 2011    Uses:  Atypical antipsychotics are used for a wide range of psychiatric disorders including psychosis, major depression, and bipolar disorders  The agents are occasionally used off label as sleep aids  Currently approved atypical antipsychotics include: clozapine, olanzapine, quetiepine, risperidone, ziprasidone, paliperidone, iloperidone, and asenapine    The primary pharmacologic target is to block D2 receptors in the CNS however, these agents are dirty affecting many other receptors including histamine, RAY, muscarine, norepinephrine, serotonin, and alpha  Metabolism: These agents are generally hepatically metabolized by various CYP systems  Genetic polymorphisms may contribute to the variable effects seen in overdose  Toxicity:  Toxicities are largely extensions of the therapeutic effects  CNS depression is the most common symptom  Tachycardia, anticholinergic effects, and mild hypotension are commonly observed signs  Serotonin toxicity is not expected with overdose of these agents since most have serotonin antagonistic properties  While extrapyramidal symptoms may rarely occur in therapeutic use, they are not a significant component of acute overdose toxicity  The neuroleptic malignant syndrome is not associated with acute overdose  See table below for the agents receptor effects and specific toxicities associated  Monitoring: All overdose patients should have acetaminophen and aspirin serum concentrations obtained to screen for occult ingestion  An electrocardiogram  should be obtained to ensure there is no significant ventricular dysrhthmia  QTc prolongation is common, is not associated with ventricular dysrhythmias, and resolves as toxicity resolves  A CBC may be recommended in clozapine toxicity due to its association with agranulocytosis though this adverse effect is seen in therapeutic use not in overdose  Treatment:  Treatment is largely supportive  Patients simply require time to metabolize the offending agent  Airway management may be indicated for airway protection though these agents do not cause central respiratory failure  IV hydration for patients with hypotension should be provided until the patient is felt to be euvolemic  If the patient remains hypotensive then a direct acting pressor, such as norepinephrine, should be added for blood pressure support    No active intervention is indicated for QTc prolongation though electrolytes should be repleted PRN to avoid increased risk of ventricular dysrhythmias  Agent D2 Antagonism H1/H2 Agonism Alpha-1 Antagonism Elimin T1/2 Comments   Clozapine + +++ - 6-7 hrs Agranulocytosis in therapeutic use   Risperidone ++ + +++ 3-20 hrs Has an active metabolite   Quetiepine + +++ ++ 5-8 hrs Anticholinergic effects last longer than CNS depression   Ziprasidone +++ + +++ 4-10 hrs    Olanzipine + +++ + 21-54 hrs Has muscarinic effects         Hx and PE limited by the dynamics of a phone consultation  I have not personally interviewed or evaluated the patient, but only advised based on the information provided to me  Primary provider is responsible for all clinical decisions  Pertinent history, physical exam and clinical findings and course discussed: Antonietta Powell is a 52y o  year old female who presents to the emergency department with somnolence/lethargy  Past medical history significant for depression  Patient is currently on zolpidem, quetiapine, clonazepam, and duloxetine   reports that he was asked by the patient to  some bread and when he returned home this morning, the patient was found somnolent with pill bottles:  Quetiapine and zolpidem around her  Patient received naloxone by EMS with no improvement in symptoms  On arrival to the ED, patient was tachycardic, notably lethargic with small to mid sized sluggish bilateral pupils and no other specific toxidrome on physical exam     Review of systems and physical exam not performed by me      Historical Information   Past Medical History:   Diagnosis Date    Asthma     Migraine     Pituitary tumor     Psychiatric disorder     Depression     Past Surgical History:   Procedure Laterality Date    HYSTERECTOMY       Social History   Social History     Substance and Sexual Activity   Alcohol Use No     Social History     Substance and Sexual Activity   Drug Use No     Social History     Tobacco Use   Smoking Status Never Smoker   Smokeless Tobacco Never Used     Family History   Problem Relation Age of Onset    No Known Problems Mother     Lymphoma Sister     No Known Problems Maternal Grandmother     No Known Problems Paternal Grandmother     No Known Problems Maternal Aunt     No Known Problems Maternal Aunt         Prior to Admission medications    Medication Sig Start Date End Date Taking? Authorizing Provider   Cholecalciferol (Vitamin D3) 50 MCG (2000 UT) TABS Take 2,000 Units by mouth daily    Historical Provider, MD   clonazePAM (KlonoPIN) 2 mg tablet Take 1 mg by mouth 2 (two) times a day    Historical Provider, MD   DULoxetine (CYMBALTA) 20 mg capsule Take 60 mg by mouth daily    Historical Provider, MD   estradiol (ESTRACE) 1 mg tablet Take 2 mg by mouth daily 4/15/15   Historical Provider, MD   lidocaine (LIDODERM) 5 % Apply 1 patch topically daily Remove & Discard patch within 12 hours or as directed by MD   Placed on area of maximum discomfort 6/3/22   Army Jozef DO   naproxen (NAPROSYN) 500 mg tablet Take 1 tablet (500 mg total) by mouth every 12 (twelve) hours as needed for mild pain or moderate pain 6/3/22   Army Jozef DO   QUEtiapine Fumarate (SEROQUEL PO) Take 600 mg by mouth daily at bedtime      Historical Provider, MD       No current facility-administered medications for this encounter  No Known Allergies    Objective       Intake/Output Summary (Last 24 hours) at 6/17/2022 1323  Last data filed at 6/17/2022 1311  Gross per 24 hour   Intake --   Output 700 ml   Net -700 ml       Invasive Devices:   Peripheral IV 06/17/22 Left Antecubital (Active)       Vitals   Vitals:    06/17/22 1100 06/17/22 1130 06/17/22 1145 06/17/22 1215   BP: 108/67 108/66 112/68 108/68   TempSrc:       Pulse: 92 90 88 88   Resp: 14 13 13 12   Patient Position - Orthostatic VS: Lying Lying Lying Lying   Temp:             EKG, Pathology, and/or Other Studies: I have personally reviewed pertinent reports       Sinus tachycardia 100, QRS 88, , no ST elevation or depression, no ectopy, no terminal R  Lab Results: I have personally reviewed pertinent reports  Labs:    Results from last 7 days   Lab Units 06/17/22  0946   WBC Thousand/uL 5 95   HEMOGLOBIN g/dL 12 0   HEMATOCRIT % 36 6   PLATELETS Thousands/uL 351   NEUTROS PCT % 70   LYMPHS PCT % 22   MONOS PCT % 6      Results from last 7 days   Lab Units 06/17/22  0946   SODIUM mmol/L 138   POTASSIUM mmol/L 3 7   CHLORIDE mmol/L 102   CO2 mmol/L 26   BUN mg/dL 13   CREATININE mg/dL 1 07   CALCIUM mg/dL 8 4   ALK PHOS U/L 88   ALT U/L 25   AST U/L 19      Results from last 7 days   Lab Units 06/17/22  0946   INR  1 00   PTT seconds 31         No results found for: TROPONINI      Results from last 7 days   Lab Units 06/17/22  0946   ACETAMINOPHEN LVL ug/mL <2*   ETHANOL LVL mg/dL <3   SALICYLATE LVL mg/dL <3*     Invalid input(s): EXTPREGUR      Imaging Studies: I have personally reviewed pertinent reports  Counseling / Coordination of Care  Total time spent today 45 minutes  This was a phone consultation

## 2022-06-17 NOTE — PLAN OF CARE
Problem: Potential for Falls  Goal: Patient will remain free of falls  Description: INTERVENTIONS:  - Educate patient/family on patient safety including physical limitations  - Instruct patient to call for assistance with activity   - Consult OT/PT to assist with strengthening/mobility   - Keep Call bell within reach  - Keep bed low and locked with side rails adjusted as appropriate  - Keep care items and personal belongings within reach  - Initiate and maintain comfort rounds  - Make Fall Risk Sign visible to staff  - Offer Toileting every 2 Hours, in advance of need  - Initiate/Maintain bed alarm  - Obtain necessary fall risk management equipment: alarms  - Apply yellow socks and bracelet for high fall risk patients  - Consider moving patient to room near nurses station  Outcome: Progressing     Problem: CARDIOVASCULAR - ADULT  Goal: Maintains optimal cardiac output and hemodynamic stability  Description: INTERVENTIONS:  - Monitor I/O, vital signs and rhythm  - Monitor for S/S and trends of decreased cardiac output  - Administer and titrate ordered vasoactive medications to optimize hemodynamic stability  - Assess quality of pulses, skin color and temperature  - Assess for signs of decreased coronary artery perfusion  - Instruct patient to report change in severity of symptoms  Outcome: Progressing  Goal: Absence of cardiac dysrhythmias or at baseline rhythm  Description: INTERVENTIONS:  - Continuous cardiac monitoring, vital signs, obtain 12 lead EKG if ordered  - Administer antiarrhythmic and heart rate control medications as ordered  - Monitor electrolytes and administer replacement therapy as ordered  Outcome: Progressing     Problem: Depression - IP adult  Goal: Effects of depression will be minimized  Description: INTERVENTIONS:  - Assess impact of patient's symptoms on level of functioning, self-care needs and offer support as indicated  - Assess patient/family knowledge of depression, impact on illness and need for teaching  - Provide emotional support, presence and reassurance  - Assess for possible suicidal thoughts, ideation or self-harm  If patient expresses suicidal thoughts or statements do not leave alone, notify physician/AP immediately, initiate Suicide Precautions, and determine need for continual observation   - Initiate consults and referrals as appropriate (a mental health professional, Spiritual Care)  - Administer medication as ordered  Outcome: Progressing     Problem: SELF HARM  Goal: Effect of psychiatric condition will be minimized and patient will be protected from self harm  Description: INTERVENTIONS:  - Assess impact of patient's symptoms on level of functioning, self-care needs and offer support as indicated  - Assess patient/family knowledge of depression, impact on illness and need for teaching  - Provide emotional support, presence and reassurance  - Assess for possible suicidal thoughts, ideation or self-harm  If patient expresses suicidal thoughts or statements do not leave alone, notify physician/AP immediately, initiate suicide precautions, and determine need for continual observation    - initiate consults and referrals as appropriate (a mental health professional, Spiritual Care  Outcome: Progressing     Problem: SUBSTANCE USE/ABUSE  Goal: Will have no detox symptoms and will verbalize plan for changing substance-related behavior  Description: INTERVENTIONS:  - Monitor physical status and assess for symptoms of withdrawal  - Administer medication as ordered  - Provide emotional support with 1 on 1 interaction with staff  - Encourage recovery focused program/ addiction education  - Assess for verbalization of changing behaviors related to substance abuse  - Initiate consults and referrals as appropriate (Case Management, Spiritual Care, etc )  Outcome: Progressing

## 2022-06-17 NOTE — ED PROVIDER NOTES
History  Chief Complaint   Patient presents with    Overdose - Intentional     Arrives via EMS, called by significant other, overdosed on unknown number of seroquel and ambien  Given 2mg narcan IVP with no response  History provided by:  Patient   used: No    Overdose - Intentional  Ingested substance:  Prescription medication  Time since incident: unknown  Ingestion amount:  Unknown  Witnesses present: no    Called poison control: no    Incident location:  Home  Context: depression    Associated symptoms: altered mental status    Altered mental status:     Severity:  Moderate    Onset quality:  Unable to specify    Timing:  Sporadic    Progression:  Unchanged    Features:  Impaired awareness  Risk factors: similar prior episodes        Prior to Admission Medications   Prescriptions Last Dose Informant Patient Reported? Taking?    Cholecalciferol (Vitamin D3) 50 MCG (2000 UT) TABS   Yes No   Sig: Take 2,000 Units by mouth daily   DULoxetine (CYMBALTA) 20 mg capsule   Yes No   Sig: Take 60 mg by mouth daily   QUEtiapine Fumarate (SEROQUEL PO)   Yes No   Sig: Take 600 mg by mouth daily at bedtime     clonazePAM (KlonoPIN) 2 mg tablet   Yes No   Sig: Take 1 mg by mouth 2 (two) times a day   estradiol (ESTRACE) 1 mg tablet   Yes No   Sig: Take 2 mg by mouth daily   lidocaine (LIDODERM) 5 %   No No   Sig: Apply 1 patch topically daily Remove & Discard patch within 12 hours or as directed by MD   Placed on area of maximum discomfort   naproxen (NAPROSYN) 500 mg tablet   No No   Sig: Take 1 tablet (500 mg total) by mouth every 12 (twelve) hours as needed for mild pain or moderate pain      Facility-Administered Medications: None       Past Medical History:   Diagnosis Date    Asthma     Migraine     Pituitary tumor     Psychiatric disorder     Depression       Past Surgical History:   Procedure Laterality Date    HYSTERECTOMY         Family History   Problem Relation Age of Onset    No Known Problems Mother     Lymphoma Sister     No Known Problems Maternal Grandmother     No Known Problems Paternal Grandmother     No Known Problems Maternal Aunt     No Known Problems Maternal Aunt      I have reviewed and agree with the history as documented  E-Cigarette/Vaping    E-Cigarette Use Current Every Day User      E-Cigarette/Vaping Substances    Nicotine Yes vape    THC No     CBD No     Flavoring No     Other No     Unknown No      Social History     Tobacco Use    Smoking status: Never Smoker    Smokeless tobacco: Never Used   Vaping Use    Vaping Use: Every day    Substances: Nicotine (vape)   Substance Use Topics    Alcohol use: No    Drug use: No       Review of Systems   Unable to perform ROS: Mental status change       Physical Exam  Physical Exam  Vitals and nursing note reviewed  Constitutional:       Comments: Drowsy   HENT:      Head: Normocephalic and atraumatic  Nose: No congestion  Mouth/Throat:      Mouth: Mucous membranes are moist    Eyes:      Comments: Pupils 2 mm sluggish bilateral   Cardiovascular:      Rate and Rhythm: Normal rate and regular rhythm  Pulses: Normal pulses  Pulmonary:      Effort: No respiratory distress  Abdominal:      General: Abdomen is flat  There is no distension  Palpations: Abdomen is soft  Musculoskeletal:         General: No deformity or signs of injury  Cervical back: Neck supple  Skin:     General: Skin is warm and dry  Capillary Refill: Capillary refill takes less than 2 seconds     Neurological:      Comments: Drowsy, trying to follow simple commands slowly, no clonus   Psychiatric:      Comments: Unable to assess         Vital Signs  ED Triage Vitals   Temperature Pulse Respirations Blood Pressure SpO2   06/17/22 0932 06/17/22 0929 06/17/22 0929 06/17/22 0929 06/17/22 0929   97 7 °F (36 5 °C) 102 16 111/70 98 %      Temp Source Heart Rate Source Patient Position - Orthostatic VS BP Location FiO2 (%)   06/17/22 0932 06/17/22 1045 06/17/22 1045 06/17/22 1045 --   Oral Monitor Lying Right arm       Pain Score       06/17/22 2120       No Pain           Vitals:    06/17/22 1435 06/17/22 2128 06/18/22 0704 06/18/22 1436   BP: 111/65 102/61 93/63 93/61   Pulse: 92 95 81 88   Patient Position - Orthostatic VS: Lying            Visual Acuity  Visual Acuity    Flowsheet Row Most Recent Value   L Pupil Size (mm) 3   R Pupil Size (mm) 3          ED Medications  Medications   acetaminophen (TYLENOL) tablet 650 mg (has no administration in time range)   ondansetron (ZOFRAN) injection 4 mg (has no administration in time range)   clonazePAM (KlonoPIN) tablet 1 mg (has no administration in time range)   DULoxetine (CYMBALTA) delayed release capsule 60 mg (60 mg Oral Given 6/18/22 1009)   QUEtiapine (SEROquel) tablet 300 mg (300 mg Oral Given 6/18/22 1747)   traZODone (DESYREL) tablet 100 mg (has no administration in time range)   naloxone (FOR EMS ONLY) [facility]) 2 MG/2ML injection 2 mg (0 mg Does not apply Given to EMS 6/17/22 0951)       Diagnostic Studies  Results Reviewed     Procedure Component Value Units Date/Time    Rapid drug screen, urine [[id number]]  (Abnormal) Collected: 06/17/22 1322    Lab Status: Final result Specimen: Urine, Catheter Updated: 06/17/22 1357     Amph/Meth UR Negative     Barbiturate Ur Negative     Benzodiazepine Urine Positive     Cocaine Urine Negative     Methadone Urine Negative     Opiate Urine Negative     PCP Ur Negative     THC Urine Negative     Oxycodone Urine Negative    Narrative:      Presumptive report  If requested, specimen will be sent to reference lab for confirmation  FOR MEDICAL PURPOSES ONLY  IF CONFIRMATION NEEDED PLEASE CONTACT THE LAB WITHIN 5 DAYS      Drug Screen Cutoff Levels:  AMPHETAMINE/METHAMPHETAMINES  1000 ng/mL  BARBITURATES     200 ng/mL  BENZODIAZEPINES     200 ng/mL  COCAINE      300 ng/mL  METHADONE      300 ng/mL  OPIATES      300 ng/mL  PHENCYCLIDINE     25 ng/mL  THC       50 ng/mL  OXYCODONE      100 ng/mL    Urine Macroscopic, POC [[id number]] Collected: 06/17/22 1330    Lab Status: Final result Specimen: Urine Updated: 06/17/22 1332     Color, UA Yellow     Clarity, UA Clear     pH, UA 6 0     Leukocytes, UA Negative     Nitrite, UA Negative     Protein, UA Negative mg/dl      Glucose, UA Negative mg/dl      Ketones, UA Negative mg/dl      Urobilinogen, UA 0 2 E U /dl      Bilirubin, UA Negative     Blood, UA Negative     Specific Gravity, UA 1 015    Narrative:      CLINITEK RESULT    COVID/FLU/RSV - 2 hour TAT [[id number]]  (Normal) Collected: 06/17/22 1136    Lab Status: Final result Specimen: Nasopharyngeal Swab Updated: 06/17/22 1217     SARS-CoV-2 Negative     INFLUENZA A PCR Negative     INFLUENZA B PCR Negative     RSV PCR Negative    Narrative:      FOR PEDIATRIC PATIENTS - copy/paste COVID Guidelines URL to browser: [email or web address]  [email or web address]    SARS-CoV-2 assay is a Nucleic Acid Amplification assay intended for the  qualitative detection of nucleic acid from SARS-CoV-2 in nasopharyngeal  swabs  Results are for the presumptive identification of SARS-CoV-2 RNA  Positive results are indicative of infection with SARS-CoV-2, the virus  causing COVID-19, but do not rule out bacterial infection or co-infection  with other viruses  Laboratories within the [place] and its  territories are required to report all positive results to the appropriate  public health authorities  Negative results do not preclude SARS-CoV-2  infection and should not be used as the sole basis for treatment or other  patient management decisions  Negative results must be combined with  clinical observations, patient history, and epidemiological information  This test has not been FDA cleared or approved  This test has been authorized by FDA under an Emergency Use Authorization  (EUA)   This test is only authorized for the duration of time the  declaration that circumstances exist justifying the authorization of the  emergency use of an in vitro diagnostic tests for detection of SARS-CoV-2  virus and/or diagnosis of COVID-19 infection under section 564(b)(1) of  the Act, 21 U  S C  331WWP-7(B)(3), unless the authorization is terminated  or revoked sooner  The test has been validated but independent review by FDA  and CLIA is pending  Test performed using [employer]: This RT-PCR assay targets N2,  a region unique to SARS-CoV-2  A conserved region in the E-gene was chosen  for pan-Sarbecovirus detection which includes SARS-CoV-2  Ammonia [[id number]]  (Normal) Collected: 06/17/22 0946    Lab Status: Final result Specimen: Blood from Arm, Left Updated: 06/17/22 1129     Ammonia 16 umol/L     Acetaminophen level-If concentration is detectable, please discuss with medical  on call   [[id number]]  (Abnormal) Collected: 06/17/22 0946    Lab Status: Final result Specimen: Blood from Arm, Left Updated: 06/17/22 1052     Acetaminophen Level <2 ug/mL     Ethanol [[id number]]  (Normal) Collected: 06/17/22 0946    Lab Status: Final result Specimen: Blood from Arm, Left Updated: 06/17/22 1044     Ethanol Lvl <3 mg/dL     Comprehensive metabolic panel [[id number]]  (Abnormal) Collected: 06/17/22 0946    Lab Status: Final result Specimen: Blood from Arm, Left Updated: 06/17/22 1027     Sodium 138 mmol/L      Potassium 3 7 mmol/L      Chloride 102 mmol/L      CO2 26 mmol/L      ANION GAP 10 mmol/L      BUN 13 mg/dL      Creatinine 1 07 mg/dL      Glucose 140 mg/dL      Calcium 8 4 mg/dL      Corrected Calcium 9 2 mg/dL      AST 19 U/L      ALT 25 U/L      Alkaline Phosphatase 88 U/L      Total Protein 6 5 g/dL      Albumin 3 0 g/dL      Total Bilirubin 0 19 mg/dL      eGFR 61 ml/min/1 73sq m     Narrative:      [place] guidelines for Chronic Kidney Disease (CKD):     Stage 1 with normal or high GFR (GFR > 90 mL/min/1 73 square meters)    Stage 2 Mild CKD (GFR = 60-89 mL/min/1 73 square meters)    Stage 3A Moderate CKD (GFR = 45-59 mL/min/1 73 square meters)    Stage 3B Moderate CKD (GFR = 30-44 mL/min/1 73 square meters)    Stage 4 Severe CKD (GFR = 15-29 mL/min/1 73 square meters)    Stage 5 End Stage CKD (GFR <15 mL/min/1 73 square meters)  Note: GFR calculation is accurate only with a steady state creatinine    TSH [[id number]]  (Abnormal) Collected: 06/17/22 0946    Lab Status: Final result Specimen: Blood from Arm, Left Updated: 06/17/22 1027     TSH 3RD GENERATON 7 823 uIU/mL     Narrative:      Patients undergoing fluorescein dye angiography may retain small amounts of fluorescein in the body for 48-72 hours post procedure  Samples containing fluorescein can produce falsely depressed TSH values  If the patient had this procedure,a specimen should be resubmitted post fluorescein clearance        CK Total with Reflex CKMB [[id number]]  (Normal) Collected: 06/17/22 0946    Lab Status: Final result Specimen: Blood from Arm, Left Updated: 06/17/22 1027     Total CK 96 U/L     Salicylate level [[id number]]  (Abnormal) Collected: 06/17/22 0946    Lab Status: Final result Specimen: Blood from Arm, Left Updated: 65/34/95 1356     Salicylate Lvl <3 mg/dL     Protime-INR [[id number]]  (Normal) Collected: 06/17/22 0946    Lab Status: Final result Specimen: Blood from Arm, Left Updated: 06/17/22 1015     Protime 13 0 seconds      INR 1 00    APTT [[id number]]  (Normal) Collected: 06/17/22 0946    Lab Status: Final result Specimen: Blood from Arm, Left Updated: 06/17/22 1015     PTT 31 seconds     CBC and differential [[id number]] Collected: 06/17/22 0946    Lab Status: Final result Specimen: Blood from Arm, Left Updated: 06/17/22 0954     WBC 5 95 Thousand/uL      RBC 3 85 Million/uL      Hemoglobin 12 0 g/dL      Hematocrit 36 6 %      MCV 95 fL      MCH 31 2 pg      MCHC 32 8 g/dL      RDW 12 4 % MPV 9 8 fL      Platelets 391 Thousands/uL      nRBC 0 /100 WBCs      Neutrophils Relative 70 %      Immat GRANS % 0 %      Lymphocytes Relative 22 %      Monocytes Relative 6 %      Eosinophils Relative 2 %      Basophils Relative 0 %      Neutrophils Absolute 4 12 Thousands/µL      Immature Grans Absolute 0 01 Thousand/uL      Lymphocytes Absolute 1 33 Thousands/µL      Monocytes Absolute 0 33 Thousand/µL      Eosinophils Absolute 0 14 Thousand/µL      Basophils Absolute 0 02 Thousands/µL     Fingerstick Glucose (POCT) [[id number]]  (Normal) Collected: 06/17/22 0950    Lab Status: Final result Updated: 06/17/22 0952     POC Glucose 120 mg/dl     POCT pregnancy, urine [[id number]]     Lab Status: No result                  CT head without contrast   Final Result by Maria Fernanda Morales MD (06/17 1115)      No acute intracranial abnormality  Workstation performed: [id number]                    Procedures  ECG 12 Lead Documentation Only    Date/Time: 6/17/2022 9:33 AM  Performed by: Kalani Waller MD  Authorized by: Kalani Waller MD     Indications / Diagnosis:  Overdose  ECG reviewed by me, the ED Provider: yes    Patient location:  ED  Interpretation:     Interpretation: normal    Rate:     ECG rate:  100    ECG rate assessment: normal    Rhythm:     Rhythm: sinus rhythm    Ectopy:     Ectopy: none    QRS:     QRS axis:  Normal (88 ms)  Conduction:     Conduction: normal    ST segments:     ST segments:  Normal  T waves:     T waves: normal    Comments:      MARTY 166 ms  QRS 88 ms   ms             ED Course  ED Course as of 06/18/22 1844   Fri Jun 17, 2022   1036 WBC: 5 95   1036 Hemoglobin: 12 0   1036 Platelet Count: 732   1036 Sodium: 138   1036 Potassium: 3 7   1036 BUN: 13   1036 Creatinine: 6 14   0593 SALICYLATE LEVEL(!): <3   1036 TSH 3RD GENERATON(!): 7 823   1036 Total CK: 96  Labs reviewed   Patient re-evaluated, still drowsy, slowly responds to calls     1236 Discussed with Tox, agree with admission for observation  SBIRT 22yo+    Flowsheet Row Most Recent Value   SBIRT (23 yo +)    In order to provide better care to our patients, we are screening all of our patients for alcohol and drug use  Would it be okay to ask you these screening questions? Unable to answer at this time Filed at: 06/17/2022 8942                    [facility]  Number of Diagnoses or Management Options  Overdose: new and requires workup  Diagnosis management comments: Patient is a 66-year-old female, history of asthma, migraine, depression, brought by EMS with episode of Overodose on unknown amount of Seroquel, Ambien, according to significant other, patient asked in the morning to get coffee and bread, after he arrived back, notice patient in the bed with a bottle around, not responding  On exam, patient is drowsy, maintaining airway, O2 sats, will get tox workup, ct head, admit for observation, Psych eval        Amount and/or Complexity of Data Reviewed  Clinical lab tests: ordered and reviewed  Tests in the radiology section of CPT®: reviewed and ordered  Tests in the medicine section of CPT®: reviewed and ordered  Discuss the patient with other providers: yes  Independent visualization of images, tracings, or specimens: yes        Disposition  Final diagnoses:   Overdose     Time reflects when diagnosis was documented in both MDM as applicable and the Disposition within this note     Time User Action Codes Description Comment    6/17/2022  9:51 AM Yaron Alford 35 Overdose     6/18/2022  9:42 AM Earna Fleischer Add [T50 902A] Intentional drug overdose, initial encounter [facility])       ED Disposition     ED Disposition   Admit    Condition   Stable    Date/Time   Fri Jun 17, 2022 12:49 PM    Comment   Case was discussed with Dr Ben Guy and the patient's admission status was agreed to be Admission Status: observation status to the service of Dr Ben Guy             Follow-up Information    None Current Discharge Medication List      CONTINUE these medications which have NOT CHANGED    Details   Cholecalciferol (Vitamin D3) 50 MCG (2000 UT) TABS Take 2,000 Units by mouth daily      clonazePAM (KlonoPIN) 2 mg tablet Take 1 mg by mouth 2 (two) times a day      DULoxetine (CYMBALTA) 20 mg capsule Take 60 mg by mouth daily      estradiol (ESTRACE) 1 mg tablet Take 2 mg by mouth daily      lidocaine (LIDODERM) 5 % Apply 1 patch topically daily Remove & Discard patch within 12 hours or as directed by MD   Placed on area of maximum discomfort  Qty: 5 patch, Refills: 0    Associated Diagnoses: Acute exacerbation of chronic low back pain      naproxen (NAPROSYN) 500 mg tablet Take 1 tablet (500 mg total) by mouth every 12 (twelve) hours as needed for mild pain or moderate pain  Qty: 15 tablet, Refills: 0    Associated Diagnoses: Acute exacerbation of chronic low back pain      QUEtiapine Fumarate (SEROQUEL PO) Take 600 mg by mouth daily at bedtime               No discharge procedures on file      PDMP Review       Value Time User    PDMP Reviewed  Yes 6/5/2022  1:19 AM Sharri Stephen, [facility]          ED Provider  Electronically Signed by           Jorge Hidalgo MD  06/18/22 8402

## 2022-06-17 NOTE — ASSESSMENT & PLAN NOTE
69-year-old female with past medical history of depression, asthma, pituitary mass presented with altered mental status, lethargy   reports that patient had been found after leaving patient alone for brief moment lethargic, obtunded with open bottle of pills  Suspect seroquel and ambien overdose, but also takes clonazepam and cymbalta  Toxicology recommending monitoring patient  Hold Seroquel, Cymbalta, Ambien and benzos at this time  Resume benzo but more awake to prevent withdrawals  Psych evaluation once more awake, patient may need inpatient psych placement  Continue one-to-one for intentional overdose

## 2022-06-18 PROCEDURE — 99225 PR SBSQ OBSERVATION CARE/DAY 25 MINUTES: CPT | Performed by: STUDENT IN AN ORGANIZED HEALTH CARE EDUCATION/TRAINING PROGRAM

## 2022-06-18 RX ORDER — DULOXETIN HYDROCHLORIDE 60 MG/1
60 CAPSULE, DELAYED RELEASE ORAL DAILY
Status: DISCONTINUED | OUTPATIENT
Start: 2022-06-18 | End: 2022-06-20 | Stop reason: HOSPADM

## 2022-06-18 RX ORDER — CLONAZEPAM 1 MG/1
1 TABLET ORAL 2 TIMES DAILY PRN
Status: DISCONTINUED | OUTPATIENT
Start: 2022-06-18 | End: 2022-06-20 | Stop reason: HOSPADM

## 2022-06-18 RX ORDER — QUETIAPINE FUMARATE 300 MG/1
300 TABLET, FILM COATED ORAL 2 TIMES DAILY
Status: DISCONTINUED | OUTPATIENT
Start: 2022-06-18 | End: 2022-06-20 | Stop reason: HOSPADM

## 2022-06-18 RX ORDER — TRAZODONE HYDROCHLORIDE 100 MG/1
100 TABLET ORAL
Status: DISCONTINUED | OUTPATIENT
Start: 2022-06-18 | End: 2022-06-20 | Stop reason: HOSPADM

## 2022-06-18 RX ADMIN — QUETIAPINE FUMARATE 300 MG: 300 TABLET ORAL at 17:47

## 2022-06-18 RX ADMIN — DULOXETINE HYDROCHLORIDE 60 MG: 60 CAPSULE, DELAYED RELEASE ORAL at 10:09

## 2022-06-18 NOTE — ASSESSMENT & PLAN NOTE
71-year-old female with past medical history of depression, asthma, pituitary mass presented with altered mental status, lethargy   reports that patient had been found after leaving patient alone for brief moment lethargic, obtunded with open bottle of pills  Suspect seroquel and ambien overdose, but also takes clonazepam and cymbalta  Toxicology recommendations appreciated  Resume cymbalta, clonazepam prn, avoid withdrawal  Hold Seroquel, Ambien  Psych consulted, evaluate for inpatient psych as patient with 2nd episode of overdose in past 1 month  Continue 1:1

## 2022-06-18 NOTE — PROGRESS NOTES
[facility]  Progress Note - Efrain Fountain 1975, 52 y o  female MRN: [id number]  Unit/Bed#: [place] [address] -01 Encounter: [id number]  Primary Care Provider: Efren Krabbe   Date and time admitted to hospital: 6/17/2022  9:19 AM    * Intentional drug overdose [facility])  Assessment & Plan  75-year-old female with past medical history of depression, asthma, pituitary mass presented with altered mental status, lethargy   reports that patient had been found after leaving patient alone for brief moment lethargic, obtunded with open bottle of pills  Suspect seroquel and ambien overdose, but also takes clonazepam and cymbalta  Toxicology recommendations appreciated  Resume cymbalta, clonazepam prn, avoid withdrawal  Hold Seroquel, Ambien  Psych consulted, evaluate for inpatient psych as patient with 2nd episode of overdose in past 1 month  Continue 1:1        Severe episode of recurrent major depressive disorder, with psychotic features ([facility] [address] )  Assessment & Plan  Maintained on Cymbalta and Seroquel  Continue cymbalta, hold seroquel, possible able to resume pending psych evaluation    Mild intermittent asthma without complication  Assessment & Plan  No acute asthma exacerbation  Not on maintenance inhaler    Pituitary mass [facility])  Assessment & Plan  History of pituitary mass, followed outpatient by Neurosurgery  Stable mass noted on MRI in May  Continue outpatient follow-up with Neurosurgery        VTE Pharmacologic Prophylaxis:   Pharmacologic: none, low risk  Mechanical VTE Prophylaxis in Place: Yes    Patient Centered Rounds: I have performed bedside rounds with nursing staff today  Discussions with Specialists or Other Care Team Provider: Toxicology    Education and Discussions with Family / Patient: patient,  bedside    Time Spent for Care: 30 minutes  More than 50% of total time spent on counseling and coordination of care as described above      Current Length of Stay: 0 day(s)    Current Patient Status: Observation   Certification Statement: The patient will continue to require additional inpatient hospital stay due to pending psych eval    Discharge Plan: pending    Code Status: Level 1 - Full Code      Subjective:   No events overnight  Reports more awake this morning, alert and oriented  Denies any CP, palpitations, constipation, abd pain or sob  Objective:     Vitals:   Temp (24hrs), Av °F (36 1 °C), Min:96 4 °F (35 8 °C), Max:97 4 °F (36 3 °C)    Temp:  [96 4 °F (35 8 °C)-97 4 °F (36 3 °C)] 97 4 °F (36 3 °C)  HR:  [] 81  Resp:  [16-20] 16  BP: ()/(61-69) 93/63  SpO2:  [96 %-100 %] 96 %  Body mass index is 34 64 kg/m²  Input and Output Summary (last 24 hours): Intake/Output Summary (Last 24 hours) at 2022 1240  Last data filed at 2022 1311  Gross per 24 hour   Intake --   Output 700 ml   Net -700 ml       Physical Exam:     Physical Exam  Vitals and nursing note reviewed  Constitutional:       General: She is not in acute distress  Appearance: Normal appearance  She is normal weight  She is not ill-appearing  Comments: lethargic   HENT:      Head: Normocephalic and atraumatic  Eyes:      Conjunctiva/sclera: Conjunctivae normal       Pupils: Pupils are equal, round, and reactive to light  Cardiovascular:      Rate and Rhythm: Normal rate  Pulses: Normal pulses  Pulmonary:      Effort: Pulmonary effort is normal  No respiratory distress  Breath sounds: No wheezing  Abdominal:      General: Bowel sounds are normal  There is no distension  Palpations: Abdomen is soft  Musculoskeletal:         General: No swelling  Right lower leg: No edema  Left lower leg: No edema  Skin:     General: Skin is warm and dry  Neurological:      General: No focal deficit present  Mental Status: Mental status is at baseline         Additional Data:     Labs:    Results from last 7 days   Lab Units 22  2865 WBC Thousand/uL 5 95   HEMOGLOBIN g/dL 12 0   HEMATOCRIT % 36 6   PLATELETS Thousands/uL 351   NEUTROS PCT % 70   LYMPHS PCT % 22   MONOS PCT % 6   EOS PCT % 2     Results from last 7 days   Lab Units 06/17/22  0946   SODIUM mmol/L 138   POTASSIUM mmol/L 3 7   CHLORIDE mmol/L 102   CO2 mmol/L 26   BUN mg/dL 13   CREATININE mg/dL 1 07   ANION GAP mmol/L 10   CALCIUM mg/dL 8 4   ALBUMIN g/dL 3 0*   TOTAL BILIRUBIN mg/dL 0 19*   ALK PHOS U/L 88   ALT U/L 25   AST U/L 19   GLUCOSE RANDOM mg/dL 140     Results from last 7 days   Lab Units 06/17/22  0946   INR  1 00     Results from last 7 days   Lab Units 06/17/22  0950   POC GLUCOSE mg/dl 120                   * I Have Reviewed All Lab Data Listed Above  * Additional Pertinent Lab Tests Reviewed: All Labs For Current Hospital Admission Reviewed    Imaging:    CT head without contrast    Result Date: 6/17/2022  Impression: No acute intracranial abnormality  Workstation performed: [id number]       Recent Cultures (last 7 days):           Last 24 Hours Medication List:   Current Facility-Administered Medications   Medication Dose Route Frequency Provider Last Rate    acetaminophen  650 mg Oral Q6H PRN Elpidio Eller MD      clonazePAM  1 mg Oral BID PRN Elpidio Eller MD      DULoxetine  60 mg Oral Daily Elpidio Eller MD      ondansetron  4 mg Intravenous Q6H PRN Elpidio Eller MD          Today, Patient Was Seen By: Elpidio Eller MD    ** Please Note: Dictation voice to text software may have been used in the creation of this document   **

## 2022-06-18 NOTE — QUICK NOTE
Discussed with Dr Milton Puentes  Patient's mental status is improved this morning and at her baseline  Vital signs have been stable  Please continue her clonazepam to prevent withdrawal if patient desires to continue this medication long-term  No further recommendations from a toxicology perspective  Thank you for the consult  Please contact the medical  on call with questions or concerns

## 2022-06-18 NOTE — ASSESSMENT & PLAN NOTE
Maintained on Cymbalta and Seroquel  Continue cymbalta, hold seroquel, possible able to resume pending psych evaluation

## 2022-06-18 NOTE — UTILIZATION REVIEW
Initial Clinical Review    Observation 6/17 1250 changed to inpatient 6/19 1648  Pt requiring continued stay for inpatient psych facility  Admission: Date/Time/Statement:   Admission Orders (From admission, onward)     Ordered        06/19/22 1648  Inpatient Admission  Once            06/17/22 1250  Place in Observation  Once                      Orders Placed This Encounter   Procedures    Inpatient Admission     Standing Status:   Standing     Number of Occurrences:   1     Order Specific Question:   Level of Care     Answer:   Med Surg [16]     Order Specific Question:   Estimated length of stay     Answer:   More than 2 Midnights     Order Specific Question:   Certification     Answer:   I certify that inpatient services are medically necessary for this patient for a duration of greater than two midnights  See H&P and MD Progress Notes for additional information about the patient's course of treatment  ED Arrival Information     Expected   -    Arrival   6/17/2022 09:19    Acuity   Emergent            Means of arrival   Ambulance    Escorted by   [facility] ([facility])    Service   Hospitalist    Admission type   Emergency            Arrival complaint   overdose           Chief Complaint   Patient presents with    Overdose - Intentional     Arrives via EMS, called by significant other, overdosed on unknown number of seroquel and ambien  Given 2mg narcan IVP with no response  Initial Presentation: 52 y o  female presented to the ED with somnolence, lethargy  Patient past medical history of severe depression, pituitary mass, and asthma  Patient reportedly was found at home this morning, after her  left to get breakfast in bed somnolent with a bottle of pills open around her  There was concerns regarding intentional drug overdose, assessment reports patient has been battling depression    Patient was recently seen here about 2 weeks ago for similar incident, but previously was noted to be accidental  PMH: asthma, migraine, depression  Plan: Observation for intentional drug overdose  Suspect seroquel and ambien overdose  Resume benzo when more awake to prevent withdrawals  Psych eval  Continue 1:1      6/18: Internal medicine: Resume cymbalta, clonazepam prn, avoid withdrawal  Hold Seroquel, Ambien  Psych consulted, evaluate for inpatient psych as patient with 2nd episode of overdose in past 1 month  Continue 1:1     Behavioral Health consult: Recommend inpatient psychiatry admission once medically cleared  Patient agreeable to voluntary admission  Restart Cymbalta, Seroquel, Klonopin  Hold Ambien  Can add Trazadone q hs for sleep  6/19 observation to inpatient  Internal medicine: Continue 1:1  Agreeable and signed 308, medically clear for discharge  Plan for discharge to inpatient psych tomorrow  Date: 6/20 Day 2:    Pt transferred to [facility] for inpatient psychiatric treatment         ED Triage Vitals   Temperature Pulse Respirations Blood Pressure SpO2   06/17/22 0932 06/17/22 0929 06/17/22 0929 06/17/22 0929 06/17/22 0929   97 7 °F (36 5 °C) 102 16 111/70 98 %      Temp Source Heart Rate Source Patient Position - Orthostatic VS BP Location FiO2 (%)   06/17/22 0932 06/17/22 1045 06/17/22 1045 06/17/22 1045 --   Oral Monitor Lying Right arm       Pain Score       06/17/22 2120       No Pain          Wt Readings from Last 1 Encounters:   06/20/22 86 5 kg (190 lb 11 2 oz)     Additional Vital Signs:       Date/Time Temp Pulse Resp BP MAP (mmHg) SpO2 O2 Device   06/19/22 22:01:35 98 4 °F (36 9 °C) 88 20 109/68 82 97 % None (Room air)   06/19/22 15:30:16 97 5 °F (36 4 °C) 79 16 104/63 77 96 % --   06/19/22 07:44:49 97 8 °F (36 6 °C) 75 15 93/60 71 96 % None (Room air)   06/18/22 22:20:01 98 9 °F (37 2 °C) 101 18 98/67 77 93 % --   06/18/22 2000 -- -- -- -- -- -- None (Room air)   06/18/22 14:36:25 97 6 °F (36 4 °C) 88 16 93/61 72 94 % --       06/18/22 0715 -- -- -- -- -- -- None (Room air)   06/18/22 07:04:30 97 4 °F (36 3 °C) Abnormal  81 16 93/63 73 96 % --   06/17/22 21:28:17 97 2 °F (36 2 °C) Abnormal  95 20 102/61 75 97 % --   06/17/22 1541 -- -- -- -- -- -- None (Room air)   06/17/22 14:35:57 96 4 °F (35 8 °C) Abnormal  92 18 111/65 80 96 % None (Room air)   06/17/22 1345 -- 90 16 116/69 87 99 % None (Room air)   06/17/22 1315 -- 100 16 107/65 81 100 % None (Room air)   06/17/22 1215 -- 88 12 108/68 83 99 % None (Room air)   06/17/22 1145 -- 88 13 112/68 85 99 % None (Room air)   06/17/22 1130 -- 90 13 108/66 82 99 % None (Room air)   06/17/22 1100 -- 92 14 108/67 82 99 % None (Room air)   06/17/22 1045 -- 102 21 106/67 81 98 % None (Room air)   06/17/22 1015 -- 98 23 Abnormal  99/59 72 98 % --   06/17/22 1000 -- 90 14 99/60 73 97 % --   06/17/22 0932 97 7 °F (36 5 °C) -- -- -- -- -- --       Pertinent Labs/Diagnostic Test Results:   CT head without contrast   Final Result by Maria Fernanda Morales MD (06/17 1115)      No acute intracranial abnormality            Workstation performed: [id number]           6/17 EKG:  Normal sinus rhythm  Cannot rule out Anterior infarct , age undetermined  Abnormal ECG  When compared with ECG of 04-JUN-2022 21:42,  Nonspecific T wave abnormality now evident in Inferior leads    Results from last 7 days   Lab Units 06/17/22  1136   SARS-COV-2  Negative     Results from last 7 days   Lab Units 06/19/22  0502 06/17/22  0946   WBC Thousand/uL 5 96 5 95   HEMOGLOBIN g/dL 10 8* 12 0   HEMATOCRIT % 34 0* 36 6   PLATELETS Thousands/uL 344 351   NEUTROS ABS Thousands/µL 3 20 4 12         Results from last 7 days   Lab Units 06/19/22  0502 06/17/22  0946   SODIUM mmol/L 139 138   POTASSIUM mmol/L 3 3* 3 7   CHLORIDE mmol/L 105 102   CO2 mmol/L 28 26   ANION GAP mmol/L 6 10   BUN mg/dL 12 13   CREATININE mg/dL 0 84 1 07   EGFR ml/min/1 73sq m 83 61   CALCIUM mg/dL 7 6* 8 4     Results from last 7 days   Lab Units 06/17/22  0946   AST U/L 19   ALT U/L 25   ALK PHOS U/L 88   TOTAL PROTEIN g/dL 6 5   ALBUMIN g/dL 3 0*   TOTAL BILIRUBIN mg/dL 0 19*   AMMONIA umol/L 16     Results from last 7 days   Lab Units 06/17/22  0950   POC GLUCOSE mg/dl 120     Results from last 7 days   Lab Units 06/19/22  0502 06/17/22  0946   GLUCOSE RANDOM mg/dL 107 140           Results from last 7 days   Lab Units 06/17/22  0946   CK TOTAL U/L 96             Results from last 7 days   Lab Units 06/17/22  0946   PROTIME seconds 13 0   INR  1 00   PTT seconds 31     Results from last 7 days   Lab Units 06/17/22  0946   TSH 3RD GENERATON uIU/mL 7 823*           Results from last 7 days   Lab Units 06/17/22  1330   CLARITY UA  Clear   COLOR UA  Yellow   SPEC GRAV UA  1 015   PH UA  6 0   GLUCOSE UA mg/dl Negative   KETONES UA mg/dl Negative   BLOOD UA  Negative   PROTEIN UA mg/dl Negative   NITRITE UA  Negative   BILIRUBIN UA  Negative   UROBILINOGEN UA E U /dl 0 2   LEUKOCYTES UA  Negative     Results from last 7 days   Lab Units 06/17/22  1136   INFLUENZA A PCR  Negative   INFLUENZA B PCR  Negative   RSV PCR  Negative         Results from last 7 days   Lab Units 06/17/22  1322   AMPH/METH  Negative   BARBITURATE UR  Negative   BENZODIAZEPINE UR  Positive*   COCAINE UR  Negative   METHADONE URINE  Negative   OPIATE UR  Negative   PCP UR  Negative   THC UR  Negative     Results from last 7 days   Lab Units 06/17/22  0946   ETHANOL LVL mg/dL <3   ACETAMINOPHEN LVL ug/mL <2*   SALICYLATE LVL mg/dL <3*         ED Treatment:   Medication Administration from 06/17/2022 0919 to 06/17/2022 1426       Date/Time Order Dose Route Action     06/17/2022 0951 naloxone (FOR EMS ONLY) (NARCAN) 2 MG/2ML injection 2 mg 0 mg Does not apply Given to EMS        Past Medical History:   Diagnosis Date    Asthma     Migraine     Pituitary tumor     Psychiatric disorder     Depression     Present on Admission:   Intentional drug overdose ([facility] [address] )   Mild intermittent asthma without complication   Pituitary mass ([facility] )   Severe episode of recurrent major depressive disorder, with psychotic features ([facility] )      Admitting Diagnosis: Overdose [T50 901A]  Age/Sex: 52 y o  female  Admission Orders:  Scheduled Medications:  DULoxetine, 60 mg, Oral, Daily  QUEtiapine, 300 mg, Oral, BID      Continuous IV Infusions:     PRN Meds:  acetaminophen, 650 mg, Oral, Q6H PRN  clonazePAM, 1 mg, Oral, BID PRN  ondansetron, 4 mg, Intravenous, Q6H PRN  traZODone, 100 mg, Oral, HS PRN        IP CONSULT TO TOXICOLOGY  IP CONSULT TO PSYCHIATRY    Network Utilization Review Department  ATTENTION: Please call with any questions or concerns to [phone] and carefully listen to the prompts so that you are directed to the right person  All voicemails are confidential   Daphne Sahni all requests for admission clinical reviews, approved or denied determinations and any other requests to dedicated fax number below belonging to the campus where the patient is receiving treatment   List of dedicated fax numbers for the Facilities:  [address] DENIALS (Administrative/Medical Necessity) [phone]   [address] (Maternity/NICU/Pediatrics) [phone]   [address]  [address] Medical [place] [address] [address] [place] [address] Paul Ortiz [address] [facility] [phone]

## 2022-06-19 PROBLEM — E87.6 HYPOKALEMIA: Status: ACTIVE | Noted: 2022-06-19

## 2022-06-19 LAB
ANION GAP SERPL CALCULATED.3IONS-SCNC: 6 MMOL/L (ref 4–13)
BASOPHILS # BLD AUTO: 0.02 THOUSANDS/ΜL (ref 0–0.1)
BASOPHILS NFR BLD AUTO: 0 % (ref 0–1)
BUN SERPL-MCNC: 12 MG/DL (ref 5–25)
CALCIUM SERPL-MCNC: 7.6 MG/DL (ref 8.3–10.1)
CHLORIDE SERPL-SCNC: 105 MMOL/L (ref 100–108)
CO2 SERPL-SCNC: 28 MMOL/L (ref 21–32)
CREAT SERPL-MCNC: 0.84 MG/DL (ref 0.6–1.3)
EOSINOPHIL # BLD AUTO: 0.19 THOUSAND/ΜL (ref 0–0.61)
EOSINOPHIL NFR BLD AUTO: 3 % (ref 0–6)
ERYTHROCYTE [DISTWIDTH] IN BLOOD BY AUTOMATED COUNT: 12.7 % (ref 11.6–15.1)
GFR SERPL CREATININE-BSD FRML MDRD: 83 ML/MIN/1.73SQ M
GLUCOSE P FAST SERPL-MCNC: 107 MG/DL (ref 65–99)
GLUCOSE SERPL-MCNC: 107 MG/DL (ref 65–140)
HCT VFR BLD AUTO: 34 % (ref 34.8–46.1)
HGB BLD-MCNC: 10.8 G/DL (ref 11.5–15.4)
IMM GRANULOCYTES # BLD AUTO: 0.02 THOUSAND/UL (ref 0–0.2)
IMM GRANULOCYTES NFR BLD AUTO: 0 % (ref 0–2)
LYMPHOCYTES # BLD AUTO: 1.99 THOUSANDS/ΜL (ref 0.6–4.47)
LYMPHOCYTES NFR BLD AUTO: 33 % (ref 14–44)
MCH RBC QN AUTO: 30 PG (ref 26.8–34.3)
MCHC RBC AUTO-ENTMCNC: 31.8 G/DL (ref 31.4–37.4)
MCV RBC AUTO: 94 FL (ref 82–98)
MONOCYTES # BLD AUTO: 0.54 THOUSAND/ΜL (ref 0.17–1.22)
MONOCYTES NFR BLD AUTO: 9 % (ref 4–12)
NEUTROPHILS # BLD AUTO: 3.2 THOUSANDS/ΜL (ref 1.85–7.62)
NEUTS SEG NFR BLD AUTO: 55 % (ref 43–75)
NRBC BLD AUTO-RTO: 0 /100 WBCS
PLATELET # BLD AUTO: 344 THOUSANDS/UL (ref 149–390)
PMV BLD AUTO: 10.1 FL (ref 8.9–12.7)
POTASSIUM SERPL-SCNC: 3.3 MMOL/L (ref 3.5–5.3)
RBC # BLD AUTO: 3.6 MILLION/UL (ref 3.81–5.12)
SODIUM SERPL-SCNC: 139 MMOL/L (ref 136–145)
WBC # BLD AUTO: 5.96 THOUSAND/UL (ref 4.31–10.16)

## 2022-06-19 PROCEDURE — 85025 COMPLETE CBC W/AUTO DIFF WBC: CPT | Performed by: STUDENT IN AN ORGANIZED HEALTH CARE EDUCATION/TRAINING PROGRAM

## 2022-06-19 PROCEDURE — 80048 BASIC METABOLIC PNL TOTAL CA: CPT | Performed by: STUDENT IN AN ORGANIZED HEALTH CARE EDUCATION/TRAINING PROGRAM

## 2022-06-19 PROCEDURE — 99232 SBSQ HOSP IP/OBS MODERATE 35: CPT | Performed by: STUDENT IN AN ORGANIZED HEALTH CARE EDUCATION/TRAINING PROGRAM

## 2022-06-19 RX ORDER — POTASSIUM CHLORIDE 20 MEQ/1
40 TABLET, EXTENDED RELEASE ORAL ONCE
Status: COMPLETED | OUTPATIENT
Start: 2022-06-19 | End: 2022-06-19

## 2022-06-19 RX ADMIN — QUETIAPINE FUMARATE 300 MG: 300 TABLET ORAL at 17:29

## 2022-06-19 RX ADMIN — QUETIAPINE FUMARATE 300 MG: 300 TABLET ORAL at 08:12

## 2022-06-19 RX ADMIN — DULOXETINE HYDROCHLORIDE 60 MG: 60 CAPSULE, DELAYED RELEASE ORAL at 08:12

## 2022-06-19 RX ADMIN — POTASSIUM CHLORIDE 40 MEQ: 1500 TABLET, EXTENDED RELEASE ORAL at 08:12

## 2022-06-19 NOTE — CASE MANAGEMENT
Case Management Progress Note    Patient name Tegan Wilhelm  Location [place] [address] 2 /[address] 2 Nori Mathias* MRN [id number]  : 1975 Date 2022       LOS (days): 0  Geometric Mean LOS (GMLOS) (days):   Days to GMLOS:        OBJECTIVE:        Current admission status: Observation  Preferred Pharmacy:   [place] [address] [address] [facility], [address] [place] [address]  [address] [place] [address] [address]  Phone: [phone] Fax: [phone]    Primary Care Provider: Jordyn Brunson    Primary Insurance: Caren Primrose  Secondary Insurance:     PROGRESS NOTE: 201 signed, CM to begin bed search as pt is medically clear for IP BH placement  [address] [facility] - No beds in network/Referral faxed for potential d/c's    [facility] - No admissions r/t staffing  [address] - Referral faxed  [address] [facility] - No beds  [address] - Referral faxed  [address] [facility],6Th Floor - No female beds  Christy Skeens - Referral faxed for possible bed   [address] - Referral faxed  [address] - Referral faxed for possible bed   [address] - Referral faxed    [phone] 92 - Call received from [address] [place] [address] that [facility] will have a bed tomorrow for pt  CM in to speak with pt and pt's  who are agreeable to facility  Dr Lashay Henderson aware of same  Transport requested via [facility]     1315 - Pt to be p/u from [facility] at 0800 tomorrow, going to [facility], w/ CTS  Dr Lashay Henderson and pt's primary RN, Mary, made aware via TT  CM spoke with [place] at [place], via phone, to make aware of same

## 2022-06-19 NOTE — NURSING NOTE
Pt states that she "feels good" and wants to go home  Pt also stated that she agreed to go to [facility] because if she didn't, she would be involuntarily committed

## 2022-06-19 NOTE — PROGRESS NOTES
[facility]  Progress Note - Mya Sine 1975, 52 y o  female MRN: [id number]  Unit/Bed#: [place] [address] -01 Encounter: [id number]  Primary Care Provider: Calixto Jacome   Date and time admitted to hospital: 6/17/2022  9:19 AM    * Intentional drug overdose [facility])  Assessment & Plan  75-year-old female with past medical history of depression, asthma, pituitary mass presented with altered mental status, lethargy   reports that patient had been found after leaving patient alone for brief moment lethargic, obtunded with open bottle of pills  Suspect seroquel and ambien overdose, but also takes clonazepam and cymbalta  Toxicology recommendations appreciated  Resume seroquel cymbalta, clonazepam prn  Psych consulted, appreciate recommendations, will need inpatient psych  Continue 1:1  Agreeable and signed 201, medically clear for discharge  Plan for discharge to inpatient psych tomorrow      Severe episode of recurrent major depressive disorder, with psychotic features ([facility] )  Assessment & Plan  Maintained on Cymbalta and Seroquel continued    Hypokalemia  Assessment & Plan  repleted    Mild intermittent asthma without complication  Assessment & Plan  No acute asthma exacerbation  Not on maintenance inhaler    Pituitary mass [facility])  Assessment & Plan  History of pituitary mass, followed outpatient by Neurosurgery  Stable mass noted on MRI in May  Continue outpatient follow-up with Neurosurgery      VTE Pharmacologic Prophylaxis:   Pharmacologic: none, low risk  Mechanical VTE Prophylaxis in Place: Yes    Patient Centered Rounds: I have performed bedside rounds with nursing staff today  Discussions with Specialists or Other Care Team Provider: Psych, CM    Education and Discussions with Family / Patient: patient,  bedside    Time Spent for Care: 30 minutes  More than 50% of total time spent on counseling and coordination of care as described above      Current Length of Stay: 0 day(s)    Current Patient Status: Observation   Certification Statement: The patient will continue to require additional inpatient hospital stay due to pending psych placement    Discharge Plan: tomorrow, inpatient psych at [place]    Code Status: Level 1 - Full Code      Subjective:   No events overnight, no complaints  Reports sleeping well  Denies CP, SOB nausea or vomiting  Tolerating diet  Discussed 201 and agreeable for inpatient psych placement  Objective:     Vitals:   Temp (24hrs), Av 1 °F (36 7 °C), Min:97 6 °F (36 4 °C), Max:98 9 °F (37 2 °C)    Temp:  [97 6 °F (36 4 °C)-98 9 °F (37 2 °C)] 97 8 °F (36 6 °C)  HR:  [] 75  Resp:  [15-18] 15  BP: (93-98)/(60-67) 93/60  SpO2:  [93 %-96 %] 96 %  Body mass index is 34 64 kg/m²  Input and Output Summary (last 24 hours):     No intake or output data in the 24 hours ending 22 1200    Physical Exam:     Physical Exam  Vitals and nursing note reviewed  Constitutional:       General: She is not in acute distress  Appearance: Normal appearance  She is normal weight  She is not ill-appearing  HENT:      Head: Normocephalic and atraumatic  Eyes:      Conjunctiva/sclera: Conjunctivae normal       Pupils: Pupils are equal, round, and reactive to light  Cardiovascular:      Rate and Rhythm: Normal rate  Pulses: Normal pulses  Pulmonary:      Effort: Pulmonary effort is normal  No respiratory distress  Breath sounds: No wheezing  Abdominal:      General: Bowel sounds are normal  There is no distension  Palpations: Abdomen is soft  Musculoskeletal:         General: No swelling  Right lower leg: No edema  Left lower leg: No edema  Skin:     General: Skin is warm and dry  Neurological:      General: No focal deficit present  Mental Status: Mental status is at baseline             Additional Data:     Labs:    Results from last 7 days   Lab Units 22  0502   WBC Thousand/uL 5 96   HEMOGLOBIN g/dL 10 8*   HEMATOCRIT % 34 0*   PLATELETS Thousands/uL 344   NEUTROS PCT % 55   LYMPHS PCT % 33   MONOS PCT % 9   EOS PCT % 3     Results from last 7 days   Lab Units 06/19/22  0502 06/17/22  0946   SODIUM mmol/L 139 138   POTASSIUM mmol/L 3 3* 3 7   CHLORIDE mmol/L 105 102   CO2 mmol/L 28 26   BUN mg/dL 12 13   CREATININE mg/dL 0 84 1 07   ANION GAP mmol/L 6 10   CALCIUM mg/dL 7 6* 8 4   ALBUMIN g/dL  --  3 0*   TOTAL BILIRUBIN mg/dL  --  0 19*   ALK PHOS U/L  --  88   ALT U/L  --  25   AST U/L  --  19   GLUCOSE RANDOM mg/dL 107 140     Results from last 7 days   Lab Units 06/17/22  0946   INR  1 00     Results from last 7 days   Lab Units 06/17/22  0950   POC GLUCOSE mg/dl 120                   * I Have Reviewed All Lab Data Listed Above  * Additional Pertinent Lab Tests Reviewed: All Labs For Current Hospital Admission Reviewed    Imaging:    CT head without contrast    Result Date: 6/17/2022  Impression: No acute intracranial abnormality  Workstation performed: [id number]       Recent Cultures (last 7 days):           Last 24 Hours Medication List:   Current Facility-Administered Medications   Medication Dose Route Frequency Provider Last Rate    acetaminophen  650 mg Oral Q6H PRN Julio Grover MD      clonazePAM  1 mg Oral BID PRN Julio Grover MD      DULoxetine  60 mg Oral Daily Julio Grover MD      ondansetron  4 mg Intravenous Q6H PRN Julio Grover MD      QUEtiapine  300 mg Oral BID Julio Grover MD      traZODone  100 mg Oral HS PRN Julio Grover MD          Today, Patient Was Seen By: Julio Grover MD    ** Please Note: Dictation voice to text software may have been used in the creation of this document   **

## 2022-06-19 NOTE — ASSESSMENT & PLAN NOTE
70-year-old female with past medical history of depression, asthma, pituitary mass presented with altered mental status, lethargy   reports that patient had been found after leaving patient alone for brief moment lethargic, obtunded with open bottle of pills  Suspect seroquel and ambien overdose, but also takes clonazepam and cymbalta  Toxicology recommendations appreciated  Resume seroquel cymbalta, clonazepam prn  Psych consulted, appreciate recommendations, will need inpatient psych  Continue 1:1  Agreeable and signed 61 51 81, medically clear for discharge  Plan for discharge to inpatient psych tomorrow

## 2022-06-20 VITALS
SYSTOLIC BLOOD PRESSURE: 109 MMHG | RESPIRATION RATE: 20 BRPM | WEIGHT: 190.7 LBS | DIASTOLIC BLOOD PRESSURE: 68 MMHG | TEMPERATURE: 98.4 F | OXYGEN SATURATION: 97 % | BODY MASS INDEX: 35.09 KG/M2 | HEART RATE: 88 BPM | HEIGHT: 62 IN

## 2022-06-20 PROCEDURE — 99239 HOSP IP/OBS DSCHRG MGMT >30: CPT | Performed by: STUDENT IN AN ORGANIZED HEALTH CARE EDUCATION/TRAINING PROGRAM

## 2022-06-20 RX ORDER — CLONAZEPAM 2 MG/1
1 TABLET ORAL 2 TIMES DAILY PRN
Qty: 10 TABLET | Refills: 0
Start: 2022-06-20 | End: 2022-06-30

## 2022-06-20 RX ORDER — QUETIAPINE FUMARATE 300 MG/1
600 TABLET, FILM COATED ORAL 2 TIMES DAILY
Refills: 0
Start: 2022-06-20

## 2022-06-20 RX ORDER — TRAZODONE HYDROCHLORIDE 100 MG/1
100 TABLET ORAL
Refills: 0
Start: 2022-06-20

## 2022-06-20 RX ADMIN — DULOXETINE HYDROCHLORIDE 60 MG: 60 CAPSULE, DELAYED RELEASE ORAL at 07:57

## 2022-06-20 RX ADMIN — QUETIAPINE FUMARATE 300 MG: 300 TABLET ORAL at 07:57

## 2022-06-20 NOTE — NURSING NOTE
Call placed to receiving facility  No report needed per nurse  IV and sudhir removed  Patient discharged in paper scrubs  Belongings, paperwork and 12 given to transport team  [facility] to provide transportation to [facility]

## 2022-06-20 NOTE — ASSESSMENT & PLAN NOTE
59-year-old female with past medical history of depression, asthma, pituitary mass presented with altered mental status, lethargy   reports that patient had been found after leaving patient alone for brief moment lethargic, obtunded with open bottle of pills  Suspect seroquel and ambien overdose, but also takes clonazepam and cymbalta  Toxicology recommendations appreciated  Resume seroquel cymbalta, clonazepam prn  Psych consulted, appreciate recommendations, will need inpatient psych  Continue 1:1  Agreeable and signed 12, medically clear for discharge  Discharge to inpatient psych in [place]

## 2022-06-20 NOTE — DISCHARGE SUMMARY
[facility]  Discharge- [place] 1975, 52 y o  female MRN: [id number]  Unit/Bed#: [address]-01 Encounter: [id number]  Primary Care Provider: Jose M Zarate   Date and time admitted to hospital: 6/17/2022  9:19 AM    * Intentional drug overdose [facility])  Assessment & Plan  44-year-old female with past medical history of depression, asthma, pituitary mass presented with altered mental status, lethargy   reports that patient had been found after leaving patient alone for brief moment lethargic, obtunded with open bottle of pills  Suspect seroquel and ambien overdose, but also takes clonazepam and cymbalta  Toxicology recommendations appreciated  Resume seroquel cymbalta, clonazepam prn  Psych consulted, appreciate recommendations, will need inpatient psych  Continue 1:1  Agreeable and signed 201, medically clear for discharge  Discharge to inpatient psych in [place]      Severe episode of recurrent major depressive disorder, with psychotic features ([facility] [address] )  Assessment & Plan  Maintained on Cymbalta and Seroquel continued    Hypokalemia  Assessment & Plan  repleted    Mild intermittent asthma without complication  Assessment & Plan  No acute asthma exacerbation   Not on maintenance inhaler    Pituitary mass [facility])  Assessment & Plan  History of pituitary mass, followed outpatient by Neurosurgery  Stable mass noted on MRI in May  Continue outpatient follow-up with Neurosurgery        Discharging Physician / Practitioner: Jase Hart MD  PCP: Jose M Zarate  Admission Date:   Admission Orders (From admission, onward)     Ordered        06/19/22 1648  Inpatient Admission  Once            06/17/22 1250  Place in Observation  Once                      Discharge Date: 06/20/22    Medical Problems             Resolved Problems  Date Reviewed: 6/20/2022   None                 Consultations During Hospital Stay:  · Psychiatry  · Toxicology    Procedures Performed: · none    Significant Findings / Test Results:   CT head without contrast    Result Date: 6/17/2022  · Impression: No acute intracranial abnormality  Workstation performed: [id number]   ·     Incidental Findings:   · none     Test Results Pending at Discharge (will require follow up):   · none     Outpatient Tests Requested:  · none    Complications:  none    Reason for Admission: Intentional Overdose    Hospital Course:     Dipika Ryder is a 52 y o  female patient who originally presented to the hospital on 6/17/2022 due to intentional drug overdose  Patient was evaluate by toxicology with recommendations for monitoring due to patient being Seroquel, benzos, Ambien and Cymbalta  His sitter was in place, to observe patient given likely intentional overdose  She did have prior history of accidental overdose 1 month ago  No events on telemetry, EKG without changes  As patient was medically clear, Psychiatry was consulted recommending we continuing prior home medications  Patient was agreeable to signing a 201 and discharged to inpatient psych  Please see above list of diagnoses and related plan for additional information  Condition at Discharge: fair     Discharge Day Visit / Exam:     Subjective:  No events overnight, no complaints  Tolerating diet  Vitals: Blood Pressure: 109/68 (06/19/22 2201)  Pulse: 88 (06/19/22 2201)  Temperature: 98 4 °F (36 9 °C) (06/19/22 2201)  Temp Source: Oral (06/19/22 2201)  Respirations: 20 (06/19/22 2201)  Height: 5' 2" (157 5 cm) (06/17/22 0932)  Weight - Scale: 86 5 kg (190 lb 11 2 oz) (06/20/22 0540)  SpO2: 97 % (06/19/22 2201)  Exam:   Physical Exam  Vitals and nursing note reviewed  Constitutional:       General: She is not in acute distress  Appearance: Normal appearance  She is normal weight  She is not ill-appearing  HENT:      Head: Normocephalic and atraumatic     Eyes:      Conjunctiva/sclera: Conjunctivae normal       Pupils: Pupils are equal, round, and reactive to light  Cardiovascular:      Rate and Rhythm: Normal rate  Pulses: Normal pulses  Pulmonary:      Effort: Pulmonary effort is normal  No respiratory distress  Breath sounds: No wheezing  Abdominal:      General: Bowel sounds are normal  There is no distension  Palpations: Abdomen is soft  Musculoskeletal:         General: No swelling  Right lower leg: No edema  Left lower leg: No edema  Skin:     General: Skin is warm and dry  Neurological:      General: No focal deficit present  Mental Status: Mental status is at baseline  Discussion with Family: patient    Discharge instructions/Information to patient and family:   See after visit summary for information provided to patient and family  Provisions for Follow-Up Care:  See after visit summary for information related to follow-up care and any pertinent home health orders  Disposition:     Inpatient Psych in [address]    Planned Readmission: none     Discharge Statement:  I spent 35 minutes discharging the patient  This time was spent on the day of discharge  I had direct contact with the patient on the day of discharge  Greater than 50% of the total time was spent examining patient, answering all patient questions, arranging and discussing plan of care with patient as well as directly providing post-discharge instructions  Additional time then spent on discharge activities  Discharge Medications:  See after visit summary for reconciled discharge medications provided to patient and family        ** Please Note: This note has been constructed using a voice recognition system **

## 2022-06-20 NOTE — UTILIZATION REVIEW
Inpatient Admission Authorization Request   NOTIFICATION OF INPATIENT ADMISSION/INPATIENT AUTHORIZATION REQUEST   SERVICING FACILITY:   [address] [place] [address], [address]   Tax ID: [id number]  NPI: [id number]  Place of Service: Inpatient [facility]  [address]  Place of Service Code: 24     ATTENDING PROVIDER:  Attending Name and NPI#: Guanakito Alejo [place] [[id number]]  Address: [address] [place] [address], [address]   Phone: [phone]     UTILIZATION REVIEW CONTACT:  Daphne Aleman, Utilization   Network Utilization Review Department  Phone: [phone]  Fax: [phone]  Email: [email or web address]     PHYSICIAN ADVISORY SERVICES:  FOR RDZT-GF-TQEE REVIEW - MEDICAL NECESSITY DENIAL  Phone: [phone]  Fax: [phone]  Email: [email or web address]     TYPE OF REQUEST:  Inpatient Status     ADMISSION INFORMATION:  ADMISSION DATE/TIME: 6/19/22  4:48 PM  PATIENT DIAGNOSIS CODE/DESCRIPTION:  Overdose [T50 901A]  DISCHARGE DATE/TIME: No discharge date for patient encounter  IMPORTANT INFORMATION:  Please contact the Daphne Aleman directly with any questions or concerns regarding this request  Department voicemails are confidential     Send requests for admission clinical reviews, concurrent reviews, approvals, and administrative denials due to lack of clinical to fax [phone]

## 2022-06-20 NOTE — UTILIZATION REVIEW
Notification of Discharge   This is a Notification of Discharge from our facility [address]  Please be advised that this patient has been discharge from our facility  Below you will find the admission and discharge date and time including the patients disposition  UTILIZATION REVIEW CONTACT:  Harpreet Jewell  Utilization   Network Utilization Review Department  Phone: [phone] x carefully listen to the prompts  All voicemails are confidential   Email: [email or web address]     PHYSICIAN ADVISORY SERVICES:  FOR OSWZ-FU-ZVEL REVIEW - MEDICAL NECESSITY DENIAL  Phone: [phone]  Fax: [phone]  Email: [email or web address]     PRESENTATION DATE: 6/17/2022  9:19 AM    INPATIENT ADMISSION DATE: 6/19/22  4:48 PM   DISCHARGE DATE: 6/20/2022  8:15 AM  DISPOSITION: Non [facility] Non [facility]      IMPORTANT INFORMATION:  Send all requests for admission clinical reviews, approved or denied determinations and any other requests to dedicated fax number below belonging to the campus where the patient is receiving treatment   List of dedicated fax numbers:  [address] DENIALS (Administrative/Medical Necessity) [phone]   [address] (Maternity/NICU/Pediatrics) [phone]   [facility] [phone]   130 [facility] [phone]   [address] [place] [address] [phone]   2000 [facility] [address] [place] [address],[address] [phone]   [facility]  [phone]   [address] [place] [address], [address]  2401 [facility] 1000 [facility] [phone]

## 2024-01-01 NOTE — DISCHARGE INSTRUCTIONS
Afebrile. VSS. No apneic episodes. Patient appeared to be uncomfortable before bed, so started keeping up with prn tylenol to keep pain under control, pt appears to be comfortable and in no pain. Good PO intake, no N/V. UO adequate. No stool. Mom at bedside participating in cares in evening, said she would be here in the morning. Hourly rounding completed.      Goal Outcome Evaluation:      Plan of Care Reviewed With: parent    Overall Patient Progress: improving          Bronquitis aguda   LO QUE NECESITA SABER:   La bronquitis aguda es la inflamación e irritación de antonieta vías respiratorias  Esta irritación puede provocar que tosa o que tenga otros problemas de respiración  La bronquitis aguda suele comenzar debido a otra enfermedad, keaton un resfriado o la gripe  La enfermedad se propaga de quinn nariz y garganta a quinn tráquea y Diana Nabor  La bronquitis a menudo es conocida keaton resfriado de pecho  La bronquitis aguda generalmente dura alrededor de 3 a 6 semanas y no es boo enfermedad grave  La tos podría durar por varias semanas  INSTRUCCIONES SOBRE [facility]OSPITALARIA:   Regrese a la bonita de emergencias si:   · Usted expectora lolis  · Antonieta labios o uñas de los dedos se ponen azules  · Usted siente que no está recibiendo suficiente aire cuando respira  Pregúntele a quinn Abiel Helen vitaminas y minerales son adecuados para usted  · Usted tiene fiebre  · Antonieta problemas respiratorios no desaparecen o empeoran  · Quinn tos no mejora dentro de 4 semanas  · Usted tiene preguntas o inquietudes acerca de quinn condición o cuidado  Cuidados personales:   · Descanse más  El descanso ayuda a quinn cuerpo a sanar  Empiece a hacer un poco más día a día  Descanse cuando usted sienta que es necesario  · Evite irritantes en el aire  Evite productos químicos, gases y polvo  Use boo mascarilla si tiene que trabajar alrededor de polvo o gases  Permanezca dentro cuando los niveles de contaminación irais altos  Si tiene alergias, permanezca adentro cuando el conteo de polen sea CROSSCANONBY  No use productos en aerosol, keaton desodorante, aerosol contra los insectos y aerosol para el faye  · No fume o esté alrededor de personas que fuman  La nicotina y otros químicos en los cigarrillos y cigarros dañan la cilia que saca la mucosidad de antonieta pulmones  Pida información a quinn médico si usted actualmente fuma y necesita ayuda para dejar de fumar   Los cigarrillos electrónicos o tabaco sin humo todavía contienen nicotina  Consulte con quinn médico antes de [employer]  · [address] se le haya indicado  Los líquidos ayudan a mantener humectadas hui vías respiratorias y ayudarle a eliminar las flemas por medio de la tos  Es posible que usted necesite bruce más líquidos si tiene bronquitis [place]  Pregunte cuánto líquido debe bruce cada día y cuáles líquidos son los más adecuados para usted  · Use un humidificador o vaporizador  Use un humidificador de trevor frío o un vaporizador para elevar la humedad en quinn casa  [place] podría ayudarle a respirar más fácilmente y al mismo tiempo disminuir la tos  Disminuya quinn riesgo para la bronquitis aguda:   · Vaya a que le pongan las vacunas necesarias  Pregúntele a quinn médico si usted debería ser vacunado contra la gripe o la neumonía  · Evite la propagación de gérmenes  Usted puede disminuir quinn riesgo de bronquitis [place] y otras enfermedades de la siguiente manera:     ¨ [place] frecuentemente con agua y Arnie  Lleve boo loción o gel antiséptico para las scotty  Usted puede usar la loción o el gel para limpiar hui scotty cuando no haya agua disponible  ¨ No se toque los ojos, la nariz o la boca a menos que se haya lavado las scotty efren  ¨ Siempre cubra quinn boca al toser para evitar la propagación de gérmenes  Lo mejor es toser en un pañuelo desechable o en la manga de quinn camisa, en lugar de en quinn mano  [place]dale a los que le rodean que cubran hui bocas al toser  ¨ Trate de evitar a las personas que están resfriadas o tienen gripe  Si usted está enfermo, manténgase alejado de otras personas lo más que pueda  Medicamentos:  Quinn médico podría  darle cualquiera de lo siguiente:  · El ibuprofeno o el acetaminofeno  son medicamentos que pueden ayudar a bajar quinn fiebre  Están disponibles sin receta médica  Consulte con quinn médico cuál medicamento es el adecuado para usted  Pregunte la cantidad y la frecuencia con que debe tomarlos  [address]  Estos medicamentos pueden provocar sangrado estomacal si no se sarkis correctamente  El ibuprofeno puede provocar daño al Rexine Child  No tome ibuprofeno si usted tiene enfermedad de los riñones, Jose o si es alérgico a la aspirina  El acetaminofeno puede dañar el hígado  No tome más de 4,000 miligramos en 24 horas  · Los descongestionantes  ayudan a despejar la mucosidad en hui pulmones y que sea más fácil expectorarla  [place] puede ayudarle a respirar mejor  · Los jarabes para la tos  disminuyen quinn necesidad de toser  Si quinn tos produce mucosidad, no tome un supresor de la tos a menos que quinn médico se lo indique  Quinn médico podría sugerirle que tome un supresor de la tos en la noche para que pueda descansar  · Inhaladores  podrían ser Carmelita Nadeem  Quinn médico podría darle terrie o más inhaladores para ayudarle a respirar más fácil y [place] menos tos  Un inhalador le administra medicamento para abrir hiu vías aéreas  Pídale a quinn médico que le muestre cómo usar quinn inhalador correctamente  · [place] hui medicamentos keaton se le haya indicado  Consulte con quinn médico si usted parish que quinn medicamento no le está ayudando o si presenta efectos secundarios  Infórmele si es alérgico a algún medicamento  Mantenga boo lista actualizada de los [place], las vitaminas y los productos herbales que kelsi  Incluya los siguientes datos de los medicamentos: cantidad, frecuencia y motivo de administración  Traiga con usted la lista o los envases de la píldoras a hui citas de seguimiento  Lleve la lista de los medicamentos con usted en reagan de boo emergencia  Acuda a hui consultas de control con quinn médico según le indicaron  Escriba las preguntas que tenga para que recuerde hacerlas quita hui citas de seguimiento  © 2017 [address] Information is for End User's use only and may not be sold, redistributed or otherwise used for commercial purposes   All illustrations and images included in [employer] are the copyrighted property of [employer]  or Dmitriy Swenson  Esta información es sólo para uso en educación  Quinn intención no es darle un consejo médico sobre enfermedades o tratamientos  Colsulte con quinn Petra Angst farmacéutico antes de seguir cualquier régimen médico para saber si es seguro y efectivo para usted

## 2024-04-25 ENCOUNTER — APPOINTMENT (EMERGENCY)
Dept: RADIOLOGY | Facility: HOSPITAL | Age: 49
End: 2024-04-25
Payer: COMMERCIAL

## 2024-04-25 ENCOUNTER — HOSPITAL ENCOUNTER (EMERGENCY)
Facility: HOSPITAL | Age: 49
Discharge: HOME | End: 2024-04-25
Attending: EMERGENCY MEDICINE
Payer: COMMERCIAL

## 2024-04-25 VITALS
RESPIRATION RATE: 22 BRPM | SYSTOLIC BLOOD PRESSURE: 134 MMHG | WEIGHT: 209.88 LBS | HEART RATE: 71 BPM | DIASTOLIC BLOOD PRESSURE: 74 MMHG | TEMPERATURE: 97.9 F | BODY MASS INDEX: 38.39 KG/M2 | OXYGEN SATURATION: 98 %

## 2024-04-25 DIAGNOSIS — S82.851A CLOSED TRIMALLEOLAR FRACTURE OF RIGHT ANKLE, INITIAL ENCOUNTER: Primary | ICD-10-CM

## 2024-04-25 PROCEDURE — 99285 EMERGENCY DEPT VISIT HI MDM: CPT | Performed by: EMERGENCY MEDICINE

## 2024-04-25 PROCEDURE — 96375 TX/PRO/DX INJ NEW DRUG ADDON: CPT

## 2024-04-25 PROCEDURE — 73610 X-RAY EXAM OF ANKLE: CPT

## 2024-04-25 PROCEDURE — 27818 TREATMENT OF ANKLE FRACTURE: CPT | Performed by: EMERGENCY MEDICINE

## 2024-04-25 PROCEDURE — 99283 EMERGENCY DEPT VISIT LOW MDM: CPT

## 2024-04-25 PROCEDURE — 99152 MOD SED SAME PHYS/QHP 5/>YRS: CPT | Performed by: EMERGENCY MEDICINE

## 2024-04-25 PROCEDURE — 96374 THER/PROPH/DIAG INJ IV PUSH: CPT

## 2024-04-25 PROCEDURE — 73600 X-RAY EXAM OF ANKLE: CPT

## 2024-04-25 RX ORDER — IBUPROFEN 600 MG/1
600 TABLET ORAL EVERY 6 HOURS PRN
Qty: 28 TABLET | Refills: 0 | Status: SHIPPED | OUTPATIENT
Start: 2024-04-25 | End: 2024-05-02

## 2024-04-25 RX ORDER — PROPOFOL 10 MG/ML
150 INJECTION, EMULSION INTRAVENOUS ONCE
Status: COMPLETED | OUTPATIENT
Start: 2024-04-25 | End: 2024-04-25

## 2024-04-25 RX ORDER — FENTANYL CITRATE 50 UG/ML
2 INJECTION, SOLUTION INTRAMUSCULAR; INTRAVENOUS ONCE
Status: COMPLETED | OUTPATIENT
Start: 2024-04-25 | End: 2024-04-25

## 2024-04-25 RX ORDER — ACETAMINOPHEN 325 MG/1
975 TABLET ORAL ONCE
Status: COMPLETED | OUTPATIENT
Start: 2024-04-25 | End: 2024-04-25

## 2024-04-25 RX ORDER — HYDROMORPHONE HCL/PF 1 MG/ML
0.5 SYRINGE (ML) INJECTION ONCE
Status: COMPLETED | OUTPATIENT
Start: 2024-04-25 | End: 2024-04-25

## 2024-04-25 RX ORDER — OXYCODONE HYDROCHLORIDE 5 MG/1
5 TABLET ORAL EVERY 4 HOURS PRN
Qty: 30 TABLET | Refills: 0 | Status: SHIPPED | OUTPATIENT
Start: 2024-04-25 | End: 2024-05-05

## 2024-04-25 RX ORDER — SENNOSIDES 8.6 MG
650 CAPSULE ORAL EVERY 8 HOURS PRN
Qty: 30 TABLET | Refills: 0 | Status: SHIPPED | OUTPATIENT
Start: 2024-04-25

## 2024-04-25 RX ORDER — KETOROLAC TROMETHAMINE 30 MG/ML
15 INJECTION, SOLUTION INTRAMUSCULAR; INTRAVENOUS ONCE
Status: COMPLETED | OUTPATIENT
Start: 2024-04-25 | End: 2024-04-25

## 2024-04-25 RX ORDER — OXYCODONE HYDROCHLORIDE 5 MG/1
5 TABLET ORAL ONCE
Status: COMPLETED | OUTPATIENT
Start: 2024-04-25 | End: 2024-04-25

## 2024-04-25 RX ORDER — ONDANSETRON 2 MG/ML
1 INJECTION INTRAMUSCULAR; INTRAVENOUS ONCE
Status: COMPLETED | OUTPATIENT
Start: 2024-04-25 | End: 2024-04-25

## 2024-04-25 RX ADMIN — HYDROMORPHONE HYDROCHLORIDE 0.5 MG: 1 INJECTION, SOLUTION INTRAMUSCULAR; INTRAVENOUS; SUBCUTANEOUS at 19:10

## 2024-04-25 RX ADMIN — ACETAMINOPHEN 975 MG: 325 TABLET, FILM COATED ORAL at 20:44

## 2024-04-25 RX ADMIN — OXYCODONE 5 MG: 5 TABLET ORAL at 20:44

## 2024-04-25 RX ADMIN — KETOROLAC TROMETHAMINE 15 MG: 30 INJECTION, SOLUTION INTRAMUSCULAR; INTRAVENOUS at 19:57

## 2024-04-25 RX ADMIN — PROPOFOL 150 MG: 10 INJECTION, EMULSION INTRAVENOUS at 19:37

## 2024-04-25 NOTE — Clinical Note
Latha Liu was seen and treated in our emergency department on 4/25/2024.                Diagnosis:     Latha  may return to work on return date.    She may return on this date: 04/23/2024    Strict non-weight bearing on right foot     If you have any questions or concerns, please don't hesitate to call.      Opal Sanchez MD    ______________________________           _______________          _______________  Hospital Representative                              Date                                Time

## 2024-04-26 ENCOUNTER — TELEPHONE (OUTPATIENT)
Age: 49
End: 2024-04-26

## 2024-04-26 NOTE — ED ATTENDING ATTESTATION
4/25/2024  I, Gwen Schultz, , saw and evaluated the patient. I have discussed the patient with the resident/non-physician practitioner and agree with the resident's/non-physician practitioner's findings, Plan of Care, and MDM as documented in the resident's/non-physician practitioner's note, except where noted. All available labs and Radiology studies were reviewed.  I was present for key portions of any procedure(s) performed by the resident/non-physician practitioner and I was immediately available to provide assistance.       At this point I agree with the current assessment done in the Emergency Department.  I have conducted an independent evaluation of this patient a history and physical is as follows:49y F here for ankle injury.  Went down metal slid and injured ankle on landing/hitting the ground. Immed pain, +visible deformity.  Last ate around 1430. C/o 10/10 pain to the ankle. No previous injuries/surgeries to the ankle. No other injuries or complaints. Exam. WNWD nad, mmm, neck supple, resp non-labored, cv regular rate, abd nd, ext R ankle visibly deformed w/ foot/ankle deviated laterally, some faint tenting over the distal tibial condyle, mild paresthesias laterally, DP pulse 2/4., skin pink, dry CR <2 sec, neuro non-focal, normal mood.  A/P Ankle injury - likely fracture/dislocation given appearance.  Will get xray, sedate for reduction and splint      ED Course     XR ankle 2 views RIGHT   ED Interpretation   Abnormal   Xray reviewed and independently interpreted by me: post reduction, improved alignment      XR ankle 3+ views RIGHT   ED Interpretation   Abnormal   Xray reviewed and independently interpreted by me: trimalleolar fracture/dislocation            Critical Care Time  Procedures    1. Closed trimalleolar fracture of right ankle, initial encounter  acetaminophen (TYLENOL) 650 mg CR tablet    ibuprofen (MOTRIN) 600 mg tablet    oxyCODONE (Roxicodone) 5 immediate release tablet     Ambulatory Referral to Orthopedic Surgery        Time reflects when diagnosis was documented in both MDM as applicable and the Disposition within this note       Time User Action Codes Description Comment    4/25/2024  8:13 PM Opal Sanchez Add [S82.851A] Closed trimalleolar fracture of right ankle, initial encounter           ED Disposition       ED Disposition   Discharge    Condition   Stable    Date/Time   u Apr 25, 2024  8:13 PM    Comment   Latha Liu discharge to home/self care.                   Follow-up Information    None

## 2024-04-26 NOTE — DISCHARGE INSTRUCTIONS
You have an unstable fracture and dislocation of your right ankle. The dislocation was fixed in the ER, but do not bear weight on this foot as there is a high risk it would dislocate again.     Please follow up with the foot doctor as soon as possible; call them tomorrow to schedule an appointment.     You can alternate acetaminophen 1000mg and ibuprofen 600mg every 3 hours for pain.  Try to time your ibuprofen so you can take it when you eat.  Additionally you can apply ice for 15-20 minutes every 1-2 hours while awake for additional pain control.      If you are still having significant pain despite the above measures, you can take oxycodone 5mg every 4-6 hours for breakthrough/severe pain.  No driving / operating machinery while taking this medication . It will cause constipation, so be sure to start a stool softener and a laxative immediately.    Tiene boo fractura inestable y boo dislocación del tobillo derecho. La dislocación se solucionó en urgencias, toby no apoye peso sobre radhika pie ya que existe un alto riesgo de que se disloque nuevamente.    Rigo un seguimiento con el podólogo lo antes posible; Llámelos mañana para programar boo troy.    Puede alternar paracetamol 1000 mg e ibuprofeno 600 mg cada 3 horas para el dolor. Intente programar quinn ibuprofeno para poder tomarlo cuando coma. Además, puede aplicar hielo quita 15 a 20 minutos cada 1 a 2 horas mientras está despierto para controlar aún más el dolor.    Si todavía tiene un dolor significativo a pesar de las medidas anteriores, puede bruce 5 mg de oxicodona cada 4 a 6 horas para el dolor irruptivo o intenso. No conduzca ni opere maquinaria mientras esté tomando radhika medicamento. Causará estreñimiento, así que asegúrese de comenzar a bruce un ablandador de heces y un laxante de inmediato.

## 2024-04-26 NOTE — TELEPHONE ENCOUNTER
Hello,  Please advise if the following patient can be forced onto the schedule:    Patient: Patient    : 75    MRN: [id number]    Call back #: [phone] Alphonso/boyfriend    Insurance: [employer]    Reason for appointment: fx'd right ankle    Requested doctor and/or location: [place]-patient declined appt w/Dr Lachman. Needs [place] offices only      Thank you.

## 2024-04-26 NOTE — ED PROVIDER NOTES
History  Chief Complaint   Patient presents with    Ankle Injury     Patient reports going down slide and injuring right ankle. 150mcg fentanyl given by ems     HPI    Latha Liu is a 49 y.o. female presenting with trimaleolar R ankle fracture. Patient states she slid down a metal slide and landed on her foot at and odd angle, ultimately resulting in injury. She arrived to [facility] by EMS. The foot is obviously with a fracture-dislocation general survey; sensation and motor function in tact though patient complains of some paresthesia along lateral malleolus and lateral foot.     Prior to Admission Medications   Prescriptions Last Dose Informant Patient Reported? Taking?   Cholecalciferol (Vitamin D3) 50 MCG (2000 UT) TABS 4/24/2024  Yes Yes   Sig: Take 2,000 Units by mouth daily   DULoxetine (CYMBALTA) 20 mg capsule 4/24/2024  Yes Yes   Sig: Take 60 mg by mouth daily   QUEtiapine (SEROquel) 300 mg tablet 4/24/2024  No Yes   Sig: Take 2 tablets (600 mg total) by mouth 2 (two) times a day   clonazePAM (KlonoPIN) 2 mg tablet   No No   Sig: Take 0.5 tablets (1 mg total) by mouth 2 (two) times a day as needed for seizures for up to 10 days   estradiol (ESTRACE) 1 mg tablet 4/24/2024  Yes Yes   Sig: Take 2 mg by mouth daily   naproxen (NAPROSYN) 500 mg tablet Not Taking  No No   Sig: Take 1 tablet (500 mg total) by mouth every 12 (twelve) hours as needed for mild pain or moderate pain   Patient not taking: Reported on 4/25/2024   traZODone (DESYREL) 100 mg tablet 4/24/2024  No Yes   Sig: Take 1 tablet (100 mg total) by mouth daily at bedtime as needed for sleep      Facility-Administered Medications: None       Past Medical History:   Diagnosis Date    Asthma     Migraine     Pituitary tumor     Psychiatric disorder     Depression       Past Surgical History:   Procedure Laterality Date    HYSTERECTOMY         Family History   Problem Relation Age of Onset    No Known Problems Mother     Lymphoma Sister     No Known  Problems Maternal Grandmother     No Known Problems Paternal Grandmother     No Known Problems Maternal Aunt     No Known Problems Maternal Aunt      I have reviewed and agree with the history as documented.    E-Cigarette/Vaping    E-Cigarette Use Current Every Day User      E-Cigarette/Vaping Substances    Nicotine Yes vape    THC No     CBD No     Flavoring No     Other No     Unknown No      Social History     Tobacco Use    Smoking status: Never    Smokeless tobacco: Never   Vaping Use    Vaping status: Every Day    Substances: Nicotine (vape)   Substance Use Topics    Alcohol use: No    Drug use: No        Review of Systems   Musculoskeletal:  Positive for gait problem.   Skin:  Positive for color change and wound.   Neurological:  Negative for numbness.       Physical Exam  ED Triage Vitals [04/25/24 1851]   Temperature Pulse Respirations Blood Pressure SpO2   97.9 °F (36.6 °C) 80 16 104/60 96 %      Temp Source Heart Rate Source Patient Position - Orthostatic VS BP Location FiO2 (%)   Oral Monitor Lying Right arm --      Pain Score       10 - Worst Possible Pain             Orthostatic Vital Signs  Vitals:    04/25/24 1955 04/25/24 2000 04/25/24 2015 04/25/24 2030   BP: 117/74 127/81 127/83 134/74   Pulse: 80 73 74 71   Patient Position - Orthostatic VS: Lying Lying Lying Lying       Physical Exam  Constitutional:       General: She is in acute distress.   HENT:      Head: Normocephalic and atraumatic.   Cardiovascular:      Rate and Rhythm: Normal rate and regular rhythm.      Pulses: Normal pulses.   Musculoskeletal:         General: Tenderness and signs of injury present.      Comments: Photos in media  Obvious deformity to the R foot with skin tenting just anterior to medial malleolus. DP and PT pulses clear on doppler. Patient able to wiggle toes and sensation in tact. Patient reports paresthesia and decreased sensation along right lateral malleolus and lateral foot  No skin injury to indicate open  fracture    Skin:     General: Skin is warm and dry.      Capillary Refill: Capillary refill takes less than 2 seconds.      Findings: No lesion.   Neurological:      Mental Status: She is alert.         ED Medications  Medications   fentanyl citrate (PF) (FOR EMS ONLY) 100 mcg/2 mL injection 200 mcg (0 mcg Does not apply Given to EMS 4/25/24 1900)   ondansetron (FOR EMS ONLY) (ZOFRAN) 4 mg/2 mL injection 4 mg (0 mg Does not apply Given to EMS 4/25/24 1900)   HYDROmorphone (DILAUDID) injection 0.5 mg (0.5 mg Intravenous Given 4/25/24 1910)   propofol (DIPRIVAN) 200 MG/20ML bolus injection 150 mg (150 mg Intravenous Given by Other 4/25/24 1937)   ketorolac (TORADOL) injection 15 mg (15 mg Intravenous Given 4/25/24 1957)   acetaminophen (TYLENOL) tablet 975 mg (975 mg Oral Given 4/25/24 2044)   oxyCODONE (ROXICODONE) IR tablet 5 mg (5 mg Oral Given 4/25/24 2044)       Diagnostic Studies  Results Reviewed       None                   XR ankle 2 views RIGHT   ED Interpretation by Gwen Schultz DO (04/25 2025)   Abnormal   Xray reviewed and independently interpreted by me: post reduction, improved alignment      XR ankle 3+ views RIGHT   ED Interpretation by Gwen Schultz DO (04/25 2025)   Abnormal   Xray reviewed and independently interpreted by me: trimalleolar fracture/dislocation            Procedures  Pre-Procedural Sedation    Performed by: Opal Sanchez MD  Authorized by: Opal Sanchez MD    Consent:     Consent obtained:  Verbal and written    Consent given by:  Patient    Risks discussed:  Allergic reaction, prolonged hypoxia resulting in organ damage, inadequate sedation, nausea, vomiting and respiratory compromise necessitating ventilatory assistance and intubation    Alternatives discussed:  Analgesia without sedation  Universal protocol:     Patient identity confirmation method:  Arm band and verbally with patient  Indications:     Sedation purpose:  Dislocation reduction    Procedure necessitating  sedation performed by:  Different physician    Intended level of sedation:  Moderate (conscious sedation)  Pre-sedation assessment:     Time since last food or drink:  1430    ASA classification: class 2 - patient with mild systemic disease      Neck mobility: normal      Mouth opening:  3 or more finger widths    Thyromental distance:  3 finger widths    Mallampati score:  I - soft palate, uvula, fauces, pillars visible    Pre-sedation assessments completed and reviewed: airway patency not reviewed, cardiovascular function not reviewed, hydration status not reviewed, mental status not reviewed, nausea/vomiting not reviewed, pain level not reviewed and respiratory function not reviewed      History of difficult intubation: no      Pre-sedation assessment completed:  4/25/2024 7:34 PM  Procedural Sedation    Date/Time: 4/25/2024 8:05 PM    Performed by: Opal Sanchez MD  Authorized by: Opal Sanchez MD    Immediate pre-procedure details:     Reassessment: Patient reassessed immediately prior to procedure      Reviewed: vital signs, relevant labs/tests and NPO status      Verified: bag valve mask available, emergency equipment available, intubation equipment available, IV patency confirmed, oxygen available, reversal medications available and suction available    Procedure details (see MAR for exact dosages):     Sedation start time:  4/25/2024 7:50 PM    Preoxygenation:  Nasal cannula    Sedation:  Propofol    Analgesia:  None    Intra-procedure monitoring:  Blood pressure monitoring, continuous capnometry, frequent LOC assessments, frequent vital sign checks, continuous pulse oximetry and cardiac monitor    Intra-procedure events: none      Sedation end time:  4/25/2024 8:06 PM    Total sedation time (minutes):  16  Post-procedure details:     Post-sedation assessment completed:  4/25/2024 8:06 PM    Attendance: Constant attendance by certified staff until patient recovered      Recovery: Patient returned to  "pre-procedure baseline      Post-sedation assessments completed and reviewed: post-procedure airway patency not reviewed, post-procedure cardiovascular function not reviewed, post-procedure mental status not reviewed, post-procedure nausea and vomiting status not reviewed, pain score not reviewed and post-procedure respiratory function not reviewed      Patient is stable for discharge or admission: yes      Patient tolerance:  Tolerated well, no immediate complications  Orthopedic injury treatment    Date/Time: 4/25/2024 8:10 PM    Performed by: Opal Sanchez MD  Authorized by: Opal Sanchez MD    Patient Location:  ED  [place] Protocol:  Procedure performed by: (Dr. Schultz)  Consent: Verbal consent obtained.  Risks and benefits: risks, benefits and alternatives were discussed  Consent given by: patient  Time out: Immediately prior to procedure a \"time out\" was called to verify the correct patient, procedure, equipment, support staff and site/side marked as required.  Patient understanding: patient states understanding of the procedure being performed  Patient consent: the patient's understanding of the procedure matches consent given  Procedure consent: procedure consent matches procedure scheduled  Relevant documents: relevant documents present and verified  Radiology Images displayed and confirmed. If images not available, report reviewed: imaging studies available  Patient identity confirmed: verbally with patient    Injury location:  Ankle  Location details:  Right ankle  Injury type:  Fracture-dislocation  Fracture type: trimalleolar    Distal perfusion: normal    Neurological function: diminished    Range of motion: reduced    Local anesthesia used?: No    General anesthesia used?: No    Sedation type:  Moderate (conscious) sedation (See separate Procedural Sedation form)  Manipulation performed?: Yes    Skin traction used?: No    Skeletal traction used?: Yes    Reduction successful?: Yes    Confirmation: " Reduction confirmed by x-ray    Immobilization:  Splint  Splint type:  Short leg  Supplies used:  Ortho-Glass  Distal perfusion: normal    Neurological function: diminished    Neurological function comment:  Still with lateral foot paresthesia  Patient tolerance:  Patient tolerated the procedure well with no immediate complications        ED Course  ED Course as of 04/25/24 2254   Thu Apr 25, 2024 1904 DP and PT pulses found on doppler. Foot appears well perfused. Patient able to wiggle toes but endorses some numbness along lateral aspect of ankle    2016 Reduction successful                                SBIRT 22yo+      Flowsheet Row Most Recent Value   Initial Alcohol Screen: US AUDIT-C     1. How often do you have a drink containing alcohol? 0 Filed at: 04/25/2024 1909   2. How many drinks containing alcohol do you have on a typical day you are drinking?  0 Filed at: 04/25/2024 1909   3b. FEMALE Any Age, or MALE 65+: How often do you have 4 or more drinks on one occassion? 0 Filed at: 04/25/2024 1909   Audit-C Score 0 Filed at: 04/25/2024 1909   DELANEY: How many times in the past year have you...    Used an illegal drug or used a prescription medication for non-medical reasons? Never Filed at: 04/25/2024 1909                  Medical Decision Making  Amount and/or Complexity of Data Reviewed  Radiology: ordered and independent interpretation performed.    Risk  OTC drugs.  Prescription drug management.      Patient is a 49 y.o. female  who presents to the ED with right ankle injury.    Vital signs stable. Exam as listed above    Differential diagnosis includes but is not limited to talar dislocation vs fracture dislocation  (aleisha vs trimal)     Plan XR R foot,  procedural sedation, reduction, splinting, post-reduction xray, discharge with follow up with ortho. Patient given strict non-weightbearing instructions and provided with crutches.  Reduction was successful.     View ED course above for further  discussion on patient workup.    All labs reviewed and utilized in the medical decision making process  All radiology studies independently viewed by me and interpreted by the radiologist.  I reviewed all testing with the patient.     Upon re-evaluation patient with intact perfusion and sensation after splinting aside from R lateral foot numbness that was present prior to reduction. Patient discharge with tylenol, motrin, oxycodone.       Disposition  Final diagnoses:   Closed trimalleolar fracture of right ankle, initial encounter     Time reflects when diagnosis was documented in both MDM as applicable and the Disposition within this note       Time User Action Codes Description Comment    4/25/2024  8:13 PM Opal Sanchez Add [S82.851A] Closed trimalleolar fracture of right ankle, initial encounter           ED Disposition       ED Disposition   Discharge    Condition   Stable    Date/Time   u Apr 25, 2024 2013    Comment   aLtha Liu discharge to home/self care.                   Follow-up Information    None         Discharge Medication List as of 4/25/2024  8:52 PM        START taking these medications    Details   acetaminophen (TYLENOL) 650 mg CR tablet Take 1 tablet (650 mg total) by mouth every 8 (eight) hours as needed for mild pain, Starting u 4/25/2024, Normal      ibuprofen (MOTRIN) 600 mg tablet Take 1 tablet (600 mg total) by mouth every 6 (six) hours as needed for mild pain for up to 7 days, Starting Thu 4/25/2024, Until Thu 5/2/2024 at 2359, Normal      oxyCODONE (Roxicodone) 5 immediate release tablet Take 1 tablet (5 mg total) by mouth every 4 (four) hours as needed for severe pain for up to 10 days Max Daily Amount: 30 mg, Starting u 4/25/2024, Until Sun 5/5/2024 at 2359, Normal           CONTINUE these medications which have NOT CHANGED    Details   Cholecalciferol (Vitamin D3) 50 MCG (2000 UT) TABS Take 2,000 Units by mouth daily, Historical Med      DULoxetine (CYMBALTA) 20 mg  capsule Take 60 mg by mouth daily, Historical Med      estradiol (ESTRACE) 1 mg tablet Take 2 mg by mouth daily, Starting Wed 4/15/2015, Historical Med      QUEtiapine (SEROquel) 300 mg tablet Take 2 tablets (600 mg total) by mouth 2 (two) times a day, Starting Mon 6/20/2022, No Print      traZODone (DESYREL) 100 mg tablet Take 1 tablet (100 mg total) by mouth daily at bedtime as needed for sleep, Starting Mon 6/20/2022, No Print      clonazePAM (KlonoPIN) 2 mg tablet Take 0.5 tablets (1 mg total) by mouth 2 (two) times a day as needed for seizures for up to 10 days, Starting Mon 6/20/2022, Until Thu 6/30/2022 at 2359, No Print      naproxen (NAPROSYN) 500 mg tablet Take 1 tablet (500 mg total) by mouth every 12 (twelve) hours as needed for mild pain or moderate pain, Starting Fri 6/3/2022, Normal               PDMP Review         Value Time User    PDMP Reviewed  Yes 6/5/2022  1:19 AM FORREST Pendleton             ED Provider  Attending physically available and evaluated Latha Liu. I managed the patient along with the ED Attending.    Electronically Signed by           Opal Sanchez MD  04/25/24 1003

## 2024-04-30 ENCOUNTER — OFFICE VISIT (OUTPATIENT)
Dept: PODIATRY | Facility: CLINIC | Age: 49
End: 2024-04-30
Payer: COMMERCIAL

## 2024-04-30 VITALS — SYSTOLIC BLOOD PRESSURE: 116 MMHG | DIASTOLIC BLOOD PRESSURE: 72 MMHG | HEART RATE: 80 BPM

## 2024-04-30 DIAGNOSIS — S82.852A TRIMALLEOLAR FRACTURE OF ANKLE, CLOSED, LEFT, INITIAL ENCOUNTER: Primary | ICD-10-CM

## 2024-04-30 PROCEDURE — 99204 OFFICE O/P NEW MOD 45 MIN: CPT | Performed by: PODIATRIST

## 2024-04-30 RX ORDER — CEFAZOLIN SODIUM 2 G/50ML
2000 SOLUTION INTRAVENOUS ONCE
Status: CANCELLED | OUTPATIENT
Start: 2024-05-03 | End: 2024-04-30

## 2024-04-30 NOTE — PROGRESS NOTES
Assessment/Plan:         Diagnoses and all orders for this visit:    Trimalleolar fracture of ankle, closed, left, initial encounter  -     Case request operating room: OPEN REDUCTION W/ INTERNAL FIXATION (ORIF) ANKLE; Standing  -     Case request operating room: OPEN REDUCTION W/ INTERNAL FIXATION (ORIF) ANKLE    Other orders  -     Incentive spirometry; Standing  -     Insert and maintain IV line; Standing  -     Void On-Call to O.R.; Standing  -     Insert peripheral IV; Standing  -     ceFAZolin (ANCEF) 2,000 mg in dextrose 5 % 100 mL IVPB  -     Place sequential compression device; Standing      Diagnosis and options discussed with patient  Patient agreeable to the plan as stated below  Reviewed pre and post reduction films, patient has partially reducted trimalleolar ankle fracture, it is unstable    I explained this requires ORIF.   Consent was signed for [facility] ankle ORIF. PAtient will need to be prone under general anesthesia.    Surgical procedure and recovery discussed as can best be predicted.  Alternatives, risks, complications covered with the patient.    Appropriate postop medication discussed, educated patient on narcotic medication and its risks.     Patient will be sent for preoperative testing and clearance    Patient doen not need DVT prophylaxis for this procedure      Subjective:      Patient ID: Latha Liu is a 49 y.o. female.    DOI: 4/25/2024    PAtient fell, suffered ankle fracture dislocation. It was reduced in the ED and splinted. She reports no significant medical history and does not smoke.         The following portions of the patient's history were reviewed and updated as appropriate: allergies, current medications, past family history, past medical history, past social history, past surgical history, and problem list.    Review of Systems    As stated in HPI, otherwise normal    Objective:      /72 (BP Location: Right arm, Patient Position: Sitting, Cuff Size: Standard)    Pulse 80          Physical Exam  Vitals reviewed.   Constitutional:       Appearance: She is not ill-appearing or diaphoretic.   Cardiovascular:      Rate and Rhythm: Normal rate.      Pulses: Normal pulses.   Pulmonary:      Effort: Pulmonary effort is normal. No respiratory distress.   Musculoskeletal:         General: Swelling, tenderness and signs of injury present. No deformity.   Skin:     Capillary Refill: Capillary refill takes less than 2 seconds.      Findings: No erythema or rash.   Neurological:      Mental Status: She is alert and oriented to person, place, and time.      Sensory: No sensory deficit.      Gait: Gait normal.   Psychiatric:         Mood and Affect: Mood normal.       Patient has significant ankle pain RLE. No calf or forefoot pain. N/V intact toe toes. Digit ROM intact. I can palpate her DP pulse under the splint. CRT brisk.

## 2024-04-30 NOTE — DISCHARGE INSTR - AVS FIRST PAGE
Dr. Owens, DPM  Post-op surgery Instructions    Pain / Swelling  There is expected to be some discomfort, swelling and bruising of the foot. You might see some blood on the bandage. This is not a cause for alarm. However, if there is active or persistent bleeding (blood running out of the bandage while at rest) - call the office at once (or) go to a [facility] ER and ask them to page the podiatry residents.  Apply an ice bag to the top of your ankle for 30 minutes for each waking hour, for the first 72 hours. This should be discontinued when sleeping. This will also work through your cast if you have one. Ice must not leak and wet the dressings. Also, using the ice inappropriately can cause permanent nerve damage.  Your foot should be elevated as much as possible for the first 72 hours. The foot should be above heart level. If your foot is below heart level, throbbing and pain will increase.  When sleeping, elevation can be accomplished by putting a small hard suitcase between the box spring and mattress at the foot of the bed.  Walking and standing will increase pain, throbbing and bleeding.  Persistent pain despite elevation and your pain meds can many times be relieved by removing the tight brown compression layer (called the ACE wrap) that is over the white gauze dressing. If you are elevating and taking your pain meds and pain is still severe, remove this brown stretchy layer but leave the gauze intact. Wait 30 minutes. If the pain subsides, reapply the ACE so it’s not so tight. If pain doesn’t get better, call your doctor.   Dressings / Casts  Do not remove your surgical dressings - they will be changed at your doctor appointment. Do not allow surgical dressings to get wet. Sponge baths should be used until the sutures are removed.  Do not try to keep the foot dry using a garbage bag and tape - this rarely works.  If you get your dressings or cast wet - call your doctor immediately.  If your  cast or dressings feel tight - elevate your foot for 30 minutes. If this doesn’t help and you feel tingling or see toe discoloration - call your doctor or go to a [facility] ER and ask them to page the podiatry residents.   Do not put things in your cast such as powder, coat hangers to scratch, etc.. This can cause skin damage and infections.   Infection  If you have a fever at or above 100 degrees, chills, sweats, or see red streaks rising above the dressing or smell odor / see pus (creamy white drainage), call your doctor immediately or go to a [facility] ER and ask them to page the podiatry residents.  Constipation  If you have severe constipation after surgery, this can be due to the pain medication. Notify your doctor and special medication will be prescribed to deal with this.   Blood Clots  If you had surgery and are in a cast, have an external fixation device, or are non-weightbearing using crutches, a knee scooter, a wheelchair, a walker, or an iWalk device - you need to be on a blood thinner. Your doctor will prescribe one of the regimens below. If you run out of the blood thinner checked off below before you are walking normally on your foot and out of your cast - notify your doctor immediately so you can get a refill. Not doing so can lead to blood clots and serious complications including death.    Aspirin 325mg daily    Numbness  It is normal for your foot to be numb until about dinner time. If you’ve had a popliteal block procedure, you might be numb until the following day. When you start to feel pins and needles in the foot - this means the block is wearing off. That is the appropriate time to take your pain medication.   Pain Medication  Do not supplement your pain medication with over the counter drugs, old leftover pain medications, or extra Tylenol. You must discuss any additional medications with your doctor prior to taking them for pain.   Driving  No driving is allowed  without discussion with the doctor  Ambulation  Nonweightbearing to surgical foot  If given a flat, stiff shoe / darco wedge shoe, Do not walk at all without it.  Use a device (cane, walker, crutches) to take some weight off of the foot when walking  If instructed not to put weight on the surgical foot, use the following:  Knee scooter     Putting weight on the foot will lead to complications.

## 2024-05-01 ENCOUNTER — APPOINTMENT (OUTPATIENT)
Dept: LAB | Facility: HOSPITAL | Age: 49
End: 2024-05-01
Payer: COMMERCIAL

## 2024-05-01 DIAGNOSIS — Z01.818 PRE-OP EVALUATION: Primary | ICD-10-CM

## 2024-05-01 DIAGNOSIS — Z01.818 PRE-OP EVALUATION: ICD-10-CM

## 2024-05-01 LAB
ANION GAP SERPL CALCULATED.3IONS-SCNC: 10 MMOL/L (ref 4–13)
BASOPHILS # BLD AUTO: 0.01 THOUSANDS/ÂΜL (ref 0–0.1)
BASOPHILS NFR BLD AUTO: 0 % (ref 0–1)
BUN SERPL-MCNC: 12 MG/DL (ref 5–25)
CALCIUM SERPL-MCNC: 8.6 MG/DL (ref 8.4–10.2)
CHLORIDE SERPL-SCNC: 103 MMOL/L (ref 96–108)
CO2 SERPL-SCNC: 25 MMOL/L (ref 21–32)
CREAT SERPL-MCNC: 0.96 MG/DL (ref 0.6–1.3)
EOSINOPHIL # BLD AUTO: 0.2 THOUSAND/ÂΜL (ref 0–0.61)
EOSINOPHIL NFR BLD AUTO: 2 % (ref 0–6)
ERYTHROCYTE [DISTWIDTH] IN BLOOD BY AUTOMATED COUNT: 13.9 % (ref 11.6–15.1)
GFR SERPL CREATININE-BSD FRML MDRD: 69 ML/MIN/1.73SQ M
GLUCOSE P FAST SERPL-MCNC: 118 MG/DL (ref 65–99)
HCT VFR BLD AUTO: 33.1 % (ref 34.8–46.1)
HGB BLD-MCNC: 10.6 G/DL (ref 11.5–15.4)
IMM GRANULOCYTES # BLD AUTO: 0.03 THOUSAND/UL (ref 0–0.2)
IMM GRANULOCYTES NFR BLD AUTO: 0 % (ref 0–2)
LYMPHOCYTES # BLD AUTO: 1.54 THOUSANDS/ÂΜL (ref 0.6–4.47)
LYMPHOCYTES NFR BLD AUTO: 16 % (ref 14–44)
MCH RBC QN AUTO: 31.4 PG (ref 26.8–34.3)
MCHC RBC AUTO-ENTMCNC: 32 G/DL (ref 31.4–37.4)
MCV RBC AUTO: 98 FL (ref 82–98)
MONOCYTES # BLD AUTO: 0.57 THOUSAND/ÂΜL (ref 0.17–1.22)
MONOCYTES NFR BLD AUTO: 6 % (ref 4–12)
NEUTROPHILS # BLD AUTO: 7.51 THOUSANDS/ÂΜL (ref 1.85–7.62)
NEUTS SEG NFR BLD AUTO: 76 % (ref 43–75)
NRBC BLD AUTO-RTO: 0 /100 WBCS
PLATELET # BLD AUTO: 256 THOUSANDS/UL (ref 149–390)
PMV BLD AUTO: 10.4 FL (ref 8.9–12.7)
POTASSIUM SERPL-SCNC: 4 MMOL/L (ref 3.5–5.3)
RBC # BLD AUTO: 3.38 MILLION/UL (ref 3.81–5.12)
SODIUM SERPL-SCNC: 138 MMOL/L (ref 135–147)
WBC # BLD AUTO: 9.86 THOUSAND/UL (ref 4.31–10.16)

## 2024-05-01 PROCEDURE — 36415 COLL VENOUS BLD VENIPUNCTURE: CPT

## 2024-05-01 PROCEDURE — 80048 BASIC METABOLIC PNL TOTAL CA: CPT

## 2024-05-01 PROCEDURE — 85025 COMPLETE CBC W/AUTO DIFF WBC: CPT

## 2024-05-01 NOTE — PRE-PROCEDURE INSTRUCTIONS
Pre-Surgery Instructions:   Medication Instructions    acetaminophen (TYLENOL) 650 mg CR tablet Uses PRN- OK to take day of surgery    Cholecalciferol (Vitamin D3) 50 MCG (2000 UT) TABS Hold day of surgery.    clonazePAM (KlonoPIN) 2 mg tablet Uses PRN- OK to take day of surgery    DULoxetine (CYMBALTA) 20 mg capsule Take night before surgery    estradiol (ESTRACE) 1 mg tablet Take night before surgery    ibuprofen (MOTRIN) 600 mg tablet Stop taking 7 days prior to surgery.    oxyCODONE (Roxicodone) 5 immediate release tablet Uses PRN- DO NOT take day of surgery    QUEtiapine (SEROquel) 300 mg tablet Take night before surgery    traZODone (DESYREL) 100 mg tablet Take night before surgery    Medication instructions for day surgery reviewed. Please use only a sip of water to take your instructed medications. Avoid all over the counter vitamins, supplements and NSAIDS for one week prior to surgery per anesthesia guidelines. Tylenol is ok to take as needed.     You will receive a call one business day prior to surgery with an arrival time and hospital directions. If your surgery is scheduled on a Monday, the hospital will be calling you on the Friday prior to your surgery. If you have not heard from anyone by 8pm, please call the hospital supervisor through the hospital  at [phone]. ([place] [phone] or [place] [phone]).    Do not eat or drink anything after midnight the night before your surgery, including candy, mints, lifesavers, or chewing gum. Do not drink alcohol 24hrs before your surgery. Try not to smoke at least 24hrs before your surgery.       Follow the pre surgery showering instructions as listed in the “My Surgical Experience Booklet” or otherwise provided by your surgeon's office. Do not use a blade to shave the surgical area 1 week before surgery. It is okay to use a clean electric clippers up to 24 hours before surgery. Do not apply any lotions, creams, including makeup, cologne,  deodorant, or perfumes after showering on the day of your surgery. Do not use dry shampoo, hair spray, hair gel, or any type of hair products.     No contact lenses, eye make-up, or artificial eyelashes. Remove nail polish, including gel polish, and any artificial, gel, or acrylic nails if possible. Remove all jewelry including rings and body piercing jewelry.     Wear causal clothing that is easy to take on and off. Consider your type of surgery.    Keep any valuables, jewelry, piercings at home. Please bring any specially ordered equipment (sling, braces) if indicated.    Arrange for a responsible person to drive you to and from the hospital on the day of your surgery. Please confirm the visitor policy for the day of your procedure when you receive your phone call with an arrival time.     Call the surgeon's office with any new illnesses, exposures, or additional questions prior to surgery.    Please reference your “My Surgical Experience Booklet” for additional information to prepare for your upcoming surgery.

## 2024-05-02 ENCOUNTER — TELEPHONE (OUTPATIENT)
Dept: OBGYN CLINIC | Facility: HOSPITAL | Age: 49
End: 2024-05-02

## 2024-05-02 ENCOUNTER — ANESTHESIA EVENT (OUTPATIENT)
Dept: PERIOP | Facility: HOSPITAL | Age: 49
End: 2024-05-02
Payer: COMMERCIAL

## 2024-05-03 ENCOUNTER — ANESTHESIA (OUTPATIENT)
Dept: PERIOP | Facility: HOSPITAL | Age: 49
End: 2024-05-03
Payer: COMMERCIAL

## 2024-05-03 ENCOUNTER — HOSPITAL ENCOUNTER (OUTPATIENT)
Facility: HOSPITAL | Age: 49
Setting detail: OUTPATIENT SURGERY
Discharge: HOME | End: 2024-05-03
Attending: PODIATRIST | Admitting: PODIATRIST
Payer: COMMERCIAL

## 2024-05-03 ENCOUNTER — APPOINTMENT (OUTPATIENT)
Dept: RADIOLOGY | Facility: HOSPITAL | Age: 49
End: 2024-05-03
Payer: COMMERCIAL

## 2024-05-03 VITALS
WEIGHT: 200 LBS | RESPIRATION RATE: 18 BRPM | OXYGEN SATURATION: 95 % | HEIGHT: 64 IN | HEART RATE: 72 BPM | BODY MASS INDEX: 34.15 KG/M2 | SYSTOLIC BLOOD PRESSURE: 135 MMHG | DIASTOLIC BLOOD PRESSURE: 78 MMHG | TEMPERATURE: 98.7 F

## 2024-05-03 DIAGNOSIS — Z98.890 POST-OPERATIVE STATE: Primary | ICD-10-CM

## 2024-05-03 PROCEDURE — C1713 ANCHOR/SCREW BN/BN,TIS/BN: HCPCS | Performed by: PODIATRIST

## 2024-05-03 PROCEDURE — 77071 MNL APPL STRS JT RADIOGRAPHY: CPT | Performed by: PODIATRIST

## 2024-05-03 PROCEDURE — 27823 TREATMENT OF ANKLE FRACTURE: CPT | Performed by: PODIATRIST

## 2024-05-03 PROCEDURE — 99024 POSTOP FOLLOW-UP VISIT: CPT | Performed by: PODIATRIST

## 2024-05-03 PROCEDURE — 73610 X-RAY EXAM OF ANKLE: CPT

## 2024-05-03 PROCEDURE — 73600 X-RAY EXAM OF ANKLE: CPT

## 2024-05-03 PROCEDURE — 27829 TREAT LOWER LEG JOINT: CPT | Performed by: PODIATRIST

## 2024-05-03 DEVICE — IMPLANTABLE DEVICE: Type: IMPLANTABLE DEVICE | Site: ANKLE | Status: FUNCTIONAL

## 2024-05-03 DEVICE — SCREW CORT 3.5 X 12MM LOPRFL FLLY THRD: Type: IMPLANTABLE DEVICE | Site: ANKLE | Status: FUNCTIONAL

## 2024-05-03 DEVICE — K-LESS T-ROPE W/DRV, SYN REPR, TI
Type: IMPLANTABLE DEVICE | Site: ANKLE | Status: FUNCTIONAL
Brand: ARTHREX®

## 2024-05-03 DEVICE — SCREW VAL 3 X 16MM KREULOCK: Type: IMPLANTABLE DEVICE | Site: ANKLE | Status: FUNCTIONAL

## 2024-05-03 DEVICE — SCREW VAL 3 X 12MM KREULOCK: Type: IMPLANTABLE DEVICE | Site: ANKLE | Status: FUNCTIONAL

## 2024-05-03 DEVICE — ANCHOR SUT TAK-BB THRD: Type: IMPLANTABLE DEVICE | Site: ANKLE | Status: FUNCTIONAL

## 2024-05-03 DEVICE — IMPLANTABLE DEVICE: Type: IMPLANTABLE DEVICE | Site: FOOT | Status: FUNCTIONAL

## 2024-05-03 DEVICE — SCREW CORT 3.5 X 14MM LOPRFL FLLY THRD: Type: IMPLANTABLE DEVICE | Site: ANKLE | Status: FUNCTIONAL

## 2024-05-03 RX ORDER — SODIUM CHLORIDE, SODIUM LACTATE, POTASSIUM CHLORIDE, CALCIUM CHLORIDE 600; 310; 30; 20 MG/100ML; MG/100ML; MG/100ML; MG/100ML
100 INJECTION, SOLUTION INTRAVENOUS CONTINUOUS
Status: DISCONTINUED | OUTPATIENT
Start: 2024-05-03 | End: 2024-05-03 | Stop reason: HOSPADM

## 2024-05-03 RX ORDER — FENTANYL CITRATE/PF 50 MCG/ML
50 SYRINGE (ML) INJECTION
Status: DISCONTINUED | OUTPATIENT
Start: 2024-05-03 | End: 2024-05-03 | Stop reason: HOSPADM

## 2024-05-03 RX ORDER — BUPIVACAINE HYDROCHLORIDE 5 MG/ML
INJECTION, SOLUTION EPIDURAL; INTRACAUDAL AS NEEDED
Status: DISCONTINUED | OUTPATIENT
Start: 2024-05-03 | End: 2024-05-03

## 2024-05-03 RX ORDER — MIDAZOLAM HYDROCHLORIDE 2 MG/2ML
INJECTION, SOLUTION INTRAMUSCULAR; INTRAVENOUS AS NEEDED
Status: DISCONTINUED | OUTPATIENT
Start: 2024-05-03 | End: 2024-05-03

## 2024-05-03 RX ORDER — OXYCODONE HYDROCHLORIDE AND ACETAMINOPHEN 5; 325 MG/1; MG/1
1 TABLET ORAL EVERY 4 HOURS PRN
Qty: 30 TABLET | Refills: 0 | Status: SHIPPED | OUTPATIENT
Start: 2024-05-03 | End: 2024-05-13

## 2024-05-03 RX ORDER — ACETAMINOPHEN 325 MG/1
650 TABLET ORAL EVERY 4 HOURS PRN
Status: CANCELLED | OUTPATIENT
Start: 2024-05-03

## 2024-05-03 RX ORDER — ONDANSETRON 2 MG/ML
4 INJECTION INTRAMUSCULAR; INTRAVENOUS ONCE AS NEEDED
Status: DISCONTINUED | OUTPATIENT
Start: 2024-05-03 | End: 2024-05-03 | Stop reason: HOSPADM

## 2024-05-03 RX ORDER — ASPIRIN 325 MG
325 TABLET ORAL DAILY
Qty: 30 TABLET | Refills: 0 | Status: SHIPPED | OUTPATIENT
Start: 2024-05-03

## 2024-05-03 RX ORDER — GLYCOPYRROLATE 0.2 MG/ML
INJECTION INTRAMUSCULAR; INTRAVENOUS AS NEEDED
Status: DISCONTINUED | OUTPATIENT
Start: 2024-05-03 | End: 2024-05-03

## 2024-05-03 RX ORDER — FENTANYL CITRATE 50 UG/ML
INJECTION, SOLUTION INTRAMUSCULAR; INTRAVENOUS AS NEEDED
Status: DISCONTINUED | OUTPATIENT
Start: 2024-05-03 | End: 2024-05-03

## 2024-05-03 RX ORDER — SODIUM CHLORIDE, SODIUM LACTATE, POTASSIUM CHLORIDE, CALCIUM CHLORIDE 600; 310; 30; 20 MG/100ML; MG/100ML; MG/100ML; MG/100ML
INJECTION, SOLUTION INTRAVENOUS CONTINUOUS PRN
Status: DISCONTINUED | OUTPATIENT
Start: 2024-05-03 | End: 2024-05-03

## 2024-05-03 RX ORDER — DEXAMETHASONE SODIUM PHOSPHATE 10 MG/ML
INJECTION, SOLUTION INTRAMUSCULAR; INTRAVENOUS AS NEEDED
Status: DISCONTINUED | OUTPATIENT
Start: 2024-05-03 | End: 2024-05-03

## 2024-05-03 RX ORDER — ROCURONIUM BROMIDE 10 MG/ML
INJECTION, SOLUTION INTRAVENOUS AS NEEDED
Status: DISCONTINUED | OUTPATIENT
Start: 2024-05-03 | End: 2024-05-03

## 2024-05-03 RX ORDER — PROPOFOL 10 MG/ML
INJECTION, EMULSION INTRAVENOUS AS NEEDED
Status: DISCONTINUED | OUTPATIENT
Start: 2024-05-03 | End: 2024-05-03

## 2024-05-03 RX ORDER — ONDANSETRON 2 MG/ML
INJECTION INTRAMUSCULAR; INTRAVENOUS AS NEEDED
Status: DISCONTINUED | OUTPATIENT
Start: 2024-05-03 | End: 2024-05-03

## 2024-05-03 RX ORDER — MAGNESIUM HYDROXIDE 1200 MG/15ML
LIQUID ORAL AS NEEDED
Status: DISCONTINUED | OUTPATIENT
Start: 2024-05-03 | End: 2024-05-03 | Stop reason: HOSPADM

## 2024-05-03 RX ORDER — DIPHENHYDRAMINE HYDROCHLORIDE 50 MG/ML
12.5 INJECTION INTRAMUSCULAR; INTRAVENOUS ONCE AS NEEDED
Status: DISCONTINUED | OUTPATIENT
Start: 2024-05-03 | End: 2024-05-03 | Stop reason: HOSPADM

## 2024-05-03 RX ORDER — OXYCODONE HYDROCHLORIDE AND ACETAMINOPHEN 5; 325 MG/1; MG/1
1 TABLET ORAL EVERY 4 HOURS PRN
Status: CANCELLED | OUTPATIENT
Start: 2024-05-03

## 2024-05-03 RX ORDER — NEOSTIGMINE METHYLSULFATE 1 MG/ML
INJECTION INTRAVENOUS AS NEEDED
Status: DISCONTINUED | OUTPATIENT
Start: 2024-05-03 | End: 2024-05-03

## 2024-05-03 RX ORDER — CEFAZOLIN SODIUM 2 G/50ML
2000 SOLUTION INTRAVENOUS ONCE
Status: COMPLETED | OUTPATIENT
Start: 2024-05-03 | End: 2024-05-03

## 2024-05-03 RX ADMIN — FENTANYL CITRATE 50 MCG: 50 INJECTION, SOLUTION INTRAMUSCULAR; INTRAVENOUS at 13:06

## 2024-05-03 RX ADMIN — SODIUM CHLORIDE, SODIUM LACTATE, POTASSIUM CHLORIDE, AND CALCIUM CHLORIDE: .6; .31; .03; .02 INJECTION, SOLUTION INTRAVENOUS at 11:35

## 2024-05-03 RX ADMIN — CEFAZOLIN SODIUM 2000 MG: 2 SOLUTION INTRAVENOUS at 12:20

## 2024-05-03 RX ADMIN — GLYCOPYRROLATE 0.4 MCG: 0.2 INJECTION, SOLUTION INTRAMUSCULAR; INTRAVENOUS at 14:49

## 2024-05-03 RX ADMIN — NEOSTIGMINE METHYLSULFATE 3 MG: 1 INJECTION INTRAVENOUS at 14:49

## 2024-05-03 RX ADMIN — FENTANYL CITRATE 50 MCG: 50 INJECTION, SOLUTION INTRAMUSCULAR; INTRAVENOUS at 12:22

## 2024-05-03 RX ADMIN — MIDAZOLAM 2 MG: 1 INJECTION INTRAMUSCULAR; INTRAVENOUS at 11:55

## 2024-05-03 RX ADMIN — FENTANYL CITRATE 50 MCG: 50 INJECTION, SOLUTION INTRAMUSCULAR; INTRAVENOUS at 14:23

## 2024-05-03 RX ADMIN — FENTANYL CITRATE 25 MCG: 50 INJECTION, SOLUTION INTRAMUSCULAR; INTRAVENOUS at 14:50

## 2024-05-03 RX ADMIN — BUPIVACAINE HYDROCHLORIDE 15 ML: 5 INJECTION, SOLUTION EPIDURAL; INTRACAUDAL; PERINEURAL at 12:08

## 2024-05-03 RX ADMIN — ROCURONIUM BROMIDE 50 MG: 10 INJECTION INTRAVENOUS at 12:22

## 2024-05-03 RX ADMIN — ONDANSETRON 4 MG: 2 INJECTION INTRAMUSCULAR; INTRAVENOUS at 12:25

## 2024-05-03 RX ADMIN — DEXAMETHASONE SODIUM PHOSPHATE 10 MG: 10 INJECTION, SOLUTION INTRAMUSCULAR; INTRAVENOUS at 12:25

## 2024-05-03 RX ADMIN — PROPOFOL 150 MG: 10 INJECTION, EMULSION INTRAVENOUS at 12:22

## 2024-05-03 RX ADMIN — SODIUM CHLORIDE, SODIUM LACTATE, POTASSIUM CHLORIDE, AND CALCIUM CHLORIDE: .6; .31; .03; .02 INJECTION, SOLUTION INTRAVENOUS at 13:02

## 2024-05-03 RX ADMIN — FENTANYL CITRATE 25 MCG: 50 INJECTION, SOLUTION INTRAMUSCULAR; INTRAVENOUS at 15:06

## 2024-05-03 RX ADMIN — BUPIVACAINE HYDROCHLORIDE 15 ML: 5 INJECTION, SOLUTION EPIDURAL; INTRACAUDAL; PERINEURAL at 12:12

## 2024-05-03 NOTE — OP NOTE
OPERATIVE REPORT - Podiatry  PATIENT NAME: Latha Liu    :  1975  MRN: [id number]  Pt Location:  OR ROOM 12    SURGERY DATE: 5/3/2024    Surgeons and Role:     * Nghia Owens, DPM - Primary     * Aubrey Norris, DPM - Assisting     * Yovany Guillen, TEEM - Assisting    Pre-op Diagnosis:  Trimalleolar fracture of ankle, closed, left, initial encounter [S82.852A]    Post-Op Diagnosis Codes:     * Trimalleolar fracture of ankle, closed, left, initial encounter [S82.852A]    Procedure(s) (LRB):  OPEN REDUCTION W/ INTERNAL FIXATION (ORIF) ANKLE (Right)  ORIF fibula  ORIF posterior malleolus  ORIF medial malleolus  Syndesmotic repair  Application of posterior splint    Specimen(s):  * No specimens in log *    Estimated Blood Loss:   Minimal    Drains:  * No LDAs found *    Anesthesia Type:   General w/ Popliteal Block     Hemostasis:  Pneumatic thigh tourniquet set at 300 mmHg for 120 mins  Direct compression, electrocautery    Materials:  Implant Name Type Inv. Item Serial No.  Lot No. LRB No. Used Action   PLATE POSTERO LAT DIST TIB TI  RT 4H - [id number]  PLATE POSTERO LAT DIST TIB TI  RT 4H  [employer]  Right 1 Implanted   ANCHOR SUT DAWSON-BB THRD - [id number]  ANCHOR SUT DAWSON-BB THRD  [employer]  Right 2 Implanted   KIT REPAIR KNOTLESS TIGHTROPE W/DRV SYNDESMOSIS TI - [id number]  KIT REPAIR KNOTLESS TIGHTROPE W/DRV SYNDESMOSIS TI  [employer] [id number] Right 1 Implanted   SCREW LOLI 4 X 32MM LOPRFL SHRT THRD - [id number]  SCREW LOLI 4 X 32MM LOPRFL SHRT THRD  [employer]  Right 1 Implanted   PLATE FIBULA DSTL LCK 6HL RT - [id number]  PLATE FIBULA DSTL LCK 6HL RT  [employer]  Right 1 Implanted   SCREW LORY 3 X 20MM KREULOCK - [id number]  SCREW LORY 3 X 20MM KREULOCK  [employer]  Right 1 Implanted   SCREW LORY 3 X 16MM KREULOCK - [id number]  SCREW LORY 3 X 16MM KREULOCK  [employer]  Right 1 Implanted   SCREW LORY 3 X 10MM KREULOCK - [id number]  SCREW LORY 3 X 10MM [facility]TY   Right 1 Implanted   SCREW LORY 3 X 12MM KREULOCK - [id number]  SCREW LORY 3 X 12MM KREULOCK  [employer]  Right 2 Implanted   SCREW LORY 3.0 X 40 MM KREULOCK    APOTEX BETTY  Right 1 Implanted   SCREW LORY 3.0 X 42 MM KREULOCK    APOTEX BETTY  Right 1 Implanted   SCREW LCK 3.5 X 20MM LOPRFL FLLY THRD - [id number]  SCREW LCK 3.5 X 20MM LOPRFL FLLY THRD  [employer]  Right 1 Implanted   SCREW LCK 3.5 X 22MM LOPRFL FLLY THRD - [id number]  SCREW LCK 3.5 X 22MM LOPRFL FLLY THRD  [employer]  Right 1 Implanted   SCREW LCK 3.5 X 24MM LOPRFL FLLY THRD - [id number]  SCREW LCK 3.5 X 24MM LOPRFL FLLY THRD  [employer]  Right 1 Implanted   SCREW DEMETRIA 3.5 X 12MM LOPRFL FLLY THRD - [id number]  SCREW DEMETRIA 3.5 X 12MM LOPRFL FLLY THRD  [employer]  Right 1 Implanted   SCREW DEMETRIA 3.5 X 14MM LOPRFL FLLY THRD - [id number]  SCREW DEMETRIA 3.5 X 14MM LOPRFL FLLY THRD  [employer]  Right 2 Implanted     2-0 and 3-0 vicryl  staples    Injectables:  None    Operative Findings:  Displaced comminuted fracture of the distal fibula  Displaced fracture of the posterior malleolus with proximal displacement of the main fracture fragment  Displaced transverse fracture of the medial malleolus  Anatomic reduction and stabilization of fibular, posterior malleoli are and medial malleolar fractures with adequate quit ankle joint alignment  Repair of syndesmotic ligament    Complications:   None    Procedure and Technique:     Under mild sedation, the patient was brought into the operating room and placed on the operating room table in the prone position. IV sedation was achieved by anesthesia team and a universal timeout was performed where all parties are in agreement of correct patient, correct procedure and correct site. A pneumatic tourniquet was then placed over the patient's right lower extremity with ample padding.The foot was then prepped and draped in the usual aseptic manner. An esmarch bandage was used to exsangunate the foot and the pneumatic  tourniquet was then inflated to 300 mmHg.     Fibular ORIF:  Attention was then directed to the posterior ankle of the affected extremity.  A surgical marker was utilized to make approximately a 15 cm long incision midway between the posterior border of the fibula and the lateral border of the Achilles tendon.  Blunt dissection was carried deep utilizing Metzenbaum scissors.  Hemostasis was achieved with electrocautery throughout dissection.  The peroneal sheath was identified and was split utilizing Metzenbaum scissors.  The peroneal tendons were carefully retracted laterally and an interval was created between the peroneal tendons and the flexor hallucis longus muscle bellies.  The distal fibula was identified, along with the communited and oblique fibular fracture.  A key elevator was utilized to strip the periosteum both medially and laterally off of the fibula for better fracture visualization.  A large loose fragment was noted within fracture site. A combination of curette, rongeur, dental pick was utilized to remove all debris and hematoma from the fracture site.  Utilizing a reduction clamps, the distal fragment of the fibula was extended distally out to length until anatomic alignment was achieved in comparison to the proximal fragment.   Proper alignment was assessed under fluoroscopy and was found to be satisfactory.  A fibular plate was then placed laterally to the fibula and maintained with clamps.  Utilizing proper AO technique, plate holes were drilled, measured, and appropriate size screws were placed into the plate holes as described above and implants. The construct was stable. Final fluoroscopic images were then taken in appropriate planes and adequate plate position was noted, as well as the fibula and with anatomic alignment.     Posterior malleolus ORIF:  The dissection was then carried medially from the posterior tibia until the posterior tibial fracture was appreciated.  A surgical blade was  utilized to incise the periosteum, which was stripped medially and laterally off of the posterior tibia utilizing a key elevator.  The posterior malleolar fragment was visualized.  Utilizing a combination of curette and rongeur, all debris and hematoma were cleared from the fracture site.  A 0.062 K-wire was inserted into the posterior fragment, which was used to manipulate the fragment into near anatomic alignment.  The K-wire was then inserted into the anterior fragment for temporary stabilization.  Proper positioning of the posterior malleolar fragment was assessed under fluoroscopy.  A posterior-lateral distal tibia plate was fit to the posterior malleolus, then while maintained with olive wires was applied tilizing standard AO technique, the plate holes were drilled, measured, and had the appropriate length screws inserted.  Final fluoroscopic images were taken and assessed, both fracture sites appear to be in near anatomic alignment with proper plate placement, fibula appeared to be adequately brought out to length.      Medial malleolus ORIF:  Incision was then made approximately 5 cm in length linear extending from superior to inferior overlying the midline of the medial aspect of the distal tibia, extending from just distal to the medial malleolus and proximally from there.  The incision was deepened down in a layered fashion.  Care was taken to retract all vital neurovascular structures.  All bleeders were cauterized or ligated as necessary.  Upon visualization of the medial aspect of the patient's ankle, the fracture site was noted as transverse across the distal medial malleolus.  Fracture was reduced and realigned with a point-to-point.  2 K wires were placed from distal to proximal at a slight oblique angle from the distal medial malleolus and into the mid medullary canal of the distal shaft of the tibia.  Intraoperative fluoroscopy was used in multiple planes and noted adequate position of the K  "wires.  Next, 4.0 cancellous screws were inserted over K wires.  Adequate alignment and compression of the fracture site was noted both clinically and with fluoroscopy.  The surgical wound was then copiously irrigated with NSS and closed in layered fashion using 2-0 then 3-0 Vicryl and staples.    Syndesmotic repair:  External rotation as well as stress to fibula was applied under image and reduction appeared with mild instability. Arthrex tightrope was placed for syndesmotic repair. Final intra-op fluoroscopy was then taken and adequate alignment of the ankle joint with compression of both medial and lateral fracture sites and alignment of fracture fragments was noted.  The surgical incision was copiously irrigated with sterile saline and closed in layered fashion with 2-0 then 3-0 Vicryl and staples.    Application of posterior splint:  The incision sites were cleaned and dried.  Dressings were then placed with Xeroform, gauze, ABD pads and Kerlix.  A posterior splint dressing was then placed with ample padding.    The tourniquet was deflated at approximately 120 min and normal hyperemic response was noted to all digits. The patient tolerated the procedure and anesthesia well without immediate complications and transferred to PACU with vital signs stable.     Dr. Owens was present during the entire procedure and participated in all key aspects.    SIGNATURE: Aubrey Norris DPM  DATE: May 3, 2024  TIME: 3:08 PM      Portions of the record may have been created with voice recognition software. Occasional wrong word or \"sound a like\" substitutions may have occurred due to the inherent limitations of voice recognition software. Read the chart carefully and recognize, using context, where substitutions have occurred.              "

## 2024-05-03 NOTE — ANESTHESIA PROCEDURE NOTES
Peripheral Block    Patient location during procedure: holding area  Start time: 5/3/2024 12:08 PM  Reason for block: at surgeon's request and post-op pain management  Staffing  Performed by: Rema Infante MD  Authorized by: Rema Infante MD    Preanesthetic Checklist  Completed: patient identified, IV checked, site marked, risks and benefits discussed, surgical consent, monitors and equipment checked, pre-op evaluation and timeout performed  Peripheral Block  Patient position: supine  Prep: ChloraPrep  Patient monitoring: frequent blood pressure checks, continuous pulse oximetry and heart rate  Block type: Popliteal  Laterality: right  Injection technique: single-shot  Procedures: ultrasound guided, Ultrasound guidance required for the procedure to increase accuracy and safety of medication placement and decrease risk of complications.  Ultrasound permanent image saved    Needle  Needle type: Stimuplex   Needle gauge: 20 G  Needle length: 4 in  Needle localization: anatomical landmarks and ultrasound guidance  Needle insertion depth: 3 cm  Assessment  Injection assessment: incremental injection, frequent aspiration, injected with ease, negative aspiration, negative for heart rate change, no paresthesia on injection, no symptoms of intraneural/intravenous injection and needle tip visualized at all times  Paresthesia pain: none  Post-procedure:  site cleaned  patient tolerated the procedure well with no immediate complications

## 2024-05-03 NOTE — ANESTHESIA POSTPROCEDURE EVALUATION
Post-Op Assessment Note    CV Status:  Stable  Pain Score: 0    Pain management: adequate       Mental Status:  Alert and awake   Hydration Status:  Euvolemic   PONV Controlled:  Controlled   Airway Patency:  Patent     Post Op Vitals Reviewed: Yes    No anethesia notable event occurred.    Staff: Anesthesiologist, CRNA   Comments: Report given to recovering RN, VSS, Pt states she is comfortable          /71 (05/03/24 1510)    Temp 97.9 °F (36.6 °C) (05/03/24 1510)    Pulse 90 (05/03/24 1510)   Resp 14 (05/03/24 1510)    SpO2 98 % (05/03/24 1510)

## 2024-05-03 NOTE — PROGRESS NOTES
Right Popliteal and Adductor Canal Blocks:    Time out preformed. Patient and consent verified. Laterality verified. Patient sedated on standard ASA monitors. Patient prepped in sterile fashion. Needle advanced under ultrasound guidance. 25 mL of a mixture of 10 mL Exparel and 15 mL of 0.5% Bupivacaine injected after negative aspiration in 5 cc increments for popliteal block. Image stored. No complications apparent. VSS.    Patient prepped in sterile fashion for adductor canal block. Needle advanced under ultrasound guidance. 25 mL of a mixture of 10 mL Exparel and 15 mL of 0.5% Bupivacaine injected after negative aspiration in 5 cc increments for adductor canal block. Image stored. No complications apparent. VSS.

## 2024-05-03 NOTE — H&P
H&P completed on 5/2/24 by Manny Betancourt DO of [facility] (See Care Everywhere). No change since H&P completed.

## 2024-05-03 NOTE — ANESTHESIA PREPROCEDURE EVALUATION
Procedure:  OPEN REDUCTION W/ INTERNAL FIXATION (ORIF) ANKLE (Right: Foot)    Relevant Problems   /RENAL   (+) Acute kidney injury (HCC)      NEURO/PSYCH   (+) Severe episode of recurrent major depressive disorder, with psychotic features (HCC)      PULMONARY   (+) Mild intermittent asthma without complication      Plant for Right popliteal and adductor canal block followed by general anesthesia with LMA. Risks and benefits explained. Patient and surgeon in agreement with the above plan.       Physical Exam    Airway    Mallampati score: II  TM Distance: >3 FB  Neck ROM: full     Dental   No notable dental hx     Cardiovascular      Pulmonary      Other Findings  post-pubertal.      Anesthesia Plan  ASA Score- 2     Anesthesia Type- general with ASA Monitors.         Additional Monitors:     Airway Plan: LMA.           Plan Factors-Exercise tolerance (METS): >4 METS.    Chart reviewed.    Patient summary reviewed.    Patient is not a current smoker.  Patient did not smoke on day of surgery.            Induction- intravenous.    Postoperative Plan- Plan for postoperative opioid use.     Informed Consent- Anesthetic plan and risks discussed with patient.  I personally reviewed this patient with the CRNA. Discussed and agreed on the Anesthesia Plan with the CRNA..

## 2024-05-03 NOTE — ANESTHESIA PROCEDURE NOTES
Peripheral Block    Patient location during procedure: holding area  Start time: 5/3/2024 12:12 PM  Reason for block: at surgeon's request and post-op pain management  Staffing  Performed by: Rema Infante MD  Authorized by: Rema Infante MD    Preanesthetic Checklist  Completed: patient identified, IV checked, site marked, risks and benefits discussed, surgical consent, monitors and equipment checked, pre-op evaluation and timeout performed  Peripheral Block  Patient position: supine  Prep: ChloraPrep  Patient monitoring: frequent blood pressure checks, continuous pulse oximetry and heart rate  Block type: Adductor Canal  Laterality: right  Injection technique: single-shot  Procedures: ultrasound guided, Ultrasound guidance required for the procedure to increase accuracy and safety of medication placement and decrease risk of complications.  Ultrasound permanent image saved    Needle  Needle type: Stimuplex   Needle gauge: 20 G  Needle length: 4 in  Needle localization: anatomical landmarks and ultrasound guidance  Needle insertion depth: 4 cm  Assessment  Injection assessment: incremental injection, frequent aspiration, injected with ease, negative aspiration, negative for heart rate change, no paresthesia on injection, no symptoms of intraneural/intravenous injection and needle tip visualized at all times  Paresthesia pain: none  Post-procedure:  site cleaned  patient tolerated the procedure well with no immediate complications

## 2024-05-03 NOTE — DISCHARGE SUMMARY
Discharge Summary Outpatient Procedure Podiatry -   Latha Liu 49 y.o. female MRN: [id number]  Unit/Bed#: OR [place] Encounter: [id number]    Admission Date: 5/3/2024     Admitting Diagnosis: Trimalleolar fracture of ankle, closed, left, initial encounter [S82.852A]    Discharge Diagnosis: same    Procedures Performed: OPEN REDUCTION W/ INTERNAL FIXATION (ORIF) ANKLE: 77995 (CPT®)    Complications: none    Condition at Discharge: stable    Discharge instructions/Information to patient and family:   See after visit summary for information provided to patient and family.      Provisions for Follow-Up Care/Important appointments:  See after visit summary for information related to follow-up care and any pertinent home health orders.      Discharge Medications:  See after visit summary for reconciled discharge medications provided to patient and family.

## 2024-05-03 NOTE — NURSING NOTE
Incentive spirometer given to pt. Pt states she has used incentive spirometer's and verbalizes an understanding of correct usage and needs no further instruction.

## 2024-05-07 ENCOUNTER — TELEPHONE (OUTPATIENT)
Age: 49
End: 2024-05-07

## 2024-05-07 ENCOUNTER — NURSE TRIAGE (OUTPATIENT)
Dept: OTHER | Facility: OTHER | Age: 49
End: 2024-05-07

## 2024-05-07 NOTE — TELEPHONE ENCOUNTER
As per on call Dr Vidal  if pt is  having discomfort and swelling I would suggest they call the office tomorrow and get in with Dr. Owens. Please make sure she states she is a post op patient so she will be able to get seen. Her nerve has now worn off. Bruising is normal given her injury and surgery.They can check the odor and drainage in the morning. And call back tonight with , any fever or chills.    I would also recommend strict elevation on multiple pillows and remaining non weight bearing.

## 2024-05-07 NOTE — TELEPHONE ENCOUNTER
As per Dr Vidal In between Percocet dosages pt can take ibuprofen. Pt daughter made aware. And verbalized understanding.

## 2024-05-07 NOTE — TELEPHONE ENCOUNTER
TT to on call DR Ambrose: pt family  is calling with complaint of foot pain and swelling. they removed the ace bandage and  her rt foot has pain with yellow, brown foul odor discharge and more swollen than it was two days ago. 10/10 with Percocet 10 minutes ago. When she took the last Percocet it did not help 10 am. Pt daughter is  reporting that the top of the foot is purplish and she can move the toes but not that much due to swelling and pain. she is only able to visualize a portion of the foot due to it is covered by bandage.

## 2024-05-07 NOTE — TELEPHONE ENCOUNTER
"Yellow brown discharge coming from incision site that smells.  Reason for Disposition  • [1] Pus or bad-smelling fluid draining from incision AND [2] no fever  • Severe pain in the incision    Answer Assessment - Initial Assessment Questions  1. SYMPTOM: \"What's the main symptom you're concerned about?\" (e.g., redness, pain, drainage)      Her rt foot has pain with yellow, brown discharge and more swollen than it was two days ago.  2. ONSET: \"When did symptoms  start?\"      Last night  3. SURGERY: \"What surgery was performed?\"      Ankle surgery  4. DATE of SURGERY: \"When was surgery performed?\"       5/3/2024  5. INCISION SITE: \"Where is the incision located?\"       Still covered with gauze  6. REDNESS: \"Is there any redness at the incision site?\" If yes, ask: \"How wide across is the redness?\" (Inches, centimeters)       Toes are not red.  7. PAIN: \"Is there any pain?\" If Yes, ask: \"How bad is it?\"  (Scale 1-10; or mild, moderate, severe)      10/10 with percocet 10 minutes ago. When she took the last percocet it did not help 10 am  8. BLEEDING: \"Is there any bleeding?\" If Yes, ask: \"How much?\" and \"Where?\"      none  9. DRAINAGE: \"Is there any drainage from the incision site?\" If yes, ask: \"What color and how much?\" (e.g., red, cloudy, pus; drops, teaspoon)      Foul smelling yellow brown drainage.  10. FEVER: \"Do you have a fever?\" If Yes, ask: \"What is your temperature, how was it measured, and when did it start?\"        none  11. OTHER SYMPTOMS: \"Do you have any other symptoms?\" (e.g., shaking chills, weakness, rash elsewhere on body)        None  Follow up date 5/9/2024    Protocols used: Post-Op Incision Symptoms and Questions-ADULT-    "

## 2024-05-09 ENCOUNTER — OFFICE VISIT (OUTPATIENT)
Dept: PODIATRY | Facility: CLINIC | Age: 49
End: 2024-05-09

## 2024-05-09 VITALS — SYSTOLIC BLOOD PRESSURE: 110 MMHG | HEART RATE: 97 BPM | DIASTOLIC BLOOD PRESSURE: 74 MMHG

## 2024-05-09 DIAGNOSIS — S82.851A CLOSED TRIMALLEOLAR FRACTURE OF RIGHT ANKLE, INITIAL ENCOUNTER: ICD-10-CM

## 2024-05-09 PROCEDURE — 99024 POSTOP FOLLOW-UP VISIT: CPT | Performed by: PODIATRIST

## 2024-05-09 NOTE — PROGRESS NOTES
Assessment/Plan:      Diagnoses and all orders for this visit:    Closed trimalleolar fracture of right ankle, initial encounter  -     Ambulatory Referral to Orthopedic Surgery      Patient is stable postop 1 weeks    Incision: stable    Instructions given to patient. Rest the foot as much as possible and elevate/ice  if swollen.    Ambulatory status: NWB right foot and ankle    Images reviewed today: postop images discussed, no acute concerns    RTC: 1 week. RICE protocol.       Subjective:     Patient ID: Latha Liu is a 49 y.o. female.    DOS: 5/3/2024     Procedure: right trimalleolar fracture ORIF     Condition: Dressing C/D/I. PAin slowly improving          Review of Systems      Objective:     Physical Exam  Vitals reviewed.   Constitutional:       Appearance: She is not ill-appearing or diaphoretic.   Cardiovascular:      Rate and Rhythm: Normal rate.      Pulses: Normal pulses.   Pulmonary:      Effort: Pulmonary effort is normal. No respiratory distress.   Skin:     Capillary Refill: Capillary refill takes less than 2 seconds.      Findings: No erythema or rash.   Neurological:      Mental Status: She is alert and oriented to person, place, and time.      Sensory: No sensory deficit.      Gait: Gait normal.   Psychiatric:         Mood and Affect: Mood normal.       Right ankle stable. No calf pain  Incisions stable, staples intact.   Passive ankle ROM limited due to edema, no crepitus.

## 2024-05-16 ENCOUNTER — OFFICE VISIT (OUTPATIENT)
Dept: PODIATRY | Facility: CLINIC | Age: 49
End: 2024-05-16

## 2024-05-16 VITALS — HEART RATE: 90 BPM | SYSTOLIC BLOOD PRESSURE: 104 MMHG | DIASTOLIC BLOOD PRESSURE: 72 MMHG

## 2024-05-16 DIAGNOSIS — S82.851A CLOSED TRIMALLEOLAR FRACTURE OF RIGHT ANKLE, INITIAL ENCOUNTER: Primary | ICD-10-CM

## 2024-05-16 PROCEDURE — 99024 POSTOP FOLLOW-UP VISIT: CPT | Performed by: PODIATRIST

## 2024-05-16 NOTE — PROGRESS NOTES
Assessment/Plan:      Diagnoses and all orders for this visit:    Closed trimalleolar fracture of right ankle, initial encounter      Patient is stable postop 2 weeks    Incision: healed, steri strips applied after staples removed. Use ofr 7 more days, light cotton dressing and ace wrap. RICE protocol discussed    Instructions given to patient. Rest the foot as much as possible and elevate/ice  if swollen.    Ambulatory status: NWB    Images reviewed today: none    RTC: 4 weeks. She is doing well at 2 weeks      Subjective:     Patient ID: Latha Liu is a 49 y.o. female.    DOS: 5/3/2024     Procedure: right trimalleolar fracture ORIF     Condition: Dressing C/D/I. PAin much better. No acute concerns. She is 2 weeks postop        Review of Systems    As stated in HPI, otherwise normal      Objective:     Physical Exam  Vitals reviewed.   Cardiovascular:      Pulses: Normal pulses.   Pulmonary:      Effort: No respiratory distress.   Musculoskeletal:         General: Swelling and tenderness (expected tenderness riight ankle. No calf pain.) present.   Skin:     Capillary Refill: Capillary refill takes less than 2 seconds.      Findings: Bruising present. No erythema.   Neurological:      Mental Status: She is alert.      Sensory: No sensory deficit (normal foot sensation, specifically sural nerve sensation normal).

## 2024-05-17 ENCOUNTER — TELEPHONE (OUTPATIENT)
Age: 49
End: 2024-05-17

## 2024-05-17 NOTE — TELEPHONE ENCOUNTER
Caller: Yahaira Barrett    Doctor and/or Office: Dr. Owens/[facility]#: [phone]    Escalation: Surgery Calling on behalf of her mom, Latha, patient is requesting a script for pain meds. Please return call. Thank you

## 2024-06-13 ENCOUNTER — OFFICE VISIT (OUTPATIENT)
Dept: PODIATRY | Facility: CLINIC | Age: 49
End: 2024-06-13

## 2024-06-13 ENCOUNTER — APPOINTMENT (OUTPATIENT)
Dept: RADIOLOGY | Age: 49
End: 2024-06-13
Payer: COMMERCIAL

## 2024-06-13 DIAGNOSIS — T14.8XXA BLISTER: ICD-10-CM

## 2024-06-13 DIAGNOSIS — G89.18 POST-OP PAIN: Primary | ICD-10-CM

## 2024-06-13 DIAGNOSIS — G89.18 POST-OP PAIN: ICD-10-CM

## 2024-06-13 DIAGNOSIS — S82.851A CLOSED TRIMALLEOLAR FRACTURE OF RIGHT ANKLE, INITIAL ENCOUNTER: ICD-10-CM

## 2024-06-13 PROCEDURE — 73610 X-RAY EXAM OF ANKLE: CPT

## 2024-06-13 PROCEDURE — 99024 POSTOP FOLLOW-UP VISIT: CPT | Performed by: PODIATRIST

## 2024-06-13 NOTE — PATIENT INSTRUCTIONS
FOr 2 weeks begin to walk in your boot    In 2-3 weeks, begin physical therapy to transition into a sneaker.     Progress will be slow. Be positive and patient

## 2024-06-13 NOTE — PROGRESS NOTES
Assessment/Plan:      Diagnoses and all orders for this visit:    Post-op pain  -     XR ankle 3+ vw right; Future  -     Ambulatory Referral to Physical Therapy; Future    Closed trimalleolar fracture of right ankle, initial encounter  -     Ambulatory Referral to Physical Therapy; Future    Blister  Comments:  has been cleaning fracture blister daily with peroxide. Stop doing this. cLean with soap and water. COver with alginate (provided)      Patient is stable postop 6 weeks    Incision: healed    Instructions given to patient. Rest the foot as much as possible and elevate/ice  if swollen.    Ambulatory status: begin WB in CAM. In 2 weeks begin PT to transition out of CAM to sneaker    Images reviewed today: XR right ankle intact hardware, mortise reduction maintained. NO acute concerns    RTC: 6 weeks     Subjective:     Patient ID: Latha Liu is a 49 y.o. female.    DOS: 5/3/2024     Procedure: right trimalleolar fracture ORIF     Condition: 6 weeks postop. She has some stiffness and edema but pain is minimal.         Review of Systems      Objective:     Physical Exam  Vitals reviewed.   Constitutional:       Appearance: She is not ill-appearing or diaphoretic.   Cardiovascular:      Rate and Rhythm: Normal rate.      Pulses: Normal pulses.   Pulmonary:      Effort: Pulmonary effort is normal. No respiratory distress.   Skin:     Capillary Refill: Capillary refill takes less than 2 seconds.      Findings: No erythema or rash.   Neurological:      Mental Status: She is alert and oriented to person, place, and time.      Sensory: No sensory deficit.   Psychiatric:         Mood and Affect: Mood normal.       Right ankle DF/PF without pain or creptius, normal ROM  N/V at baseline  No calf pain. Able to WB unassisted but stiff/achy reports  No paige instability of right ankle  Extensor, flexor, achilles and peroneals intact  Stable fracture blister anterior ankle, no SOI

## 2024-07-02 ENCOUNTER — EVALUATION (OUTPATIENT)
Dept: PHYSICAL THERAPY | Facility: CLINIC | Age: 49
End: 2024-07-02
Payer: COMMERCIAL

## 2024-07-02 DIAGNOSIS — S82.851A CLOSED TRIMALLEOLAR FRACTURE OF RIGHT ANKLE, INITIAL ENCOUNTER: ICD-10-CM

## 2024-07-02 DIAGNOSIS — G89.18 POST-OP PAIN: ICD-10-CM

## 2024-07-02 PROCEDURE — 97161 PT EVAL LOW COMPLEX 20 MIN: CPT

## 2024-07-02 PROCEDURE — 97110 THERAPEUTIC EXERCISES: CPT

## 2024-07-02 NOTE — PROGRESS NOTES
PT Evaluation     Today's date: 2024  Patient name: Latha Liu  : 1975  MRN: [id number]  Referring provider: Nghia Owens D*  Dx:   Encounter Diagnosis     ICD-10-CM    1. Post-op pain  G89.18 Ambulatory Referral to Physical Therapy      2. Closed trimalleolar fracture of right ankle, initial encounter  S82.851A Ambulatory Referral to Physical Therapy          Start Time: 1140  Stop Time: 1225  Total time in clinic (min): 45 minutes    Assessment  Impairments: abnormal muscle firing, abnormal or restricted ROM, activity intolerance, impaired physical strength, lacks appropriate home exercise program, pain with function and poor body mechanics  Symptom irritability: moderate    Assessment details: Pt is a 49 y.o. year old female presenting to physical therapy for Post-op pain and Closed trimalleolar fracture of right ankle, initial encounter.  She presents with the following impairments: significant R foot and ankle swelling, significant weakness of all R ankle motions upon MMT, significantly decreased R ankle AROM, and difficulty with weight bearing on RLE affecting her function with walking, standing, navigating stairs, ADLs, and functional ability.  Pt will benefit from skilled physical therapy to address functional limitations noted in evaluation and meet patient goals.   Pt was educated to try weaning more out of the CAM boot. Pt was also educated on proper use of ice at home and to apply it 2-3 times a day to decrease swelling.   Barriers to therapy: S/p R Trimalleolar fracture of ankle with subsequent ORIF on 5/3/24.  Understanding of Dx/Px/POC: good     Prognosis: good    Goals  ST. Pt will be independent with HEP.  2. Pt will improve R ankle DF AROM to 0 degrees.  3. Pt will improve R foot and ankle swelling to WNL compared to LLE.  LT. Pt will improve pain at rest to 2/10.  2. Pt will improve RLE strength grossly by 2 grades or more to improve functional ability.   3. Pt  will improve R ankle AROM to WNL compared to LLE.     Plan  Patient would benefit from: PT eval and skilled physical therapy  Planned modality interventions: biofeedback, manual electrical stimulation, microcurrent electrical stimulation, TENS, electrical stimulation/Russian stimulation, thermotherapy: hydrocollator packs, cryotherapy and unattended electrical stimulation    Planned therapy interventions: abdominal trunk stabilization, joint mobilization, manual therapy, massage, ADL retraining, neuromuscular re-education, body mechanics training, patient education, postural training, strengthening, stretching, therapeutic activities, therapeutic exercise, flexibility, functional ROM exercises and home exercise program    Frequency: 2x week  Duration in weeks: 6  Treatment plan discussed with: patient        Subjective Evaluation    History of Present Illness  Mechanism of injury: Pt presents to the clinic with history of trimalleolar fracture of R ankle with subsequent ORIF on 5/3/234. Pt presents to the clinic today wearing CAM boot, MD stated to transition out of CAM boot into sneaker. Pt stated that she has been having a lot of pain swelling since the surgery and it gets worse at the end of the day. Pt stated that she has not been icing the foot and ankle. Pt stated that she has been walking a little bit without her CAM boot barefoot in her house but is painful, pt has not tried ambulation with sneaker yet. Pt stated that she was not given exercises to perform at home so has just been trying to walk to keep it moving.   Patient Goals  Patient goals for therapy: increased motion, improved balance, decreased pain, decreased edema, increased strength, independence with ADLs/IADLs, return to sport/leisure activities and return to work    Pain  Current pain ratin  At best pain ratin  At worst pain rating: 10  Quality: discomfort, dull ache, sharp, tight, throbbing and pressure          Objective     Palpation  "  Left   No palpable tenderness to the anterior tibialis, lateral gastrocnemius, medial gastrocnemius and posterior tibialis.     Right   Tenderness of the anterior tibialis, lateral gastrocnemius, medial gastrocnemius and posterior tibialis.     Tenderness     Right Ankle/Foot   Tenderness in the Achilles insertion, anterior ankle, fifth metatarsal base, lateral malleolus, medial malleolus, peroneal tendon and plantar fascia.     Active Range of Motion   Left Ankle/Foot   Dorsiflexion (ke): 8 degrees   Plantar flexion: 60 degrees   Inversion: 26 degrees   Eversion: 22 degrees     Right Ankle/Foot   Dorsiflexion (ke): -10 degrees   Plantar flexion: 35 degrees   Inversion: 8 degrees   Eversion: 6 degrees     Strength/Myotome Testing     Left Hip   Planes of Motion   Flexion: 5    Right Hip   Planes of Motion   Flexion: 4    Left Knee   Extension: 4    Right Knee   Extension: 4    Left Ankle/Foot   Dorsiflexion: 5  Plantar flexion: 5  Inversion: 5  Eversion: 5    Right Ankle/Foot   Dorsiflexion: 2-  Plantar flexion: 2  Inversion: 2  Eversion: 2-    Swelling   Left Ankle/Foot   Figure 8: 53 cm    Right Ankle/Foot   Figure 8: 58 cm             Precautions: s/p R Trimalleolar fracture of ankle with subsequent ORIF on 5/3/24.    Date 7/2            Visit # 1            FOTO IE             Re-eval IE              Manuals 7/2                                                                Neuro Re-Ed 7/2            Ankle pumps 30x            Ankle circles 20x c,cc            Ankle alphabet             Seated rockerboard             Seated heel raises 10x            marbles             Towel scrunches             SLS                                       Ther Ex 7/2            bike             Long sit calf str 3x30\" R            Standing heel raises             Seated heel slides 5x10\"            Ankle 4 way iso             Slantboard str             S/l leg press                          Ther Activity 7/2            squats  "            Step ups             Gait Training 7/2            Gait w sneaker nv                         Modalities 7/2            ice nv

## 2024-07-10 ENCOUNTER — OFFICE VISIT (OUTPATIENT)
Dept: PHYSICAL THERAPY | Facility: CLINIC | Age: 49
End: 2024-07-10
Payer: COMMERCIAL

## 2024-07-10 DIAGNOSIS — G89.18 POST-OP PAIN: Primary | ICD-10-CM

## 2024-07-10 DIAGNOSIS — S82.851A CLOSED TRIMALLEOLAR FRACTURE OF RIGHT ANKLE, INITIAL ENCOUNTER: ICD-10-CM

## 2024-07-10 PROCEDURE — 97140 MANUAL THERAPY 1/> REGIONS: CPT

## 2024-07-10 PROCEDURE — 97110 THERAPEUTIC EXERCISES: CPT

## 2024-07-10 NOTE — PROGRESS NOTES
"Daily Note     Today's date: 7/10/2024  Patient name: Latha Liu  : 1975  MRN: [id number]  Referring provider: Nghia Owens D*  Dx:   Encounter Diagnosis     ICD-10-CM    1. Post-op pain  G89.18       2. Closed trimalleolar fracture of right ankle, initial encounter  S82.851A           Start Time: 1450  Stop Time: 1530  Total time in clinic (min): 40 minutes    Subjective: Pt presents to PT with reports of increased pain and swelling in the ankle. Pt states she was walking around the house without the boot and stumbled and placed all of her weight on her R ankle and now it is swollen.       Objective: See treatment diary below      Assessment: Tolerated treatment well. Patient demonstrated fatigue post treatment and would benefit from continued PT. Pt performed exercises as noted with no signs of increased pain of adverse symptoms. Pt educated on prognosis of ankle therapy and possibility of ankle swelling. Pt will benefit from further skilled PT to increase strength, flexibility and function. Continue to progress as able.       Plan: Continue per plan of care.      Precautions: s/p R Trimalleolar fracture of ankle with subsequent ORIF on 5/3/24.    Date 7/2 7/10           Visit # 1 2           FOTO IE             Re-eval IE              Manuals 7/2 7/10                                                               Neuro Re-Ed 7/2 7/10           Ankle pumps 30x 30x           Ankle circles 20x c,cc 20x C, CC           Ankle alphabet  1x           Seated rockerboard  nv           Seated heel raises 10x 2x10           marbles  nv           Towel scrunches  nv           SLS  nv                                     Ther Ex 7/2 7/10           bike  5'           Long sit calf str 3x30\" R 3x30\" R           Standing heel raises  Seated 20x           Seated heel slides 5x10\" 10x10\"           Ankle 4 way iso  MRE           Slantboard str  nv           S/l leg press  nv                        Ther Activity " 7/2 7/10           squats             Step ups             Gait Training 7/2 7/10           Gait w sneaker nv HY                        Modalities 7/2 7/10           ice nv

## 2024-07-15 ENCOUNTER — OFFICE VISIT (OUTPATIENT)
Dept: PHYSICAL THERAPY | Facility: CLINIC | Age: 49
End: 2024-07-15
Payer: COMMERCIAL

## 2024-07-15 DIAGNOSIS — S82.851A CLOSED TRIMALLEOLAR FRACTURE OF RIGHT ANKLE, INITIAL ENCOUNTER: ICD-10-CM

## 2024-07-15 DIAGNOSIS — G89.18 POST-OP PAIN: Primary | ICD-10-CM

## 2024-07-15 PROCEDURE — 97110 THERAPEUTIC EXERCISES: CPT

## 2024-07-15 PROCEDURE — 97140 MANUAL THERAPY 1/> REGIONS: CPT

## 2024-07-15 PROCEDURE — 97112 NEUROMUSCULAR REEDUCATION: CPT

## 2024-07-15 NOTE — PROGRESS NOTES
Daily Note     Today's date: 7/15/2024  Patient name: Latha Liu  : 1975  MRN: [id number]  Referring provider: Nghia Owens D*  Dx:   Encounter Diagnosis     ICD-10-CM    1. Post-op pain  G89.18       2. Closed trimalleolar fracture of right ankle, initial encounter  S82.851A           Start Time: 1500  Stop Time: 1550  Total time in clinic (min): 50 minutes    Subjective: Pt stated that she is feeling much better compared to last visit where she twisted her ankle prior to the session. Pt presented to the clinic with CAM boot but changed into sneaker at start of session.       Objective: See treatment diary below      Assessment: Pt tolerated warm up on bike well at beginning of session without increase in discomfort during or after activity. Pt completed entirety of session in sneaker. Pt was challenged with weight bearing activities throughout session with sneaker on RLE but was able to complete all activities with mild soreness post performance. Pt tolerated manual R ankle PROM and stretching with reported decrease in stiffness post manual treatment. Pt tolerated light resistance with ankle 4 way activity and was educated on proper performance at home as part of HEP, was given TB to use at home. Pt could not tolerate ice well at end of session due to it being too cold, trial again next visit with increased layers to see if pt tolerates it better. Pt would benefit from continued skilled PT to improve R foot and ankle strength, mobility, ROM, swelling, balance, gait, and functional ability.    Plan: Continue per plan of care.  Progress treatment as tolerated.       Precautions: s/p R Trimalleolar fracture of ankle with subsequent ORIF on 5/3/24.    Date 7/2 7/10 7/15          Visit # 1 2 3          FOTO IE             Re-eval IE              Manuals 7/2 7/10 7/15          R ankle PROM                                                    Neuro Re-Ed 7/2 7/10 7/15          Ankle pumps 30x 30x 30x    "       Ankle circles 20x c,cc 20x C, CC           Ankle alphabet  1x nv          Seated rockerboard  nv 20x f/b, 10x s/s stand          Seated heel raises 10x 2x10           marbles  nv 1x R          Towel scrunches  nv           SLS  nv 20x w hand lift ups from bar                                    Ther Ex 7/2 7/10 7/15          bike  5' 6'          Long sit calf str 3x30\" R 3x30\" R nv          Standing heel raises  Seated 20x 20x           Seated heel slides 5x10\" 10x10\" 10x10\"          Ankle 4 way iso  MRE 20x OTB ea R          Slantboard str  nv 5x20\" R          S/l leg press  nv 2x10 35# R                       Ther Activity 7/2 7/10           squats             Step ups             Gait Training 7/2 7/10 7/15          Gait w sneaker nv HY Around clinic                       Modalities 7/2 7/10 7/15          ice nv  3'* (more layers nv)                              "

## 2024-07-17 ENCOUNTER — APPOINTMENT (OUTPATIENT)
Dept: PHYSICAL THERAPY | Facility: CLINIC | Age: 49
End: 2024-07-17
Payer: COMMERCIAL

## 2024-07-22 ENCOUNTER — OFFICE VISIT (OUTPATIENT)
Dept: PHYSICAL THERAPY | Facility: CLINIC | Age: 49
End: 2024-07-22
Payer: COMMERCIAL

## 2024-07-22 DIAGNOSIS — G89.18 POST-OP PAIN: Primary | ICD-10-CM

## 2024-07-22 DIAGNOSIS — S82.851A CLOSED TRIMALLEOLAR FRACTURE OF RIGHT ANKLE, INITIAL ENCOUNTER: ICD-10-CM

## 2024-07-22 PROCEDURE — 97010 HOT OR COLD PACKS THERAPY: CPT

## 2024-07-22 PROCEDURE — 97140 MANUAL THERAPY 1/> REGIONS: CPT

## 2024-07-22 PROCEDURE — 97110 THERAPEUTIC EXERCISES: CPT

## 2024-07-22 NOTE — PROGRESS NOTES
Daily Note     Today's date: 2024  Patient name: Latha Liu  : 1975  MRN: [id number]  Referring provider: Nghia Owens D*  Dx:   Encounter Diagnosis     ICD-10-CM    1. Post-op pain  G89.18       2. Closed trimalleolar fracture of right ankle, initial encounter  S82.851A           Start Time: 1630  Stop Time: 1720  Total time in clinic (min): 50 minutes    Subjective: Pt stated that she feels like her foot and ankle are getting there but continues to have a lot of swelling as well as pain with weight bearing activities.       Objective: See treatment diary below      Assessment: Pt tolerated warm up on bike well at beginning of session without increase in discomfort during or after activity. Pt had pain and difficulty with performance of standing slantboard stretching today as well as brief single leg balance activity with mild UE support. Pt tolerated single leg press activity but had mild pain when returning to neutral position due to going into dorsiflexed position. Pt tolerated manual R ankle stretching and PROM with reported decrease in stiffness post manual treatment. Pt showed slight improvement in R ankle strength with ankle 4 way activity but continues to have observable muscle fatigue as pt gets further into sets of repetitions, educated to continue at home as part of HEP as tolerated. Pt tolerated application of ice at end of session well with reported decrease in discomfort post modality. Pt would benefit from continued skilled PT to improve R ankle strength, swelling, mobility, flexibility, ROM, balance, gait, and functional ability.     Plan: Continue per plan of care.  Progress treatment as tolerated.       Precautions: s/p R Trimalleolar fracture of ankle with subsequent ORIF on 5/3/24.    Date 7/2 7/10 7/15 7/22         Visit # 1 2 3 4         FOTO IE             Re-eval IE              Manuals 7/2 7/10 7/15 7/22         R ankle PROM                                           "          Neuro Re-Ed 7/2 7/10 7/15 7/22         Ankle pumps 30x 30x 30x          Ankle circles 20x c,cc 20x C, CC  20x c,cc         Ankle alphabet  1x nv 1x         Seated rockerboard  nv 20x f/b, 10x s/s stand 20x f/b, s/s stand         Seated heel raises 10x 2x10           marbles  nv 1x R          Towel scrunches  nv           SLS  nv 20x w hand lift ups from bar 20x w hand lift ups from bar*                                   Ther Ex 7/2 7/10 7/15 7/22         bike  5' 6' 6'         Long sit calf str 3x30\" R 3x30\" R nv 5x20\" R         Standing heel raises  Seated 20x 20x  20x UE sup         Seated heel slides 5x10\" 10x10\" 10x10\"          Ankle 4 way iso  MRE 20x OTB ea R 20x OTB ea R         Slantboard str  nv 5x20\" R 5x20\" R         S/l leg press  nv 2x10 35# R 2x10 35# R                      Ther Activity 7/2 7/10           squats             Step ups             Gait Training 7/2 7/10 7/15 7/22         Gait w sneaker nv HY Around clinic Around clinic                      Modalities 7/2 7/10 7/15 7/22         ice nv  3'* (more layers nv) 10' post 2 cases                               "

## 2024-07-25 ENCOUNTER — APPOINTMENT (OUTPATIENT)
Dept: RADIOLOGY | Age: 49
End: 2024-07-25
Payer: COMMERCIAL

## 2024-07-25 ENCOUNTER — OFFICE VISIT (OUTPATIENT)
Dept: PHYSICAL THERAPY | Facility: CLINIC | Age: 49
End: 2024-07-25
Payer: COMMERCIAL

## 2024-07-25 ENCOUNTER — OFFICE VISIT (OUTPATIENT)
Dept: PODIATRY | Facility: CLINIC | Age: 49
End: 2024-07-25

## 2024-07-25 DIAGNOSIS — M25.571 RIGHT ANKLE PAIN, UNSPECIFIED CHRONICITY: ICD-10-CM

## 2024-07-25 DIAGNOSIS — S82.851A CLOSED TRIMALLEOLAR FRACTURE OF RIGHT ANKLE, INITIAL ENCOUNTER: ICD-10-CM

## 2024-07-25 DIAGNOSIS — G89.18 POST-OP PAIN: Primary | ICD-10-CM

## 2024-07-25 DIAGNOSIS — S82.851D CLOSED TRIMALLEOLAR FRACTURE OF RIGHT ANKLE WITH ROUTINE HEALING, SUBSEQUENT ENCOUNTER: Primary | ICD-10-CM

## 2024-07-25 DIAGNOSIS — M25.471 RIGHT ANKLE SWELLING: ICD-10-CM

## 2024-07-25 PROCEDURE — 97110 THERAPEUTIC EXERCISES: CPT

## 2024-07-25 PROCEDURE — 97010 HOT OR COLD PACKS THERAPY: CPT

## 2024-07-25 PROCEDURE — 73610 X-RAY EXAM OF ANKLE: CPT

## 2024-07-25 PROCEDURE — 99024 POSTOP FOLLOW-UP VISIT: CPT | Performed by: PODIATRIST

## 2024-07-25 PROCEDURE — 97112 NEUROMUSCULAR REEDUCATION: CPT

## 2024-07-25 NOTE — PROGRESS NOTES
Assessment/Plan:      Diagnoses and all orders for this visit:    Closed trimalleolar fracture of right ankle with routine healing, subsequent encounter    Right ankle swelling  -     XR foot 3+ vw right; Future    Right ankle pain, unspecified chronicity  -     XR ankle 3+ vw right; Future      Patient is stable postop 12 weeks    Incision: healed. Small fracture blister anterior ankle is fully healed, small eschar. No SOI.    Instructions given to patient. Rest the foot as much as possible and elevate/ice  if swollen.    Ambulatory status: Continue PT, Progress will be slow at first.     Images reviewed today: XRays today show stable ORIF. NO hardware issues. The fractures are stable and show continued consolidation. NO lucency. Ankle mortise is anatomic    RTC: 6-8 weeks. Continue therapy. Make sure to do daily home exercises as well. The fracture is healed but getting the swelling reduced, increasing strength and flexibility takes time.       Subjective:     Patient ID: Latha Liu is a 49 y.o. female.    DOS: 5/3/2024     Procedure: right trimalleolar fracture ORIF     Condition: She is in PT for the ankle, seeing improvement but slow. The blister on the front of her ankle is slow to heal. Her ankle still swells a lot.         Review of Systems      Objective:     Physical Exam  Vitals reviewed.   Cardiovascular:      Pulses: Normal pulses.   Pulmonary:      Effort: No respiratory distress.   Musculoskeletal:         General: Swelling present. No deformity (overall ankle stable with rotational stress, drawer. Normal achilles. Resideual edema but not severe.. MMT 5/5 without pain. No ankle crepitus).   Neurological:      Mental Status: She is alert.

## 2024-07-25 NOTE — PROGRESS NOTES
Daily Note     Today's date: 2024  Patient name: Latha Liu  : 1975  MRN: [id number]  Referring provider: Nghia Owens D*  Dx:   Encounter Diagnosis     ICD-10-CM    1. Post-op pain  G89.18       2. Closed trimalleolar fracture of right ankle, initial encounter  S82.851A           Start Time: 1550  Stop Time: 1645  Total time in clinic (min): 55 minutes    Subjective: Pt stated that she just came from the doctor's who stated that her incision site and fracture look well with routine healing. Pt stated that she feels like it is getting better but progress is slow and continues to have a lot of swelling at the foot and ankle.       Objective: See treatment diary below      Assessment: Pt tolerated warm up on bike well at beginning of session without increase in discomfort during or after activity. Pt was challenged with addition of slow marching around clinic, pt could not raise contralateral leg high up due to pain and apprehension due to fear of falling, continue to progress as tolerated. Pt showed slight improvement in SLS ability with hand lift up activity, but had increased discomfort as activity progressed. Pt showed good improvement to s/l leg press activity with increased resistance today, continue to progress as tolerated. Pt performed ankle 4 way activity well with improved mobility and decreased compensation from tibia, progress resistance nv. Pt tolerated application of ice at end of session well with reported decrease in discomfort post modality. Pt would benefit from continued skilled PT to improve R ankle strength, mobility, flexibility, AROM, balance, gait, and functional ability.     Plan: Continue per plan of care.  Progress treatment as tolerated.       Precautions: s/p R Trimalleolar fracture of ankle with subsequent ORIF on 5/3/24.    Date 7/2 7/10 7/15 7/22 7/25        Visit # 1 2 3 4 5        FOTO IE     done        Re-eval IE              Manuals 7/2 7/10 7/15 7/22  "7/25        R ankle PROM     DK                                               Neuro Re-Ed 7/2 7/10 7/15 7/22 7/25        Ankle pumps 30x 30x 30x          Ankle circles 20x c,cc 20x C, CC  20x c,cc 20x c,cc        Ankle alphabet  1x nv 1x 1x        Seated rockerboard  nv 20x f/b, 10x s/s stand 20x f/b, s/s stand 20x f/b, s/s stand        Seated heel raises 10x 2x10   20x 8.8# KB RLE        marbles  nv 1x R          Towel scrunches  nv           SLS  nv 20x w hand lift ups from bar 20x w hand lift ups from bar* 20x3\" w hand lift ups                                  Ther Ex 7/2 7/10 7/15 7/22 7/25        bike  5' 6' 6' 6'        Long sit calf str 3x30\" R 3x30\" R nv 5x20\" R 5x20\" R        Standing heel raises  Seated 20x 20x  20x UE sup 20x UE sup        Seated heel slides 5x10\" 10x10\" 10x10\"          Ankle 4 way iso  MRE 20x OTB ea R 20x OTB ea R 20x OTB ea R (PTB nv)        Slantboard str  nv 5x20\" R 5x20\" R 5x20\" R        S/l leg press  nv 2x10 35# R 2x10 35# R 2x10 45# R                     Ther Activity 7/2 7/10           squats             Step ups             Gait Training 7/2 7/10 7/15 7/22 7/25        Gait w sneaker nv HY Around clinic Around clinic 3 laps slow marches                     Modalities 7/2 7/10 7/15 7/22 7/25        ice nv  3'* (more layers nv) 10' post 2 cases 10' post 2 cases                                "

## 2024-07-29 ENCOUNTER — APPOINTMENT (OUTPATIENT)
Dept: PHYSICAL THERAPY | Facility: CLINIC | Age: 49
End: 2024-07-29
Payer: COMMERCIAL

## 2024-07-31 ENCOUNTER — OFFICE VISIT (OUTPATIENT)
Dept: PHYSICAL THERAPY | Facility: CLINIC | Age: 49
End: 2024-07-31
Payer: COMMERCIAL

## 2024-07-31 DIAGNOSIS — G89.18 POST-OP PAIN: Primary | ICD-10-CM

## 2024-07-31 DIAGNOSIS — S82.851A CLOSED TRIMALLEOLAR FRACTURE OF RIGHT ANKLE, INITIAL ENCOUNTER: ICD-10-CM

## 2024-07-31 PROCEDURE — 97140 MANUAL THERAPY 1/> REGIONS: CPT

## 2024-07-31 PROCEDURE — 97110 THERAPEUTIC EXERCISES: CPT

## 2024-07-31 PROCEDURE — 97010 HOT OR COLD PACKS THERAPY: CPT

## 2024-07-31 NOTE — PROGRESS NOTES
Daily Note     Today's date: 2024  Patient name: Latha Liu  : 1975  MRN: [id number]  Referring provider: Nghia Owens D*  Dx:   Encounter Diagnosis     ICD-10-CM    1. Post-op pain  G89.18       2. Closed trimalleolar fracture of right ankle, initial encounter  S82.851A           Start Time: 1530  Stop Time: 1605  Total time in clinic (min): 35 minutes    Subjective: Pt stated that she has a lot of pain and swelling today because on Monday she was wearing her sneaker when walking outside on uneven sidewalk and stepped on it wrong and it twisted inwards. Pt stated that she was unable to do anything yesterday and rested all day inside.       Objective: See treatment diary below      Assessment: Pt tolerated manual R ankle PROM and stretching but had decreased movement and PROM today compared to last visit as well as increased discomfort. Pt had observable increase in swelling compared to last visit as well. Pt tolerated long sitting ankle mobility activities well today but session primarily focused on these as well as modality and manual treatment. Pt was educated on contacting her MD if symptoms persist over the next few days or continue to worsen, pt understood instruction.  Pt tolerated application of ice at end of session well with reported decrease in discomfort post modality. Plan to reintroduce weightbearing and strengthening activities as tolerated nv. Pt would benefit from continued skilled PT to improve R ankle strength, mobility, flexibility, weight bearing ability, balance, gait, and functional ability.       Plan: Continue per plan of care.  Progress treatment as tolerated.       Precautions: s/p R Trimalleolar fracture of ankle with subsequent ORIF on 5/3/24.    Date 7/2 7/10 7/15 7/22 7/25 7/31       Visit # 1 2 3 4 5 6       FOTO IE     done        Re-eval IE              Manuals 7/2 7/10 7/15 7/22 7/25 7/31       R ankle PROM     DK DK                                          "     Neuro Re-Ed 7/2 7/10 7/15 7/22 7/25 7/31       Ankle pumps 30x 30x 30x   30x       Ankle circles 20x c,cc 20x C, CC  20x c,cc 20x c,cc 20x c,cc       Ankle alphabet  1x nv 1x 1x 1x       Seated rockerboard  nv 20x f/b, 10x s/s stand 20x f/b, s/s stand 20x f/b, s/s stand        Seated heel raises 10x 2x10   20x 8.8# KB RLE        marbles  nv 1x R          Towel scrunches  nv           SLS  nv 20x w hand lift ups from bar 20x w hand lift ups from bar* 20x3\" w hand lift ups                                  Ther Ex 7/2 7/10 7/15 7/22 7/25 7/31       bike  5' 6' 6' 6'        Long sit calf str 3x30\" R 3x30\" R nv 5x20\" R 5x20\" R 5x20\" R       Standing heel raises  Seated 20x 20x  20x UE sup 20x UE sup        Seated heel slides 5x10\" 10x10\" 10x10\"          Ankle 4 way iso  MRE 20x OTB ea R 20x OTB ea R 20x OTB ea R (PTB nv)        Slantboard str  nv 5x20\" R 5x20\" R 5x20\" R        S/l leg press  nv 2x10 35# R 2x10 35# R 2x10 45# R                     Ther Activity 7/2 7/10           squats             Step ups             Gait Training 7/2 7/10 7/15 7/22 7/25        Gait w sneaker nv HY Around clinic Around clinic 3 laps slow marches                     Modalities 7/2 7/10 7/15 7/22 7/25 7/31       ice nv  3'* (more layers nv) 10' post 2 cases 10' post 2 cases 10' post 2 cases 1 towel                                 "

## 2024-08-02 ENCOUNTER — TELEPHONE (OUTPATIENT)
Age: 49
End: 2024-08-02

## 2024-08-02 DIAGNOSIS — G89.29 ACUTE EXACERBATION OF CHRONIC LOW BACK PAIN: ICD-10-CM

## 2024-08-02 DIAGNOSIS — M54.50 ACUTE EXACERBATION OF CHRONIC LOW BACK PAIN: ICD-10-CM

## 2024-08-02 RX ORDER — NAPROXEN 500 MG/1
500 TABLET ORAL EVERY 12 HOURS PRN
Qty: 30 TABLET | Refills: 2 | Status: SHIPPED | OUTPATIENT
Start: 2024-08-02

## 2024-08-02 NOTE — TELEPHONE ENCOUNTER
Caller: Latha Liu    Doctor: Graciela     Reason for call: Patient states she has been going to physical therapy but can not tolerate the pain and swelling she is having on her ankle she is asking if she can be prescribed a pain medication ? She has been applying ice for the swelling and that is not helping either     Please advise     Call back#: [phone]

## 2024-08-05 ENCOUNTER — OFFICE VISIT (OUTPATIENT)
Dept: PHYSICAL THERAPY | Facility: CLINIC | Age: 49
End: 2024-08-05
Payer: COMMERCIAL

## 2024-08-05 DIAGNOSIS — G89.18 POST-OP PAIN: Primary | ICD-10-CM

## 2024-08-05 DIAGNOSIS — S82.851A CLOSED TRIMALLEOLAR FRACTURE OF RIGHT ANKLE, INITIAL ENCOUNTER: ICD-10-CM

## 2024-08-05 PROCEDURE — 97110 THERAPEUTIC EXERCISES: CPT

## 2024-08-05 PROCEDURE — 97140 MANUAL THERAPY 1/> REGIONS: CPT

## 2024-08-05 NOTE — PROGRESS NOTES
"Daily Note     Today's date: 2024  Patient name: Latha Liu  : 1975  MRN: [id number]  Referring provider: Nghia Owens D*  Dx:   Encounter Diagnosis     ICD-10-CM    1. Post-op pain  G89.18       2. Closed trimalleolar fracture of right ankle, initial encounter  S82.851A           Start Time: 1515  Stop Time: 1550  Total time in clinic (min): 35 minutes    Subjective: Pt stated that she continues to have a lot of pain and symptoms. Pt states she would like to do the same thing as last session.       Objective: See treatment diary below      Assessment: Pt tolerated manual R ankle PROM and stretching but had decreased movement and PROM today compared to last visit as well as increased discomfort. Pt continues to show increased swelling and edema in ankle. Plan to reintroduce weightbearing and strengthening activities as tolerated nv. Pt would benefit from continued skilled PT to improve R ankle strength, mobility, flexibility, weight bearing ability, balance, gait, and functional ability.       Plan: Continue per plan of care.  Progress treatment as tolerated.       Precautions: s/p R Trimalleolar fracture of ankle with subsequent ORIF on 5/3/24.    Date 7/2 7/10 7/15 7/22 7/25 7/31 8/5      Visit # 1 2 3 4 5 6 7      FOTO IE     done        Re-eval IE              Manuals 7/2 7/10 7/15 7/22 7/25 7/31 8/5      R ankle PROM     DK DK HY                                             Neuro Re-Ed 7/2 7/10 7/15 7/22 7/25 7/31 8      Ankle pumps 30x 30x 30x   30x 30x      Ankle circles 20x c,cc 20x C, CC  20x c,cc 20x c,cc 20x c,cc 20x c,cc      Ankle alphabet  1x nv 1x 1x 1x 1x      Seated rockerboard  nv 20x f/b, 10x s/s stand 20x f/b, s/s stand 20x f/b, s/s stand        Seated heel raises 10x 2x10   20x 8.8# KB RLE        marbles  nv 1x R          Towel scrunches  nv           SLS  nv 20x w hand lift ups from bar 20x w hand lift ups from bar* 20x3\" w hand lift ups                                " "  Ther Ex 7/2 7/10 7/15 7/22 7/25 7/31 8/5      bike  5' 6' 6' 6'        Long sit calf str 3x30\" R 3x30\" R nv 5x20\" R 5x20\" R 5x20\" R 5x20\" R      Standing heel raises  Seated 20x 20x  20x UE sup 20x UE sup        Seated heel slides 5x10\" 10x10\" 10x10\"          Ankle 4 way iso  MRE 20x OTB ea R 20x OTB ea R 20x OTB ea R (PTB nv)        Slantboard str  nv 5x20\" R 5x20\" R 5x20\" R        S/l leg press  nv 2x10 35# R 2x10 35# R 2x10 45# R                     Ther Activity 7/2 7/10           squats             Step ups             Gait Training 7/2 7/10 7/15 7/22 7/25  8/5      Gait w sneaker nv HY Around clinic Around clinic 3 laps slow marches                     Modalities 7/2 7/10 7/15 7/22 7/25 7/31 8/5      ice nv  3'* (more layers nv) 10' post 2 cases 10' post 2 cases 10' post 2 cases 1 towel 10' post 2 cases 1 towel                                "

## 2024-08-12 ENCOUNTER — OFFICE VISIT (OUTPATIENT)
Dept: PHYSICAL THERAPY | Facility: CLINIC | Age: 49
End: 2024-08-12
Payer: COMMERCIAL

## 2024-08-12 DIAGNOSIS — S82.851A CLOSED TRIMALLEOLAR FRACTURE OF RIGHT ANKLE, INITIAL ENCOUNTER: ICD-10-CM

## 2024-08-12 DIAGNOSIS — G89.18 POST-OP PAIN: Primary | ICD-10-CM

## 2024-08-12 PROCEDURE — 97110 THERAPEUTIC EXERCISES: CPT

## 2024-08-12 PROCEDURE — 97112 NEUROMUSCULAR REEDUCATION: CPT

## 2024-08-12 PROCEDURE — 97535 SELF CARE MNGMENT TRAINING: CPT

## 2024-08-12 PROCEDURE — 97140 MANUAL THERAPY 1/> REGIONS: CPT

## 2024-08-12 NOTE — PROGRESS NOTES
Daily Note     Today's date: 2024  Patient name: Latha Liu  : 1975  MRN: [id number]  Referring provider: Nghia Owens D*  Dx:   Encounter Diagnosis     ICD-10-CM    1. Post-op pain  G89.18       2. Closed trimalleolar fracture of right ankle, initial encounter  S82.253L                      Subjective: Pt presents to PT reporting pain remains grading the pain an 8/10.  She states swelling gets worse and better.  Pt reports decreased pain post PT session grading the pain a 7/10.       Objective: See treatment diary below      Assessment: Tolerated treatment fair secondary to pain and lack of ROM.  Noted numerous areas of scar tissue.  Educated pt and her  on proper STM constanza on R lateral incision and achilles.  He was advised to perform to patient's tolerance.  Pt also issued instructions about contrast baths: pt reports a verbal understanding.  Patient demonstrated fatigue post treatment, exhibited good technique with therapeutic exercises, and would benefit from continued PT to increase flexibility, strength and function.      Plan: Continue per plan of care.      Precautions: s/p R Trimalleolar fracture of ankle with subsequent ORIF on 5/3/24.    Date 7/2 7/10 7/15 7/22 7/25 7/31 8/5 8/12     Visit # 1 2 3 4 5 6 7 8     FOTO IE     done        Re-eval IE              Manuals 7/2 7/10 7/15 7/22 7/25 7/31 8/5 8/12     R ankle PROM     DK DK HY PK                                            Neuro Re-Ed 7/2 7/10 7/15 7/22 7/25 7/31 8/5 8/12     Ankle pumps 30x 30x 30x   30x 30x 30x     Inversion & eversion        15 x      Ankle circles 20x c,cc 20x C, CC  20x c,cc 20x c,cc 20x c,cc 20x c,cc 30x     Ankle alphabet  1x nv 1x 1x 1x 1x 1x     Seated rockerboard  nv 20x f/b, 10x s/s stand 20x f/b, s/s stand 20x f/b, s/s stand   nv     Seated heel raises 10x 2x10   20x 8.8# KB RLE   nv     marbles  nv 1x R          Towel scrunches  nv           SLS  nv 20x w hand lift ups from bar 20x w hand  "lift ups from bar* 20x3\" w hand lift ups                                  Ther Ex 7/2 7/10 7/15 7/22 7/25 7/31 8/5 8/12     bike  5' 6' 6' 6'        Long sit calf str 3x30\" R 3x30\" R nv 5x20\" R 5x20\" R 5x20\" R 5x20\" R 30\" x 3     Standing heel raises  Seated 20x 20x  20x UE sup 20x UE sup   nv     Seated heel slides 5x10\" 10x10\" 10x10\"          Ankle 4 way iso  MRE 20x OTB ea R 20x OTB ea R 20x OTB ea R (PTB nv)   nv     Slantboard str  nv 5x20\" R 5x20\" R 5x20\" R        S/l leg press  nv 2x10 35# R 2x10 35# R 2x10 45# R                     Ther Activity 7/2 7/10           squats             Step ups             Gait Training 7/2 7/10 7/15 7/22 7/25  8/5 8/12     Gait w sneaker nv HY Around clinic Around clinic 3 laps slow marches                     Modalities 7/2 7/10 7/15 7/22 7/25 7/31 8/5 8/12     ice nv  3'* (more layers nv) 10' post 2 cases 10' post 2 cases 10' post 2 cases 1 towel 10' post 2 cases 1 towel Deferred; states she will apply at home                                 "

## 2024-08-14 ENCOUNTER — APPOINTMENT (OUTPATIENT)
Dept: PHYSICAL THERAPY | Facility: CLINIC | Age: 49
End: 2024-08-14
Payer: COMMERCIAL

## 2024-08-15 ENCOUNTER — TELEPHONE (OUTPATIENT)
Age: 49
End: 2024-08-15

## 2024-08-15 NOTE — TELEPHONE ENCOUNTER
Caller: Latha     Doctor and/or Office: Dr Owens/ [facility]#: [phone]     Escalation: Care Patient states the blister opened up where she had sx and is very sore.  Please advise thank you

## 2024-08-19 ENCOUNTER — OFFICE VISIT (OUTPATIENT)
Dept: PHYSICAL THERAPY | Facility: CLINIC | Age: 49
End: 2024-08-19
Payer: COMMERCIAL

## 2024-08-19 DIAGNOSIS — S82.851A CLOSED TRIMALLEOLAR FRACTURE OF RIGHT ANKLE, INITIAL ENCOUNTER: ICD-10-CM

## 2024-08-19 DIAGNOSIS — G89.18 POST-OP PAIN: Primary | ICD-10-CM

## 2024-08-19 PROCEDURE — 97112 NEUROMUSCULAR REEDUCATION: CPT

## 2024-08-19 PROCEDURE — 97110 THERAPEUTIC EXERCISES: CPT

## 2024-08-19 PROCEDURE — 97140 MANUAL THERAPY 1/> REGIONS: CPT

## 2024-08-19 NOTE — PROGRESS NOTES
Daily Note     Today's date: 2024  Patient name: Ltaha Liu  : 1975  MRN: [id number]  Referring provider: Nghia Owens D*  Dx:   Encounter Diagnosis     ICD-10-CM    1. Post-op pain  G89.18       2. Closed trimalleolar fracture of right ankle, initial encounter  S82.885O                      Subjective: Pt states her blister has gotten worse since her last appointment. The blister is red in appearance with self reported red drainage intermittently. No apparent signs of infection. Pt states she will keep her doctor updated. Will continue to monitor blister in future appointments. Pt states her ankle is still sore over the anterior ankle. Slow capillary refill.       Objective: See treatment diary below      Assessment: Tolerated treatment well. Patient would benefit from continued PT    Pt presents to the clinic s/p R trimalleolar fx. Main aim of today's session was overall ankle mobility and strength in hopes to improve function and pain. Pt make progress in ankle strength this date as marked by 4-way ankle pumps with OTB. Pt remains sensitive to global movement at the ankle with pain with most movements. Towel scrunches and bosu ball pushes were done today to improve recruitment of foot musculature. Pt found towel scrunches to be painful with education to not squeeze as hard. Will continue to prescribe ankle mobility and strength according to pt's symptoms.       Plan: Continue per plan of care.      Precautions: s/p R Trimalleolar fracture of ankle with subsequent ORIF on 5/3/24.    Date 7/2 7/10 7/15 7/22 7/25 7/31 8/5 8/12 8/19    Visit # 1 2 3 4 5 6 7 8 9    FOTO IE     done        Re-eval IE              Manuals 7/2 7/10 7/15 7/22 7/25 7/31 8/5 8/12 8/19    R ankle PROM     DK DK HY PK NH                                           Neuro Re-Ed 7/2 7/10 7/15 7/22 7/25 7/31 8/5 8/12 8/19    Ankle pumps 30x 30x 30x   30x 30x 30x 30x    Inversion & eversion        15 x      Ankle circles  "20x c,cc 20x C, CC  20x c,cc 20x c,cc 20x c,cc 20x c,cc 30x 30x c,cc    Ankle alphabet  1x nv 1x 1x 1x 1x 1x     Seated rockerboard  nv 20x f/b, 10x s/s stand 20x f/b, s/s stand 20x f/b, s/s stand   nv 20x f/b, s/s    Seated heel raises 10x 2x10   20x 8.8# KB RLE   nv 2x20    marbles  nv 1x R          Towel scrunches  nv       1x20    SLS  nv 20x w hand lift ups from bar 20x w hand lift ups from bar* 20x3\" w hand lift ups        Seated bosu ball push         x20                 Ther Ex 7/2 7/10 7/15 7/22 7/25 7/31 8/5 8/12 8/19    bike  5' 6' 6' 6'        Long sit calf str 3x30\" R 3x30\" R nv 5x20\" R 5x20\" R 5x20\" R 5x20\" R 30\" x 3 30\" x 3    Standing heel raises  Seated 20x 20x  20x UE sup 20x UE sup   nv     Seated heel slides 5x10\" 10x10\" 10x10\"          Ankle 4 way iso  MRE 20x OTB ea R 20x OTB ea R 20x OTB ea R (PTB nv)   nv 1x15 OTB ea R    Slantboard str  nv 5x20\" R 5x20\" R 5x20\" R        S/l leg press  nv 2x10 35# R 2x10 35# R 2x10 45# R        Towel scrunches         3x30\"    Ther Activity 7/2 7/10           squats             Step ups             Gait Training 7/2 7/10 7/15 7/22 7/25  8/5 8/12     Gait w sneaker nv HY Around clinic Around clinic 3 laps slow marches                     Modalities 7/2 7/10 7/15 7/22 7/25 7/31 8/5 8/12     ice nv  3'* (more layers nv) 10' post 2 cases 10' post 2 cases 10' post 2 cases 1 towel 10' post 2 cases 1 towel Deferred; states she will apply at home deferred                                  "

## 2024-08-21 ENCOUNTER — OFFICE VISIT (OUTPATIENT)
Dept: PODIATRY | Facility: CLINIC | Age: 49
End: 2024-08-21
Payer: COMMERCIAL

## 2024-08-21 VITALS
HEIGHT: 64 IN | DIASTOLIC BLOOD PRESSURE: 71 MMHG | HEART RATE: 101 BPM | BODY MASS INDEX: 36.19 KG/M2 | WEIGHT: 212 LBS | SYSTOLIC BLOOD PRESSURE: 104 MMHG

## 2024-08-21 DIAGNOSIS — S82.851D CLOSED TRIMALLEOLAR FRACTURE OF RIGHT ANKLE WITH ROUTINE HEALING, SUBSEQUENT ENCOUNTER: Primary | ICD-10-CM

## 2024-08-21 DIAGNOSIS — M25.471 RIGHT ANKLE SWELLING: ICD-10-CM

## 2024-08-21 PROCEDURE — 99213 OFFICE O/P EST LOW 20 MIN: CPT | Performed by: PODIATRIST

## 2024-08-21 NOTE — PROGRESS NOTES
Assessment/Plan:      Diagnoses and all orders for this visit:    Closed trimalleolar fracture of right ankle with routine healing, subsequent encounter  -     Compression Stocking    Right ankle swelling  -     Compression Stocking      Diagnosis and options discussed with patient  Patient agreeable to the plan as stated below    She gets a lot of ankle swelling and discomfort. Almost all of her symptoms go away when it isn't swollen which is good news. I prescribed compression stocking, she should wear every day.     She has some tendonitis from walking in her CAM boot, this should also improve with PT, DC CAM boot and edema control    Her blister healed. Keep a bandaid over the scab to reduce trauma in sneaker.     Subjective:     Patient ID: Latha Liu is a 49 y.o. female.    DOS: 5/3/2024     Procedure: right trimalleolar fracture ORIF     Condition: She is in PT for the ankle but complaining of a lot of swelling. She called for an emergency appointment because she had a blister but that has healed. Her main complaint is swelling in the ankle. Her pain occurs when the ankle is swollen, she states when the ankle isn't swollen it feels pretty good.         Review of Systems    As stated in HPI, otherwise normal    Medical History Reviewed by provider this encounter:  Tobacco  Allergies  Meds  Problems  Med Hx  Surg Hx  Fam Hx      Objective:     Physical Exam  Vitals reviewed.   Constitutional:       General: She is not in acute distress.  Cardiovascular:      Rate and Rhythm: Normal rate.      Pulses: Normal pulses.   Pulmonary:      Effort: No respiratory distress.   Musculoskeletal:      Right ankle: Swelling present. No deformity, ecchymosis or lacerations. Tenderness (nonspecific ankle tenderness, her extensors tendons are mildly tender, MMT normal to extensors) present over the lateral malleolus and medial malleolus. No proximal fibula tenderness. Decreased range of motion. Anterior drawer test  negative.   Skin:     Capillary Refill: Capillary refill takes less than 2 seconds.      Findings: Lesion (scab anterior ankle, no drainage or cellulitis, no pain) present. No bruising or erythema.   Neurological:      Mental Status: She is alert.      Sensory: No sensory deficit.

## 2024-08-22 ENCOUNTER — OFFICE VISIT (OUTPATIENT)
Dept: PHYSICAL THERAPY | Facility: CLINIC | Age: 49
End: 2024-08-22
Payer: COMMERCIAL

## 2024-08-22 DIAGNOSIS — G89.18 POST-OP PAIN: Primary | ICD-10-CM

## 2024-08-22 DIAGNOSIS — S82.851A CLOSED TRIMALLEOLAR FRACTURE OF RIGHT ANKLE, INITIAL ENCOUNTER: ICD-10-CM

## 2024-08-22 PROCEDURE — 97110 THERAPEUTIC EXERCISES: CPT

## 2024-08-22 PROCEDURE — 97112 NEUROMUSCULAR REEDUCATION: CPT

## 2024-08-22 PROCEDURE — 97140 MANUAL THERAPY 1/> REGIONS: CPT

## 2024-08-22 NOTE — PROGRESS NOTES
Daily Note     Today's date: 2024  Patient name: Latha Liu  : 1975  MRN: [id number]  Referring provider: Nghia Owens D*  Dx:   Encounter Diagnosis     ICD-10-CM    1. Post-op pain  G89.18       2. Closed trimalleolar fracture of right ankle, initial encounter  S82.851A           Start Time: 1600  Stop Time: 1652  Total time in clinic (min): 52 minutes    Subjective: Pt presents to the clinic without CAM boot and sneaker on R foot. Pt stated that she continues to have discomfort when ambulating and her blister does bleed occasionally when she is walking.       Objective: See treatment diary below      Assessment: Pt tolerated warm up on bike well at beginning of session without increase in discomfort during or after activity. Pt tolerated manual R foot and ankle PROM and stretching with reported decrease in stiffness post manual treatment. Pt tolerated progression of resistance of ankle 4 way activity but did have mild to moderate soreness and fatigue post performance. Pt tolerated re introduction of standing exercises well but did have discomfort at end of session. Pt was able to perform single leg press activity today but could not perform full ROM as when the pt approached starting position had pain in the posterior aspect of the ankle and achilles tendon. Pt would benefit from continued skilled PT to improve R foot and ankle strength, mobility, ROM, flexibility, gait, balance, and functional ability.       Plan: Continue per plan of care.  Progress treatment as tolerated.       Precautions: s/p R Trimalleolar fracture of ankle with subsequent ORIF on 5/3/24.    Date 7/2 7/10 7/15 7/22 7/25 7/31 8/5 8/12 8/19 8/22   Visit # 1 2 3 4 5 6 7 8 9 10   FOTO IE     done        Re-eval IE              Manuals 7/2 7/10 7/15 7/22 7/25 7/31 8/5 8/12 8/19 8/22   R ankle PROM     DK DK HY PK NH DK                                          Neuro Re-Ed 7/2 7/10 7/15 7/22 7/25 7/31 8/5 8/12 8/19 8/22  "  Ankle pumps 30x 30x 30x   30x 30x 30x 30x    Inversion & eversion        15 x      Ankle circles 20x c,cc 20x C, CC  20x c,cc 20x c,cc 20x c,cc 20x c,cc 30x 30x c,cc 30x c   Ankle alphabet  1x nv 1x 1x 1x 1x 1x  1x   Seated rockerboard  nv 20x f/b, 10x s/s stand 20x f/b, s/s stand 20x f/b, s/s stand   nv 20x f/b, s/s 20x f/b standing   Seated heel raises 10x 2x10   20x 8.8# KB RLE   nv 2x20    marbles  nv 1x R          Towel scrunches  nv       1x20 30x   SLS  nv 20x w hand lift ups from bar 20x w hand lift ups from bar* 20x3\" w hand lift ups     20x3\" w hand lifts   Seated bosu ball push         x20                 Ther Ex 7/2 7/10 7/15 7/22 7/25 7/31 8/5 8/12 8/19 8/22   bike  5' 6' 6' 6'     6' sneaker   Long sit calf str 3x30\" R 3x30\" R nv 5x20\" R 5x20\" R 5x20\" R 5x20\" R 30\" x 3 30\" x 3 5x20\"   Standing heel raises  Seated 20x 20x  20x UE sup 20x UE sup   nv  20x UE sup   Seated heel slides 5x10\" 10x10\" 10x10\"          Ankle 4 way iso  MRE 20x OTB ea R 20x OTB ea R 20x OTB ea R (PTB nv)   nv 1x15 OTB ea R 15x ea PTB R   Slantboard str  nv 5x20\" R 5x20\" R 5x20\" R        S/l leg press  nv 2x10 35# R 2x10 35# R 2x10 45# R     2x10 35# RLE   DF mob on step          10x5\"   Towel scrunches         3x30\"    Ther Activity 7/2 7/10        8/22   squats             Step ups          15x 1R R up   Gait Training 7/2 7/10 7/15 7/22 7/25  8/5 8/12  8/22   Gait w sneaker nv HY Around clinic Around clinic 3 laps slow marches     3 laps slow march                Modalities 7/2 7/10 7/15 7/22 7/25 7/31 8/5 8/12     ice nv  3'* (more layers nv) 10' post 2 cases 10' post 2 cases 10' post 2 cases 1 towel 10' post 2 cases 1 towel Deferred; states she will apply at home deferred                                    "

## 2024-09-05 ENCOUNTER — OFFICE VISIT (OUTPATIENT)
Dept: PHYSICAL THERAPY | Facility: CLINIC | Age: 49
End: 2024-09-05
Payer: COMMERCIAL

## 2024-09-05 DIAGNOSIS — S82.851A CLOSED TRIMALLEOLAR FRACTURE OF RIGHT ANKLE, INITIAL ENCOUNTER: ICD-10-CM

## 2024-09-05 DIAGNOSIS — G89.18 POST-OP PAIN: Primary | ICD-10-CM

## 2024-09-05 PROCEDURE — 97112 NEUROMUSCULAR REEDUCATION: CPT

## 2024-09-05 PROCEDURE — 97110 THERAPEUTIC EXERCISES: CPT

## 2024-09-05 PROCEDURE — 97140 MANUAL THERAPY 1/> REGIONS: CPT

## 2024-09-05 NOTE — PROGRESS NOTES
Daily Note     Today's date: 2024  Patient name: Latha Liu  : 1975  MRN: [id number]  Referring provider: Nghia Owens D*  Dx:   Encounter Diagnosis     ICD-10-CM    1. Post-op pain  G89.18       2. Closed trimalleolar fracture of right ankle, initial encounter  S82.851A           Start Time: 1630  Stop Time: 1715  Total time in clinic (min): 45 minutes    Subjective: Pt stated that she is doing ok but does have some discomfort and and swelling especially at the end of the day after doing a lot of walking.       Objective: See treatment diary below      Assessment: Pt deferred warm up on bike at start of visit today. Pt tolerated manual R ankle PROM and stretching with reported decrease in stiffness post manual treatment. Pt was challenged appropriately with ankle 4 way with resistance today with mild fatigue and soreness post performance. Pt performed R ankle mobility activities with proper form and slightly improved ROM compared to last visit. Pt had difficulty with SLS activities and weightbearing exercises during session, continue to progress activities as tolerated. Pt would benefit from continued skilled PT to improve R ankle strength, mobility, flexibility, weight bearing ability, and functional ability.     Plan: Continue per plan of care.  Progress treatment as tolerated.       Precautions: s/p R Trimalleolar fracture of ankle with subsequent ORIF on 5/3/24.    Date    Visit # 11     6 7 8 9 10   FOTO             Re-eval               Manuals    R ankle PROM DK     DK HY PK NH DK                                          Neuro Re-Ed    Ankle pumps 20x     30x 30x 30x 30x    Inversion & eversion        15 x      Ankle circles 30x c,cc     20x c,cc 20x c,cc 30x 30x c,cc 30x c   Ankle alphabet 1x     1x 1x 1x  1x   Seated rockerboard nv       nv 20x f/b, s/s 20x f/b standing   Seated heel raises  "       nv 2x20    marbles             Towel scrunches         1x20 30x   SLS 15x3\" w hand lifts         20x3\" w hand lifts   Seated bosu ball push         x20                 Ther Ex 9/5 7/31 8/5 8/12 8/19 8/22   bike nv         6' sneaker   Long sit calf str 5x20\"      5x20\" R 5x20\" R 30\" x 3 30\" x 3 5x20\"   Standing heel raises 20x UE sup       nv  20x UE sup   Seated heel slides             Ankle 4 way iso 20x ea PTB R, GTB PF       nv 1x15 OTB ea R 15x ea PTB R   Slantboard str             S/l leg press 2x10 35# RLE         2x10 35# RLE   DF mob on step          10x5\"   Ther Activity 9/5 8/22   squats             Step ups nv         15x 1R R up   Gait Training 9/5 8/5 8/12 8/22   Gait w sneaker 3 laps slow march         3 laps slow march Modalities 7/31 8/5 8/12     ice      10' post 2 cases 1 towel 10' post 2 cases 1 towel Deferred; states she will apply at home deferred                                      "

## 2024-09-09 ENCOUNTER — APPOINTMENT (OUTPATIENT)
Dept: PHYSICAL THERAPY | Facility: CLINIC | Age: 49
End: 2024-09-09
Payer: COMMERCIAL

## 2024-09-12 ENCOUNTER — OFFICE VISIT (OUTPATIENT)
Dept: PODIATRY | Facility: CLINIC | Age: 49
End: 2024-09-12
Payer: COMMERCIAL

## 2024-09-12 DIAGNOSIS — S82.851D CLOSED TRIMALLEOLAR FRACTURE OF RIGHT ANKLE WITH ROUTINE HEALING, SUBSEQUENT ENCOUNTER: Primary | ICD-10-CM

## 2024-09-12 DIAGNOSIS — M25.471 RIGHT ANKLE SWELLING: ICD-10-CM

## 2024-09-12 PROCEDURE — 99213 OFFICE O/P EST LOW 20 MIN: CPT | Performed by: PODIATRIST

## 2024-09-12 NOTE — PROGRESS NOTES
Assessment/Plan:      Diagnoses and all orders for this visit:    Closed trimalleolar fracture of right ankle with routine healing, subsequent encounter  -     CT lower extremity wo contrast right; Future    Right ankle swelling  -     CT lower extremity wo contrast right; Future      Recent XR is stable but she is still having a lot of pain. On exam her ankle is not unstable and the only reports nonspecific vague pain around the ankle. I think her concerns is just normal postop swelling but just to be safe I have ordered a CT to make sure the fractures are healed.     Continue home PT for now, I  will call with CT results.     Subjective:     Patient ID: Latha Liu is a 49 y.o. female.    4 months postop right trimalleolar ankle ORIF. She is getting a lot of swelling. By the end of the day she has so much swelling and aches and pain. She was recommended a compression stocking but wasn't sure how to get it.         Review of Systems    As stated in HPI, otherwise normal    Medical History Reviewed by provider this encounter:  Tobacco  Allergies  Meds  Problems  Med Hx  Surg Hx  Fam Hx      Objective:     Physical Exam  Vitals reviewed.   Constitutional:       General: She is not in acute distress.  Cardiovascular:      Rate and Rhythm: Normal rate.      Pulses: Normal pulses.   Pulmonary:      Effort: Pulmonary effort is normal. No respiratory distress.   Musculoskeletal:         General: Swelling, tenderness and signs of injury present.   Skin:     Capillary Refill: Capillary refill takes less than 2 seconds.      Findings: No bruising or erythema.   Neurological:      Mental Status: She is alert.      Sensory: No sensory deficit.

## 2024-09-17 ENCOUNTER — APPOINTMENT (OUTPATIENT)
Dept: PHYSICAL THERAPY | Facility: CLINIC | Age: 49
End: 2024-09-17
Payer: COMMERCIAL

## 2024-09-19 ENCOUNTER — OFFICE VISIT (OUTPATIENT)
Dept: PHYSICAL THERAPY | Facility: CLINIC | Age: 49
End: 2024-09-19
Payer: COMMERCIAL

## 2024-09-19 DIAGNOSIS — S82.851A CLOSED TRIMALLEOLAR FRACTURE OF RIGHT ANKLE, INITIAL ENCOUNTER: ICD-10-CM

## 2024-09-19 DIAGNOSIS — G89.18 POST-OP PAIN: Primary | ICD-10-CM

## 2024-09-19 PROCEDURE — 97112 NEUROMUSCULAR REEDUCATION: CPT

## 2024-09-19 PROCEDURE — 97140 MANUAL THERAPY 1/> REGIONS: CPT

## 2024-09-19 PROCEDURE — 97110 THERAPEUTIC EXERCISES: CPT

## 2024-09-19 NOTE — PROGRESS NOTES
Daily Note     Today's date: 2024  Patient name: Latha Liu  : 1975  MRN: [id number]  Referring provider: Nghia Owens D*  Dx:   Encounter Diagnosis     ICD-10-CM    1. Post-op pain  G89.18       2. Closed trimalleolar fracture of right ankle, initial encounter  S82.851A           Start Time: 1615  Stop Time: 1700  Total time in clinic (min): 45 minutes    Subjective: Pt stated that she continues to have a lot of pain and swelling in the R ankle and is going to get a CT scan to see if everything is ok. MD note stated that everything seems to be stable but ordered a CT scan to be safe.       Objective: See treatment diary below      Assessment: Pt tolerated warm up on bike well at beginning of session without increase in discomfort during or after activity. Pt tolerated manual R ankle PROM and stretching with reported decrease in stiffness post manual treatment. Pt showed slight improvement in R ankle strength with increased resistance band today in all directions and was given increased resistance band to use at home as part of HEP. Pt was challenged with standing activities and single leg press exercise with mild soreness and discomfort at end of session, but had improved overall progress to all exercises. Pt would benefit from continued skilled PT to improve R ankle strength, mobility, gait, balance, and functional ability.       Plan: Continue per plan of care.  Progress treatment as tolerated.       Precautions: s/p R Trimalleolar fracture of ankle with subsequent ORIF on 5/3/24.    Date    Visit # 11 12    6 7 8 9 10   FOTO done            Re-eval               Manuals    R ankle PROM DK DK    DK HY PK NH DK                                          Neuro Re-Ed    Ankle pumps 20x     30x 30x 30x 30x    Inversion & eversion        15 x      Ankle circles 30x c,cc     20x c,cc 20x c,cc  "30x 30x c,cc 30x c   Ankle alphabet 1x 1x    1x 1x 1x  1x   Seated rockerboard nv       nv 20x f/b, s/s 20x f/b standing   Seated heel raises        nv 2x20    marbles             Towel scrunches         1x20 30x   SLS 15x3\" w hand lifts 20x3\" w hand lifts        20x3\" w hand lifts   Seated bosu ball push  20x stand       x20                 Ther Ex 9/5 9/19 7/31 8/5 8/12 8/19 8/22   bike nv 6'        6' sneaker   Long sit calf str 5x20\"  5x20\"    5x20\" R 5x20\" R 30\" x 3 30\" x 3 5x20\"   Standing heel raises 20x UE sup 30x UE sup      nv  20x UE sup   Seated heel slides             Ankle 4 way iso 20x ea PTB R, GTB PF 20x ea GTB R      nv 1x15 OTB ea R 15x ea PTB R   Slantboard str             S/l leg press 2x10 35# RLE 2x15 45# RLE        2x10 35# RLE   DF mob on step  20x3\"         10x5\"   Ther Activity 9/5 9/19 8/22   squats             Step ups nv 20x 1R R up        15x 1R R up   Gait Training 9/5 9/19 8/5 8/12 8/22   Gait w sneaker 3 laps slow march 3 laps slow march        3 laps slow march Modalities 7/31 8/5 8/12     ice      10' post 2 cases 1 towel 10' post 2 cases 1 towel Deferred; states she will apply at home deferred                                        "

## 2024-10-01 ENCOUNTER — OFFICE VISIT (OUTPATIENT)
Dept: PHYSICAL THERAPY | Facility: CLINIC | Age: 49
End: 2024-10-01
Payer: COMMERCIAL

## 2024-10-01 DIAGNOSIS — S82.851A CLOSED TRIMALLEOLAR FRACTURE OF RIGHT ANKLE, INITIAL ENCOUNTER: ICD-10-CM

## 2024-10-01 DIAGNOSIS — G89.18 POST-OP PAIN: Primary | ICD-10-CM

## 2024-10-01 PROCEDURE — 97140 MANUAL THERAPY 1/> REGIONS: CPT

## 2024-10-01 PROCEDURE — 97110 THERAPEUTIC EXERCISES: CPT

## 2024-10-01 PROCEDURE — 97112 NEUROMUSCULAR REEDUCATION: CPT

## 2024-10-01 NOTE — PROGRESS NOTES
Daily Note     Today's date: 10/1/2024  Patient name: Latha Liu  : 1975  MRN: [id number]  Referring provider: Nghia Owens D*  Dx:   Encounter Diagnosis     ICD-10-CM    1. Post-op pain  G89.18       2. Closed trimalleolar fracture of right ankle, initial encounter  S82.851A           Start Time: 1530  Stop Time: 1615  Total time in clinic (min): 45 minutes    Subjective: Pt stated that she continues to have pain in her R foot and ankle and that the blister on her anterior ankle feels and looks like it might open up again. Pt stated that she would like to start on TM today instead of bike.      Objective: See treatment diary below      Assessment: Pt tolerated warm up on TM well at beginning of session with mild discomfort post performance, plan to increase time walking on TM in future sessions to improve gait and weight bearing tolerance as able. Pt continues to have discomfort with standing balance, mobility, and strengthening activities for RLE due to weight bearing intolerance, continue to progress and modify activities as tolerated. Pt tolerated manual R ankle PROM and stretching with reported decrease in stiffness post manual treatment. Pt has difficulty with ankle 4 way at start of activity but improved ROM as exercise progresses. Pt would benefit from continued skilled PT to improve RLE strength, mobility, gait, balance, weight bearing tolerance, and functional ability.    Plan: Continue per plan of care.  Progress treatment as tolerated.       Precautions: s/p R Trimalleolar fracture of ankle with subsequent ORIF on 5/3/24.    Date 9/5 9/19 10/1   7/31 8/5 8/12 8/19 8/22   Visit # 11 12 13   6 7 8 9 10   FOTO done            Re-eval               Manuals 9/5 9/19 10/1   7/31 8/5 8/12 8/19 8/22   R ankle PROM DK DK DK   DK HY PK NH DK                                          Neuro Re-Ed 9/5 9/19 10/1   7/31 8/5 8/12 8/19 8/22   Ankle pumps 20x  20x   30x 30x 30x 30x    Inversion &  "eversion        15 x      Ankle circles 30x c,cc  20x c,cc   20x c,cc 20x c,cc 30x 30x c,cc 30x c   Ankle alphabet 1x 1x    1x 1x 1x  1x   Seated rockerboard nv  Nv stand     nv 20x f/b, s/s 20x f/b standing   Seated heel raises        nv 2x20    marbles             Towel scrunches         1x20 30x   SLS 15x3\" w hand lifts 20x3\" w hand lifts 20x3\" w hand lifts       20x3\" w hand lifts   Seated bosu ball push  20x stand 20x sit      x20                 Ther Ex 9/5 9/19 10/1   7/31 8/5 8/12 8/19 8/22   bike nv 6' 3' TM       6' sneaker   Long sit calf str 5x20\"  5x20\" 5x20\"   5x20\" R 5x20\" R 30\" x 3 30\" x 3 5x20\"   Standing heel raises 20x UE sup 30x UE sup 30x UE sup HR/TR     nv  20x UE sup   Seated heel slides             Ankle 4 way iso 20x ea PTB R, GTB PF 20x ea GTB R 20x ea GTB R     nv 1x15 OTB ea R 15x ea PTB R   Slantboard str             S/l leg press 2x10 35# RLE 2x15 45# RLE 2x10 45# RLE       2x10 35# RLE   DF mob on step  20x3\"  20x3\"       10x5\"   Ther Activity 9/5 9/19 10/1       8/22   squats             Step ups nv 20x 1R R up 20x 1R R up       15x 1R R up   Gait Training 9/5 9/19 8/5 8/12 8/22   Gait w sneaker 3 laps slow march 3 laps slow march        3 laps slow march Modalities 7/31 8/5 8/12     ice   def   10' post 2 cases 1 towel 10' post 2 cases 1 towel Deferred; states she will apply at home deferred                                          "

## 2024-10-14 ENCOUNTER — APPOINTMENT (OUTPATIENT)
Dept: PHYSICAL THERAPY | Facility: CLINIC | Age: 49
End: 2024-10-14
Payer: COMMERCIAL

## 2024-10-16 ENCOUNTER — APPOINTMENT (OUTPATIENT)
Dept: PHYSICAL THERAPY | Facility: CLINIC | Age: 49
End: 2024-10-16
Payer: COMMERCIAL

## 2024-10-16 NOTE — PROGRESS NOTES
"Daily Note     Today's date: 10/16/2024  Patient name: Latha Liu  : 1975  MRN: [id number]  Referring provider: Nghia Owens D*  Dx: No diagnosis found.               Subjective: Pt presents to PT      Objective: See treatment diary below      Assessment: Tolerated treatment well. Patient demonstrated fatigue post treatment, exhibited good technique with therapeutic exercises, and would benefit from continued PT to increase flexibility, strength and function.      Plan: Continue per plan of care.      Precautions: s/p R Trimalleolar fracture of ankle with subsequent ORIF on 5/3/24.    Date 9/5 9/19 10/1  10/16    8/19 8/22   Visit # 11 12 13      9 10   FOTO done            Re-eval               Manuals 9/5 9/19 10/1  10/16    8/19 8/22   R ankle PROM DK DK DK      NH DK                                          Neuro Re-Ed 9/5 9/19 10/1  10/16    8/19 8/22   Ankle pumps 20x  20x      30x    Inversion & eversion             Ankle circles 30x c,cc  20x c,cc      30x c,cc 30x c   Ankle alphabet 1x 1x        1x   Seated rockerboard nv  Nv stand      20x f/b, s/s 20x f/b standing   Seated heel raises         2x20    marbles             Towel scrunches         1x20 30x   SLS 15x3\" w hand lifts 20x3\" w hand lifts 20x3\" w hand lifts       20x3\" w hand lifts   Seated bosu ball push  20x stand 20x sit      x20                 Ther Ex 9/5 9/19 10/1  10/16    8/19 8/22   bike nv 6' 3' TM       6' sneaker   Long sit calf str 5x20\"  5x20\" 5x20\"      30\" x 3 5x20\"   Standing heel raises 20x UE sup 30x UE sup 30x UE sup HR/TR       20x UE sup   Seated heel slides             Ankle 4 way iso 20x ea PTB R, GTB PF 20x ea GTB R 20x ea GTB R      1x15 OTB ea R 15x ea PTB R   Slantboard str             S/l leg press 2x10 35# RLE 2x15 45# RLE 2x10 45# RLE       2x10 35# RLE   DF mob on step  20x3\"  20x3\"       10x5\"   Ther Activity 9/5 9/19 10/1       8/22   squats             Step ups nv 20x 1R R up 20x 1R R up       " 15x 1R R up   Gait Training 9/5 9/19   10/16     8/22   Gait w sneaker 3 laps slow march 3 laps slow march        3 laps slow march                Modalities

## 2024-10-17 ENCOUNTER — OFFICE VISIT (OUTPATIENT)
Dept: PHYSICAL THERAPY | Facility: CLINIC | Age: 49
End: 2024-10-17
Payer: COMMERCIAL

## 2024-10-17 DIAGNOSIS — S82.851A CLOSED TRIMALLEOLAR FRACTURE OF RIGHT ANKLE, INITIAL ENCOUNTER: ICD-10-CM

## 2024-10-17 DIAGNOSIS — G89.18 POST-OP PAIN: Primary | ICD-10-CM

## 2024-10-17 PROCEDURE — 97140 MANUAL THERAPY 1/> REGIONS: CPT | Performed by: PHYSICAL THERAPIST

## 2024-10-17 PROCEDURE — 97112 NEUROMUSCULAR REEDUCATION: CPT | Performed by: PHYSICAL THERAPIST

## 2024-10-17 PROCEDURE — 97010 HOT OR COLD PACKS THERAPY: CPT | Performed by: PHYSICAL THERAPIST

## 2024-10-17 NOTE — PROGRESS NOTES
Daily Note     Today's date: 10/17/2024  Patient name: Latha Liu  : 1975  MRN: [id number]  Referring provider: Nghia Owens D*  Dx:   Encounter Diagnosis     ICD-10-CM    1. Post-op pain  G89.18       2. Closed trimalleolar fracture of right ankle, initial encounter  S82.851A           Start Time: 1530  Stop Time: 1605  Total time in clinic (min): 35 minutes    Subjective: Patient reports injuring inside of R ankle from slipping on a chair 2 days ago. Pt reports medication, elevation, and ice provide minimal relief of pain and swelling.      Objective: See treatment diary below, R figure 8: 54.5 cm, L figure 8: 51cm      Assessment: Patient presents with moderately greater R ankle swelling compared to L post injury two day ago, incisions intact with no abrasions visible, slight redness over anteromedial incision, scar tissue present post ORIF. Pt tolerated PROM well with decreased ROM 2/2 swelling and pain. Patient tolerated ankle pumps and ankle circles well despite increased pain and swelling, experienced some medial ankle pain with inversion during ankle circles. Weight bearing and strengthening exercises held given high irritability and weight bearing intolerance. Patient educated on optimal frequency and duration of using cold pack for improved pain levels and swelling, medial ankle hypersensitivity present given pt intolerance to more than 6 minutes of cold pack today. Patient demonstrated fatigue post treatment, exhibited good technique with therapeutic exercises, and would benefit from continued PT.      Plan: Continue per plan of care.  Progress treatment as tolerated.      Session completed by CHASITY Ochoa under supervision from Francisco J Cameron DPT.      Precautions: s/p R Trimalleolar fracture of ankle with subsequent ORIF on 5/3/24.    Date 9/5 9/19 10/1 10/17  7/31 8/5 8/12 8/19 8/22   Visit # 11 12 13 14  6 7 8 9 10   FOTO done            Re-eval               Manuals   "10/1 10/17  7/31 8/5 8/12 8/19 8/22   R ankle PROM DK DK DK ND   DK HY PK NH DK                                          Neuro Re-Ed 9/5 9/19 10/1 10/17  7/31 8/5 8/12 8/19 8/22   Ankle pumps 20x  20x 20x ea  30x 30x 30x 30x    Inversion & eversion        15 x      Ankle circles 30x c,cc  20x c,cc 2x10 cw and ccw w/ elevation  20x c,cc 20x c,cc 30x 30x c,cc 30x c   Ankle alphabet 1x 1x    1x 1x 1x  1x   Seated rockerboard nv  Nv stand 2x10 frwd/back,     nv 20x f/b, s/s 20x f/b standing   Seated heel raises        nv 2x20    marbles             Towel scrunches         1x20 30x   SLS 15x3\" w hand lifts 20x3\" w hand lifts 20x3\" w hand lifts       20x3\" w hand lifts   Seated bosu ball push  20x stand 20x sit      x20    Pt edu    ND         Ther Ex 9/5 9/19 10/1 10/17  7/31 8/5 8/12 8/19 8/22   bike nv 6' 3' TM       6' sneaker   Long sit calf str 5x20\"  5x20\" 5x20\"   5x20\" R 5x20\" R 30\" x 3 30\" x 3 5x20\"   Standing heel raises 20x UE sup 30x UE sup 30x UE sup HR/TR     nv  20x UE sup   Seated heel slides             Ankle 4 way iso 20x ea PTB R, GTB PF 20x ea GTB R 20x ea GTB R     nv 1x15 OTB ea R 15x ea PTB R   Slantboard str             S/l leg press 2x10 35# RLE 2x15 45# RLE 2x10 45# RLE       2x10 35# RLE   DF mob on step  20x3\"  20x3\"       10x5\"   Ther Activity 9/5 9/19 10/1 10/17      8/22   squats             Step ups nv 20x 1R R up 20x 1R R up       15x 1R R up   Gait Training 9/5 9/19  10/17   8/5 8/12  8/22   Gait w sneaker 3 laps slow march 3 laps slow march        3 laps slow march Modalities 7/31 8/5 8/12     ice   def 6' w/ elevation  10' post 2 cases 1 towel 10' post 2 cases 1 towel Deferred; states she will apply at home deferred                                            "

## 2024-10-22 ENCOUNTER — APPOINTMENT (OUTPATIENT)
Dept: PHYSICAL THERAPY | Facility: CLINIC | Age: 49
End: 2024-10-22
Payer: COMMERCIAL

## 2024-10-31 ENCOUNTER — OFFICE VISIT (OUTPATIENT)
Dept: PHYSICAL THERAPY | Facility: CLINIC | Age: 49
End: 2024-10-31
Payer: COMMERCIAL

## 2024-10-31 DIAGNOSIS — G89.18 POST-OP PAIN: Primary | ICD-10-CM

## 2024-10-31 DIAGNOSIS — S82.851A CLOSED TRIMALLEOLAR FRACTURE OF RIGHT ANKLE, INITIAL ENCOUNTER: ICD-10-CM

## 2024-10-31 PROCEDURE — 97140 MANUAL THERAPY 1/> REGIONS: CPT

## 2024-10-31 PROCEDURE — 97110 THERAPEUTIC EXERCISES: CPT

## 2024-10-31 NOTE — PROGRESS NOTES
Daily Note     Today's date: 10/31/2024  Patient name: Latha Liu  : 1975  MRN: [id number]  Referring provider: Nghia Owens D*  Dx:   Encounter Diagnosis     ICD-10-CM    1. Post-op pain  G89.18       2. Closed trimalleolar fracture of right ankle, initial encounter  S82.851A           Start Time: 1500  Stop Time: 1550  Total time in clinic (min): 50 minutes    Subjective: Pt stated that she feels like the apparatus in her R foot is loose and can feel it move when she is sitting trying to move her ankle around. Pt also stated that she has not been going out much because she is scared that she will hurt it further. Pt continues to have significant pain on the medial aspect of her foot and ankle along incision.       Objective: See treatment diary below      Assessment: Pt deferred warm up on bike or treadmill at start of session. Pt tolerated manual R ankle PROM and stretching with reported decrease in stiffness post manual treatment, but did have pain when taken through passive ankle circles. Pt required encouragement throughout session to perform strengthening and weight bearing activities due to decreased confidence and fear but pt agreed to performing them after manual stretching and PROM activities in long sitting. Pt demonstrated difficulty with resisted ankle 4 way activity but showed slight improvement in ROM with performance of repetitions. Pt had difficulty with step ups, especially with stepping down with the L foot. Pt would benefit from continued skilled PT to improve R foot and ankle strength, mobility, weight bearing ability, flexibility, and functional ability.     Plan: Continue per plan of care.  Progress treatment as tolerated.       Precautions: s/p R Trimalleolar fracture of ankle with subsequent ORIF on 5/3/24.    Date 9/5 9/19 10/1 10/17 10/31        Visit # 11 12 13 14 15        FOTO done            Re-eval               Manuals 9/5 9/19 10/1 10/17 10/31        R ankle  "PROM DK DK DK ND  DK                                               Neuro Re-Ed 9/5 9/19 10/1 10/17 10/31        Ankle pumps 20x  20x 20x ea 20x ea        Inversion & eversion             Ankle circles 30x c,cc  20x c,cc 2x10 cw and ccw w/ elevation 30x c,cc        Ankle alphabet 1x 1x   1x        Seated rockerboard nv  Nv stand 2x10 frwd/back,  30x fwd/back, s/s        Seated heel raises             marbles             Towel scrunches             SLS 15x3\" w hand lifts 20x3\" w hand lifts 20x3\" w hand lifts  20x3\" w hand lifts        Seated bosu ball push  20x stand 20x sit          Pt edu    ND         Ther Ex 9/5 9/19 10/1 10/17 10/31        bike nv 6' 3' TM  def        Long sit calf str 5x20\"  5x20\" 5x20\"  5x20\"         Standing heel raises 20x UE sup 30x UE sup 30x UE sup HR/TR  30x UE sup HR/TR        Seated heel slides             Ankle 4 way iso 20x ea PTB R, GTB PF 20x ea GTB R 20x ea GTB R  20x ea GTB R        Slantboard str             S/l leg press 2x10 35# RLE 2x15 45# RLE 2x10 45# RLE          DF mob on step  20x3\"  20x3\"  20x3\"        Ther Activity 9/5 9/19 10/1 10/17 10/31        squats             Step ups nv 20x 1R R up 20x 1R R up  20x 1R  R up         Gait Training 9/5 9/19  10/17 10/31        Gait w sneaker 3 laps slow march 3 laps slow march                        Modalities             ice   def 6' w/ elevation At home                                                  "

## 2024-11-02 ENCOUNTER — HOSPITAL ENCOUNTER (OUTPATIENT)
Dept: CT IMAGING | Facility: HOSPITAL | Age: 49
Discharge: HOME | End: 2024-11-02
Attending: PODIATRIST
Payer: COMMERCIAL

## 2024-11-02 DIAGNOSIS — M25.471 RIGHT ANKLE SWELLING: ICD-10-CM

## 2024-11-02 DIAGNOSIS — S82.851D CLOSED TRIMALLEOLAR FRACTURE OF RIGHT ANKLE WITH ROUTINE HEALING, SUBSEQUENT ENCOUNTER: ICD-10-CM

## 2024-11-02 PROCEDURE — 73700 CT LOWER EXTREMITY W/O DYE: CPT

## 2025-02-06 ENCOUNTER — APPOINTMENT (OUTPATIENT)
Dept: RADIOLOGY | Age: 50
End: 2025-02-06
Payer: COMMERCIAL

## 2025-02-06 ENCOUNTER — OFFICE VISIT (OUTPATIENT)
Dept: PODIATRY | Facility: CLINIC | Age: 50
End: 2025-02-06
Payer: COMMERCIAL

## 2025-02-06 DIAGNOSIS — T85.848A PAIN FROM IMPLANTED HARDWARE, INITIAL ENCOUNTER: ICD-10-CM

## 2025-02-06 DIAGNOSIS — S82.851S CLOSED TRIMALLEOLAR FRACTURE OF RIGHT ANKLE, SEQUELA: Primary | ICD-10-CM

## 2025-02-06 DIAGNOSIS — M25.571 ACUTE RIGHT ANKLE PAIN: ICD-10-CM

## 2025-02-06 PROCEDURE — 73610 X-RAY EXAM OF ANKLE: CPT

## 2025-02-06 PROCEDURE — 99214 OFFICE O/P EST MOD 30 MIN: CPT | Performed by: PODIATRIST

## 2025-02-06 NOTE — LETTER
February 6, 2025     Patient: Latha Liu  YOB: 1975  Date of Visit: 2/6/2025      To Whom it May Concern:    Latha Liu is under my professional care. Latha was seen in my office on 2/6/2025. Latha is having ankle surgery. I am hoping with her current housing situation, I am requesting she live on the first floor. Her ankle injury makes stairs very difficult.     If you have any questions or concerns, please don't hesitate to call.         Sincerely,          Nghia Owens DPM        CC: No Recipients

## 2025-02-06 NOTE — PROGRESS NOTES
Assessment/Plan:      Diagnoses and all orders for this visit:    Closed trimalleolar fracture of right ankle, sequela  -     XR ankle 3+ vw right; Future  -     Comprehensive metabolic panel; Future  -     CBC and Platelet; Future  -     Case request operating room: REMOVAL HARDWARE medial malleolus right ankle; Standing  -     Case request operating room: REMOVAL HARDWARE medial malleolus right ankle    Pain from implanted hardware, initial encounter  -     Comprehensive metabolic panel; Future  -     CBC and Platelet; Future  -     Case request operating room: REMOVAL HARDWARE medial malleolus right ankle; Standing  -     Case request operating room: REMOVAL HARDWARE medial malleolus right ankle    Other orders  -     Incentive spirometry; Standing  -     Insert and maintain IV line; Standing  -     Void On-Call to O.R.; Standing  -     Insert peripheral IV; Standing  -     ceFAZolin (ANCEF) 2,000 mg in sodium chloride 0.9 % 50 mL IVPB  -     Place sequential compression device; Standing      CT and XR today reviewed. Fractures are healed but she seems to have sharp pain from the screw head in the medial malleolus. I am hoping removal of this issue would resolve most of her pain. Her fracture was severely comminuted, there is bound to be some residual swelling. We have previously discussed compression stocking. On the CT, all fractures are healed in good anatomic alignment.     Consent was signed for removal screw right ankle (2 screws in medial malleolus)    Surgical procedure and recovery discussed as can best be predicted.  Alternatives, risks, complications covered with the patient.    Appropriate postop medication discussed, educated patient on narcotic medication and its risks.     Patient will be sent for preoperative testing and clearance    Patient doen not need DVT prophylaxis for this procedure      Subjective:     Patient ID: Latha Liu is a 49 y.o. female.    Patient fracture her right ankle  last May 2024. She had a trimalleolar fracture ORIF. She was getting lingering pain and swelling, A CT was done last November but she never returned for followup. Today she states she still has a lot of swelling and sharp pain medial ankle (points to medial malleolus). Any pressure to this area is tender.         Review of Systems    As stated in HPI, otherwise normal    Medical History Reviewed by provider this encounter:  Tobacco  Allergies  Meds  Problems  Med Hx  Surg Hx  Fam Hx      Objective:     Physical Exam  Vitals reviewed.   Constitutional:       Appearance: She is not ill-appearing or diaphoretic.   Cardiovascular:      Rate and Rhythm: Normal rate.      Pulses: Normal pulses.   Pulmonary:      Effort: Pulmonary effort is normal. No respiratory distress.   Musculoskeletal:         General: Swelling and tenderness (sharp pain tip right medial malleolus. PT inact, no weakenss with inversion. Achilles normal. No lateral ankle pain. Extensor function normal) present.   Skin:     Capillary Refill: Capillary refill takes less than 2 seconds.      Findings: No erythema or rash.   Neurological:      Mental Status: She is alert and oriented to person, place, and time.      Sensory: No sensory deficit.      Gait: Gait normal.   Psychiatric:         Mood and Affect: Mood normal.

## 2025-02-07 ENCOUNTER — TELEPHONE (OUTPATIENT)
Dept: PODIATRY | Facility: CLINIC | Age: 50
End: 2025-02-07

## 2025-02-28 RX ORDER — DULOXETIN HYDROCHLORIDE 60 MG/1
60 CAPSULE, DELAYED RELEASE ORAL 2 TIMES DAILY
COMMUNITY

## 2025-02-28 NOTE — PRE-PROCEDURE INSTRUCTIONS
Pre-Surgery Instructions:   Medication Instructions    acetaminophen (TYLENOL) 650 mg CR tablet Uses PRN- OK to take day of surgery    clonazePAM (KlonoPIN) 2 mg tablet Take day of surgery.    DULoxetine (CYMBALTA) 60 mg delayed release capsule Take day of surgery.    estradiol (ESTRACE) 1 mg tablet Take night before surgery    hydrOXYzine HCL (ATARAX) 25 mg tablet Take night before surgery    QUEtiapine (SEROquel) 300 mg tablet Take night before surgery    zolpidem (AMBIEN) 10 mg tablet Take night before surgery    Medication instructions for day of surgery reviewed. Please take all instructed medications with only a sip of water.       You will receive a call one business day prior to surgery with an arrival time and hospital directions. If your surgery is scheduled on a Monday, the hospital will be calling you on the Friday prior to your surgery. If you have not heard from anyone by 8pm, please call the hospital supervisor through the hospital  at [phone]. ([place] [phone] or [place] [phone]).    Do not eat or drink anything after midnight the night before your surgery, including candy, mints, lifesavers, or chewing gum. Do not drink alcohol 24hrs before your surgery. Try not to smoke at least 24hrs before your surgery.       Follow the pre surgery showering instructions as listed in the “My Surgical Experience Booklet” or otherwise provided by your surgeon's office. Do not use a blade to shave the surgical area 1 week before surgery. It is okay to use a clean electric clippers up to 24 hours before surgery. Do not apply any lotions, creams, including makeup, cologne, deodorant, or perfumes after showering on the day of your surgery. Do not use dry shampoo, hair spray, hair gel, or any type of hair products.     No contact lenses, eye make-up, or artificial eyelashes. Remove nail polish, including gel polish, and any artificial, gel, or acrylic nails if possible. Remove all jewelry  including rings and body piercing jewelry.     Wear causal clothing that is easy to take on and off. Consider your type of surgery.    Keep any valuables, jewelry, piercings at home. Please bring any specially ordered equipment (sling, braces) if indicated.    Arrange for a responsible person to drive you to and from the hospital on the day of your surgery. Please confirm the visitor policy for the day of your procedure when you receive your phone call with an arrival time.     Call the surgeon's office with any new illnesses, exposures, or additional questions prior to surgery.    Please reference your “My Surgical Experience Booklet” for additional information to prepare for your upcoming surgery.

## 2025-03-03 ENCOUNTER — APPOINTMENT (OUTPATIENT)
Dept: LAB | Facility: MEDICAL CENTER | Age: 50
End: 2025-03-03
Payer: COMMERCIAL

## 2025-03-03 DIAGNOSIS — T85.848A PAIN FROM IMPLANTED HARDWARE, INITIAL ENCOUNTER: ICD-10-CM

## 2025-03-03 DIAGNOSIS — S82.851S CLOSED TRIMALLEOLAR FRACTURE OF RIGHT ANKLE, SEQUELA: ICD-10-CM

## 2025-03-03 LAB
ALBUMIN SERPL BCG-MCNC: 3.7 G/DL (ref 3.5–5)
ALP SERPL-CCNC: 87 U/L (ref 34–104)
ALT SERPL W P-5'-P-CCNC: 13 U/L (ref 7–52)
ANION GAP SERPL CALCULATED.3IONS-SCNC: 9 MMOL/L (ref 4–13)
AST SERPL W P-5'-P-CCNC: 21 U/L (ref 13–39)
BILIRUB SERPL-MCNC: 0.2 MG/DL (ref 0.2–1)
BUN SERPL-MCNC: 10 MG/DL (ref 5–25)
CALCIUM SERPL-MCNC: 8.3 MG/DL (ref 8.4–10.2)
CHLORIDE SERPL-SCNC: 106 MMOL/L (ref 96–108)
CO2 SERPL-SCNC: 26 MMOL/L (ref 21–32)
CREAT SERPL-MCNC: 0.92 MG/DL (ref 0.6–1.3)
ERYTHROCYTE [DISTWIDTH] IN BLOOD BY AUTOMATED COUNT: 14 % (ref 11.6–15.1)
GFR SERPL CREATININE-BSD FRML MDRD: 72 ML/MIN/1.73SQ M
GLUCOSE SERPL-MCNC: 140 MG/DL (ref 65–140)
HCT VFR BLD AUTO: 38.1 % (ref 34.8–46.1)
HGB BLD-MCNC: 12.2 G/DL (ref 11.5–15.4)
MCH RBC QN AUTO: 30.6 PG (ref 26.8–34.3)
MCHC RBC AUTO-ENTMCNC: 32 G/DL (ref 31.4–37.4)
MCV RBC AUTO: 96 FL (ref 82–98)
PLATELET # BLD AUTO: 281 THOUSANDS/UL (ref 149–390)
PMV BLD AUTO: 11.1 FL (ref 8.9–12.7)
POTASSIUM SERPL-SCNC: 3.8 MMOL/L (ref 3.5–5.3)
PROT SERPL-MCNC: 6.6 G/DL (ref 6.4–8.4)
RBC # BLD AUTO: 3.99 MILLION/UL (ref 3.81–5.12)
SODIUM SERPL-SCNC: 141 MMOL/L (ref 135–147)
WBC # BLD AUTO: 7.08 THOUSAND/UL (ref 4.31–10.16)

## 2025-03-03 PROCEDURE — 80053 COMPREHEN METABOLIC PANEL: CPT

## 2025-03-03 PROCEDURE — 85027 COMPLETE CBC AUTOMATED: CPT

## 2025-03-03 PROCEDURE — 36415 COLL VENOUS BLD VENIPUNCTURE: CPT

## 2025-03-07 ENCOUNTER — HOSPITAL ENCOUNTER (OUTPATIENT)
Facility: HOSPITAL | Age: 50
Setting detail: OUTPATIENT SURGERY
Discharge: HOME | End: 2025-03-07
Attending: PODIATRIST | Admitting: PODIATRIST
Payer: COMMERCIAL

## 2025-03-07 ENCOUNTER — APPOINTMENT (OUTPATIENT)
Dept: RADIOLOGY | Facility: HOSPITAL | Age: 50
End: 2025-03-07
Payer: COMMERCIAL

## 2025-03-07 ENCOUNTER — ANESTHESIA EVENT (OUTPATIENT)
Dept: PERIOP | Facility: HOSPITAL | Age: 50
End: 2025-03-07
Payer: COMMERCIAL

## 2025-03-07 ENCOUNTER — ANESTHESIA (OUTPATIENT)
Dept: PERIOP | Facility: HOSPITAL | Age: 50
End: 2025-03-07
Payer: COMMERCIAL

## 2025-03-07 VITALS
SYSTOLIC BLOOD PRESSURE: 139 MMHG | OXYGEN SATURATION: 97 % | HEART RATE: 76 BPM | TEMPERATURE: 97.6 F | BODY MASS INDEX: 34.15 KG/M2 | HEIGHT: 64 IN | RESPIRATION RATE: 18 BRPM | WEIGHT: 200 LBS | DIASTOLIC BLOOD PRESSURE: 94 MMHG

## 2025-03-07 DIAGNOSIS — Z98.890 POST-OPERATIVE STATE: Primary | ICD-10-CM

## 2025-03-07 PROCEDURE — 73610 X-RAY EXAM OF ANKLE: CPT

## 2025-03-07 PROCEDURE — 20680 REMOVAL OF IMPLANT DEEP: CPT | Performed by: PODIATRIST

## 2025-03-07 PROCEDURE — 99024 POSTOP FOLLOW-UP VISIT: CPT | Performed by: PODIATRIST

## 2025-03-07 RX ORDER — SODIUM CHLORIDE, SODIUM LACTATE, POTASSIUM CHLORIDE, CALCIUM CHLORIDE 600; 310; 30; 20 MG/100ML; MG/100ML; MG/100ML; MG/100ML
125 INJECTION, SOLUTION INTRAVENOUS CONTINUOUS
Status: CANCELLED | OUTPATIENT
Start: 2025-03-07

## 2025-03-07 RX ORDER — LIDOCAINE HYDROCHLORIDE 10 MG/ML
INJECTION, SOLUTION EPIDURAL; INFILTRATION; INTRACAUDAL; PERINEURAL AS NEEDED
Status: DISCONTINUED | OUTPATIENT
Start: 2025-03-07 | End: 2025-03-07

## 2025-03-07 RX ORDER — OXYCODONE AND ACETAMINOPHEN 5; 325 MG/1; MG/1
1 TABLET ORAL EVERY 6 HOURS PRN
Qty: 15 TABLET | Refills: 0 | Status: SHIPPED | OUTPATIENT
Start: 2025-03-07 | End: 2025-03-14

## 2025-03-07 RX ORDER — SODIUM CHLORIDE, SODIUM LACTATE, POTASSIUM CHLORIDE, CALCIUM CHLORIDE 600; 310; 30; 20 MG/100ML; MG/100ML; MG/100ML; MG/100ML
INJECTION, SOLUTION INTRAVENOUS CONTINUOUS PRN
Status: DISCONTINUED | OUTPATIENT
Start: 2025-03-07 | End: 2025-03-07

## 2025-03-07 RX ORDER — DEXAMETHASONE SODIUM PHOSPHATE 10 MG/ML
INJECTION, SOLUTION INTRAMUSCULAR; INTRAVENOUS AS NEEDED
Status: DISCONTINUED | OUTPATIENT
Start: 2025-03-07 | End: 2025-03-07

## 2025-03-07 RX ORDER — PROPOFOL 10 MG/ML
INJECTION, EMULSION INTRAVENOUS AS NEEDED
Status: DISCONTINUED | OUTPATIENT
Start: 2025-03-07 | End: 2025-03-07

## 2025-03-07 RX ORDER — FENTANYL CITRATE 50 UG/ML
INJECTION, SOLUTION INTRAMUSCULAR; INTRAVENOUS AS NEEDED
Status: DISCONTINUED | OUTPATIENT
Start: 2025-03-07 | End: 2025-03-07

## 2025-03-07 RX ORDER — ONDANSETRON 2 MG/ML
INJECTION INTRAMUSCULAR; INTRAVENOUS AS NEEDED
Status: DISCONTINUED | OUTPATIENT
Start: 2025-03-07 | End: 2025-03-07

## 2025-03-07 RX ORDER — OXYCODONE AND ACETAMINOPHEN 5; 325 MG/1; MG/1
1 TABLET ORAL ONCE AS NEEDED
Refills: 0 | Status: COMPLETED | OUTPATIENT
Start: 2025-03-07 | End: 2025-03-07

## 2025-03-07 RX ORDER — HYDROMORPHONE HCL/PF 1 MG/ML
0.25 SYRINGE (ML) INJECTION
Status: DISCONTINUED | OUTPATIENT
Start: 2025-03-07 | End: 2025-03-07 | Stop reason: HOSPADM

## 2025-03-07 RX ORDER — MAGNESIUM HYDROXIDE 1200 MG/15ML
LIQUID ORAL AS NEEDED
Status: DISCONTINUED | OUTPATIENT
Start: 2025-03-07 | End: 2025-03-07 | Stop reason: HOSPADM

## 2025-03-07 RX ORDER — MIDAZOLAM HYDROCHLORIDE 2 MG/2ML
INJECTION, SOLUTION INTRAMUSCULAR; INTRAVENOUS AS NEEDED
Status: DISCONTINUED | OUTPATIENT
Start: 2025-03-07 | End: 2025-03-07

## 2025-03-07 RX ORDER — FENTANYL CITRATE 50 UG/ML
25 INJECTION, SOLUTION INTRAMUSCULAR; INTRAVENOUS
Status: DISCONTINUED | OUTPATIENT
Start: 2025-03-07 | End: 2025-03-07 | Stop reason: HOSPADM

## 2025-03-07 RX ORDER — CEFAZOLIN SODIUM 2 G/50ML
2000 SOLUTION INTRAVENOUS ONCE
Status: COMPLETED | OUTPATIENT
Start: 2025-03-07 | End: 2025-03-07

## 2025-03-07 RX ORDER — ONDANSETRON 2 MG/ML
4 INJECTION INTRAMUSCULAR; INTRAVENOUS ONCE AS NEEDED
Status: DISCONTINUED | OUTPATIENT
Start: 2025-03-07 | End: 2025-03-07 | Stop reason: HOSPADM

## 2025-03-07 RX ADMIN — FENTANYL CITRATE 50 MCG: 50 INJECTION, SOLUTION INTRAMUSCULAR; INTRAVENOUS at 12:05

## 2025-03-07 RX ADMIN — CEFAZOLIN SODIUM 2000 MG: 2 SOLUTION INTRAVENOUS at 11:49

## 2025-03-07 RX ADMIN — SODIUM CHLORIDE, SODIUM LACTATE, POTASSIUM CHLORIDE, AND CALCIUM CHLORIDE: .6; .31; .03; .02 INJECTION, SOLUTION INTRAVENOUS at 11:30

## 2025-03-07 RX ADMIN — FENTANYL CITRATE 50 MCG: 50 INJECTION, SOLUTION INTRAMUSCULAR; INTRAVENOUS at 11:58

## 2025-03-07 RX ADMIN — MIDAZOLAM 2 MG: 1 INJECTION INTRAMUSCULAR; INTRAVENOUS at 11:49

## 2025-03-07 RX ADMIN — ONDANSETRON 4 MG: 2 INJECTION INTRAMUSCULAR; INTRAVENOUS at 11:49

## 2025-03-07 RX ADMIN — DEXAMETHASONE SODIUM PHOSPHATE 10 MG: 10 INJECTION, SOLUTION INTRAMUSCULAR; INTRAVENOUS at 11:55

## 2025-03-07 RX ADMIN — LIDOCAINE HYDROCHLORIDE 50 MG: 10 SOLUTION INTRAVENOUS at 11:54

## 2025-03-07 RX ADMIN — PROPOFOL 200 MG: 10 INJECTION, EMULSION INTRAVENOUS at 11:54

## 2025-03-07 RX ADMIN — OXYCODONE HYDROCHLORIDE AND ACETAMINOPHEN 1 TABLET: 5; 325 TABLET ORAL at 13:14

## 2025-03-07 RX ADMIN — DEXMEDETOMIDINE HYDROCHLORIDE 8 MCG: 100 INJECTION, SOLUTION INTRAVENOUS at 12:29

## 2025-03-07 NOTE — ANESTHESIA POSTPROCEDURE EVALUATION
Post-Op Assessment Note    Last Filed PACU Vitals:  Vitals Value Taken Time   Temp 97.3 °F (36.3 °C) 03/07/25 1242   Pulse 93 03/07/25 1304   /87 03/07/25 1300   Resp 19 03/07/25 1304   SpO2 95 % 03/07/25 1303   Vitals shown include unfiled device data.    Modified Cynthia:     Vitals Value Taken Time   Activity 2 03/07/25 1300   Respiration 2 03/07/25 1300   Circulation 2 03/07/25 1300   Consciousness 2 03/07/25 1300   Oxygen Saturation 2 03/07/25 1300     Modified Cynthia Score: 10

## 2025-03-07 NOTE — INTERVAL H&P NOTE
H&P reviewed. After examining the patient I find no changes in the patients condition since the H&P had been written.    Vitals:    03/07/25 0926   BP: 123/87   Pulse: 76   Resp: 18   Temp: (!) 97 °F (36.1 °C)   SpO2: 100%

## 2025-03-07 NOTE — DISCHARGE INSTR - AVS FIRST PAGE
[facility]iatry  Dr. Nghia Owens, DPM, FACFAS  Post-Operative Instructions    1. Take your prescribed medication as directed. You can take ibuprofen in between doses of the narcotic if breakthrough pain occurs  2. Upon arrival at home, lie down and elevate your surgical foot on 2 pillows. Unless you're moving from the couch, bed, or bathroom, make sure to elevate the foot. Swelling is not your friend.   3. Remain quiet, off your feet as much as possible, for the first 24-48 hours. This is when your feet first swell and may become painful. After 48 hours you may begin limited walking following these restrictions:   Weightbear as tolerated to surgical foot, wearing your surgical shoe and using crutches for assistance.  4. Drink large quantities of water. Consume no alcohol. Continue a well-balanced diet.  5. Report any unusual discomfort or fever to this office.  6. A limited amount of discomfort and swelling is to be expected. In some cases the skin may take on a bruised appearance. The surgical solution that was applied to your foot prior to the operation is dark in color and the operation site may appear to be oozing when it actually is not.  7. A slight amount of blood is to be expected, and is no cause for alarm. Do not remove the dressings. If there is active bleeding and if the bleeding persists, add additional gauze to the bandage, apply direct pressure, elevate your feet and call this office.  8. Do not get the dressings wet. As regular bathing may be inconvenient, sponge baths are recommended. If you shower, keep the dressing dry.   9. When anesthesia wears off and if any discomfort should be present, apply an ice pack directly over the operated area for 15 minute intervals for several hours or until the pain leaves. (USE IN EXCESS OF 15 MINUTES COULD CAUSE FROSTBITE). Do not use hot water bags or electric pads. A convenient icepack can be made by placing ice cubes in a plastic bag and covering  this with a towel.  10. If necessary, take a mild laxative before retiring.  11. Wear your special boot anytime you put weight on your foot, even if it is just to walk to the bathroom and back. It will probably be a few weeks before you will be permitted to try regular shoes.  12. Having performed the operation, we are interested in a prompt recovery. Please cooperate by following the above instructions.  13. Please call to confirm your post-op appointment or call with any other questions.

## 2025-03-07 NOTE — NURSING NOTE
Pt in no apparent distress. Able to void and tolerate p.o. intake. Pt verbalized understanding to AVS instructions. IV removed.

## 2025-03-07 NOTE — ANESTHESIA POSTPROCEDURE EVALUATION
Post-Op Assessment Note    CV Status:  Stable  Pain Score: 0    Pain management: adequate       Mental Status:  Sleepy and arousable   PONV Controlled:  None   Airway Patency:  Patent     Post Op Vitals Reviewed: Yes    No anethesia notable event occurred.    Staff: CRNA           Last Filed PACU Vitals:  Vitals Value Taken Time   Temp 97.3 °F (36.3 °C) 03/07/25 1242   Pulse 95 03/07/25 1244   /85 03/07/25 1242   Resp 18 03/07/25 1242   SpO2 99 % 03/07/25 1244   Vitals shown include unfiled device data.    Modified Cynthia:     Vitals Value Taken Time   Activity 0 03/07/25 1242   Respiration 2 03/07/25 1242   Circulation 2 03/07/25 1242   Consciousness 0 03/07/25 1242   Oxygen Saturation 1 03/07/25 1242     Modified Cynthia Score: 5

## 2025-03-07 NOTE — DISCHARGE SUMMARY
Discharge Summary Outpatient Procedure Podiatry -   Latha Liu 50 y.o. female MRN: [id number]  Unit/Bed#: OR POOL Encounter: [id number]    Admission Date: 3/7/2025     Admitting Diagnosis: Closed trimalleolar fracture of right ankle, sequela [S82.851S]  Pain from implanted hardware, initial encounter [T89.262T]    Discharge Diagnosis: same    Procedures Performed: REMOVAL HARDWARE medial malleolus right ankle: 27767 (CPT®)    Complications: none    Condition at Discharge: stable    Discharge instructions/Information to patient and family:   See after visit summary for information provided to patient and family.      Provisions for Follow-Up Care/Important appointments:  See after visit summary for information related to follow-up care and any pertinent home health orders.      Discharge Medications:  See after visit summary for reconciled discharge medications provided to patient and family.

## 2025-03-07 NOTE — OP NOTE
OPERATIVE REPORT - Podiatry  PATIENT NAME: Latha Liu    :  1975  MRN: [id number]  Pt Location:  OR ROOM 12    SURGERY DATE: 3/7/2025    Surgeons and Role:     * Nghia Owens DPM - Primary     * Paul Martinez DPM - Assisting    Pre-op Diagnosis:  Closed trimalleolar fracture of right ankle, sequela [S82.851S]  Pain from implanted hardware, initial encounter [T85.848A]    Post-Op Diagnosis Codes:     * Closed trimalleolar fracture of right ankle, sequela [S82.851S]     * Pain from implanted hardware, initial encounter [T85.848A]    Procedure(s) (LRB):  REMOVAL HARDWARE medial malleolus right ankle (Right)    Specimen(s):  * No specimens in log *    Estimated Blood Loss:   Minimal    Drains:  * No LDAs found *    Anesthesia Type:   General/LMA with 10 ml of 1% Lidocaine and 0.5% Bupivacaine in a 1:1 mixture    Hemostasis:  Electrocautery    Materials:  3-0 and 4-0 Vicryl suture  4-0 Nylon suture    Operative Findings:  Consistent with pre op diagnosis. Two medial malleolus screws were removed from the previous right ankle ORIF.    Complications:   None    Procedure and Technique:     Under mild sedation, the patient was brought into the operating room and placed on the operating room table in the supine position. IV sedation was achieved by anesthesia team and a universal timeout was performed where all parties are in agreement of correct patient, correct procedure and correct site. A pneumatic tourniquet was then placed over the patient's right calf with ample padding but was not inflated for the entire case. A local V block was performed consisting of 10 ml of 1% Lidocaine and 0.5% Bupivacaine in a 1:1 mixture. The foot was then prepped and draped in the usual aseptic manner.    Attention was then directed to the right medial malleolus. A linear longitudinal skin incision was then made with a 15 blade along the scar from the prior ankle ORIF surgery. Sharp and blunt dissection was then carried  "through subcutaneous tissues with care taken to protect neurovascular structures and cauterize any bleeders as necessary. A periosteal incision was then made and the underlying two medial malleolus screw heads were then identified. Each of the two screws was able to be backed out with a screw  in one piece. There was no purulence and no sign of infection at the site of the screws. The site was irrigated with normal saline using a bulb syringe. Periosteal closure was obtained using 3-0 Vicryl suture in interrupted fashion. Subcutaneous closure was obtained using 4-0 Vicryl suture in interrupted fashion. Skin closure was obtained using 4-0 Nylon suture in interrupted fashion. The site was dressed with xeroform, 4x4 gauze, stef wrap, and ACE.    The patient tolerated the procedure and anesthesia well without immediate complications and transferred to PACU with vital signs stable.     Dr. Owens was present during the entire procedure and participated in all key aspects.    SIGNATURE: Paul Martinez DPM  DATE: March 7, 2025  TIME: 12:49 PM      Portions of the record may have been created with voice recognition software. Occasional wrong word or \"sound a like\" substitutions may have occurred due to the inherent limitations of voice recognition software. Read the chart carefully and recognize, using context, where substitutions have occurred.    "

## 2025-03-07 NOTE — ANESTHESIA PREPROCEDURE EVALUATION
Procedure:  REMOVAL HARDWARE medial malleolus right ankle (Right: Ankle)    Relevant Problems   /RENAL   (+) Acute kidney injury (HCC)      NEURO/PSYCH   (+) Severe episode of recurrent major depressive disorder, with psychotic features (HCC)      PULMONARY   (+) Mild intermittent asthma without complication      Plant for Right popliteal and adductor canal block followed by general anesthesia with LMA. Risks and benefits explained. Patient and surgeon in agreement with the above plan.       Physical Exam    Airway    Mallampati score: II  TM Distance: >3 FB  Neck ROM: full     Dental   No notable dental hx     Cardiovascular      Pulmonary      Other Findings  post-pubertal.      Anesthesia Plan  ASA Score- 2     Anesthesia Type- general with ASA Monitors.         Additional Monitors:     Airway Plan: LMA.           Plan Factors-Exercise tolerance (METS): >4 METS.    Chart reviewed.    Patient summary reviewed.    Patient is not a current smoker.  Patient did not smoke on day of surgery.            Induction- intravenous.    Postoperative Plan- Plan for postoperative opioid use.         Informed Consent- Anesthetic plan and risks discussed with patient.  I personally reviewed this patient with the CRNA. Discussed and agreed on the Anesthesia Plan with the CRNA..

## 2025-03-10 DIAGNOSIS — Z98.890 POST-OPERATIVE STATE: ICD-10-CM

## 2025-03-10 NOTE — TELEPHONE ENCOUNTER
Refill must be reviewed and completed by the office or provider. The refill is unable to be approved or denied by the medication management team.      03/07/2025 03/07/2025 oxyCODONE HYDROCHLORIDE 5 MG ORAL TABLET/ACETAMINOPHEN 325 MG (Tablet) 15.0 3 325 MG-5 MG 37.50 QUITA DAY [facility], [facility]. [place] 0 / 0 [place]   1 [id number] 02/10/2025 02/10/2025 clonazePAM (Tablet) 60.0 30 1 MG CAESAR [facility], [facility]. [place] / 1 [place]   1 [id number] 02/10/2025 02/10/2025 Zolpidem Tartrate (Tablet) 30.0 30 10 MG CAESAR [facility], [facility]. [place] 0 / 1 [place]

## 2025-03-10 NOTE — TELEPHONE ENCOUNTER
Reason for call:   [x] Refill   [] Prior Auth  [] Other:     Office:   [] PCP/Provider -  Podiatry Lacy Martinez DPM   [] Specialty/Provider -     Medication:   oxyCODONE-acetaminophen (Percocet) 5-325 mg per tablet    Dose/Frequency: Take 1 tablet by mouth every 6 (six) hours as needed for moderate pain for up to 7 days Max Daily Amount: 4 tablets     Quantity: 15 tablet     [id number] [address] [phone]    Local Pharmacy   Does the patient have enough for 3 days?   [] Yes   [x] No - Send as HP to POD    Mail Away Pharmacy   Does the patient have enough for 10 days?   [] Yes   [] No - Send as HP to POD

## 2025-03-11 DIAGNOSIS — Z98.890 POST-OPERATIVE STATE: ICD-10-CM

## 2025-03-11 RX ORDER — OXYCODONE AND ACETAMINOPHEN 5; 325 MG/1; MG/1
1 TABLET ORAL EVERY 6 HOURS PRN
Qty: 15 TABLET | Refills: 0 | OUTPATIENT
Start: 2025-03-11 | End: 2025-03-18

## 2025-03-11 NOTE — TELEPHONE ENCOUNTER
Patient had surgery on 03/07/25. Patient states that her pain is a 9.5.     Reason for call:   [x] Refill   [] Prior Auth  [] Other:     Office:   [] PCP/Provider -   [x] Specialty/Provider -   Nghia Owens DPM       Medication: oxyCODONE-acetaminophen (Percocet) 5-325 mg per tablet    Dose/Frequency:  Take 1 tablet by mouth every 6 (six) hours as needed for moderate pain for up to 7 days     Quantity: 15 tablet    Ph[id number] [address]   Does the patient have enough for 3 days?   [] Yes   [x] No - Send as HP to POD    Mail Away Pharmacy   Does the patient have enough for 10 days?   [] Yes   [] No - Send as HP to POD

## 2025-03-11 NOTE — TELEPHONE ENCOUNTER
Medication:  PDMP  03/07/2025 03/07/2025 oxyCODONE HYDROCHLORIDE 5 MG ORAL TABLET/ACETAMINOPHEN 325 MG (Tablet) 15.0 3 325 MG-5 MG 37.50 [facility] [place] / 0 [place]   1 [id number] 02/10/2025 02/10/2025 clonazePAM (Tablet) 60.0 30 1 MG CAESAR [place]   Refill must be reviewed and completed by the office or provider. The refill is unable to be approved or denied by the medication management team.

## 2025-03-13 ENCOUNTER — OFFICE VISIT (OUTPATIENT)
Dept: PODIATRY | Facility: CLINIC | Age: 50
End: 2025-03-13

## 2025-03-13 DIAGNOSIS — Z98.890 POST-OPERATIVE STATE: ICD-10-CM

## 2025-03-13 DIAGNOSIS — T85.848A PAIN FROM IMPLANTED HARDWARE, INITIAL ENCOUNTER: Primary | ICD-10-CM

## 2025-03-13 PROCEDURE — 99024 POSTOP FOLLOW-UP VISIT: CPT | Performed by: PODIATRIST

## 2025-03-13 RX ORDER — MIRTAZAPINE 45 MG/1
45 TABLET, FILM COATED ORAL
COMMUNITY

## 2025-03-13 NOTE — PROGRESS NOTES
:  Assessment & Plan  Pain from implanted hardware, initial encounter  Patient is stable postop 1 weeks    Incision: stable, sutures intact. No issues today    Instructions given to patient. Rest the foot as much as possible and elevate/ice  if swollen.    Ambulatory status: light WB is fine. Elevate, ice and rest as needed    Images reviewed today: postop images show anatomic ankle mortise. The 2 medial malleolar screws have been removed. No concerns    RTC: 1 week         Post-operative state             History of Present Illness     Latha Liu is a 50 y.o. female   DOS: 3/7/2025     Procedure: BRIJESH medial malleolus right ankle     Condition: Dressing C/D/I. PAin slowly improving. Patient denies N/F/V/C/D/CP/SOB          Review of Systems  Objective   There were no vitals taken for this visit.     Physical Exam  Vitals reviewed.   Musculoskeletal:         General: Tenderness (medial ankle incision TTPO. NO drainage or erythema. Sutures intact. PT and TA tnedons intact.) present.   Neurological:      Mental Status: She is alert.

## 2025-03-21 ENCOUNTER — OFFICE VISIT (OUTPATIENT)
Dept: PODIATRY | Facility: CLINIC | Age: 50
End: 2025-03-21

## 2025-03-21 VITALS — BODY MASS INDEX: 34.15 KG/M2 | HEIGHT: 64 IN | WEIGHT: 200 LBS

## 2025-03-21 DIAGNOSIS — T85.848A PAIN FROM IMPLANTED HARDWARE, INITIAL ENCOUNTER: Primary | ICD-10-CM

## 2025-03-21 DIAGNOSIS — Z98.890 POST-OPERATIVE STATE: ICD-10-CM

## 2025-03-21 PROCEDURE — 99024 POSTOP FOLLOW-UP VISIT: CPT | Performed by: PODIATRIST

## 2025-03-21 NOTE — PROGRESS NOTES
":  Assessment & Plan  Pain from implanted hardware, initial encounter  Patient is stable postop 2 weeks    Incision: healed, sutures removed    Instructions given to patient. Rest the foot as much as possible and elevate/ice  if swollen.    Ambulatory status: slowly increase activity, PT recommended    Images reviewed today: none    RTC: 4-6 weeks. She has little to no pain, she is walking in slippers already without discomfort. Advance as tolerated             Post-operative state             History of Present Illness     Latha Liu is a 50 y.o. female   DOS: 3/7/2025     Procedure: BRIJESH medial malleolus right ankle     Condition: She is 2 weeks postop, pain slowly improving. NO drainage from incision. Patient denies N/F/V/C/D/CP/SOB        Review of Systems  Objective   Ht 5' 4\" (1.626 m)   Wt 90.7 kg (200 lb)   BMI 34.33 kg/m²      Physical Exam  Vitals reviewed.   Cardiovascular:      Pulses: Normal pulses.   Musculoskeletal:         General: Tenderness (medial ankle TTP. Able to supinate with resistance, minimal PTT weakness or pain) present.   Skin:     Comments: Medial ankle incision is healed. Sutures intact. Mild edema             "

## (undated) DEVICE — STERILE POLYISOPRENE POWDER-FREE SURGICAL GLOVES: Brand: PROTEXIS

## (undated) DEVICE — ACE WRAP 4 IN UNSTERILE

## (undated) DEVICE — SCD SEQUENTIAL COMPRESSION COMFORT SLEEVE MEDIUM KNEE LENGTH: Brand: KENDALL SCD

## (undated) DEVICE — INTENDED FOR TISSUE SEPARATION, AND OTHER PROCEDURES THAT REQUIRE A SHARP SURGICAL BLADE TO PUNCTURE OR CUT.: Brand: BARD-PARKER ® SAFETYLOCK CARBON RIB-BACK BLADES

## (undated) DEVICE — CURITY STRETCH BANDAGE: Brand: CURITY

## (undated) DEVICE — DISPOSABLE OR TOWEL: Brand: CARDINAL HEALTH

## (undated) DEVICE — SINGLE PORT MANIFOLD: Brand: NEPTUNE 2

## (undated) DEVICE — ARTHREX DRILL BIT 2.5MM

## (undated) DEVICE — STERILE POLYISOPRENE POWDER-FREE SURGICAL GLOVES WITH EMOLLIENT COATING: Brand: PROTEXIS

## (undated) DEVICE — SUT VICRYL 3-0 SH 27 IN J416H

## (undated) DEVICE — NEEDLE BLUNT 18 G X 1 1/2IN

## (undated) DEVICE — DRAPE C-ARMOUR

## (undated) DEVICE — ABDOMINAL PAD: Brand: DERMACEA

## (undated) DEVICE — NEPTUNE E-SEP SMOKE EVACUATION PENCIL, COATED, 70MM BLADE, PUSH BUTTON SWITCH: Brand: NEPTUNE E-SEP

## (undated) DEVICE — CUFF TOURNIQUET 34 X 4 IN QUICK CONNECT DISP 1BLA

## (undated) DEVICE — KERLIX BANDAGE ROLL: Brand: KERLIX

## (undated) DEVICE — CUFF TOURNIQUET 18 X 4 IN QUICK CONNECT DISP 1 BLADDER

## (undated) DEVICE — CHLORAPREP HI-LITE 26ML ORANGE

## (undated) DEVICE — INTENDED FOR TISSUE SEPARATION, AND OTHER PROCEDURES THAT REQUIRE A SHARP SURGICAL BLADE TO PUNCTURE OR CUT.: Brand: BARD-PARKER SAFETY BLADES SIZE 15, STERILE

## (undated) DEVICE — SYRINGE 10ML LL

## (undated) DEVICE — FOAM WOUND DRESSING: Brand: DUAL-DRESS 50 11X12

## (undated) DEVICE — DRAPE C-ARM X-RAY

## (undated) DEVICE — IMPLANTABLE DEVICE
Type: IMPLANTABLE DEVICE | Site: ANKLE | Status: NON-FUNCTIONAL
Removed: 2024-05-03

## (undated) DEVICE — GAUZE SPONGES,16 PLY: Brand: CURITY

## (undated) DEVICE — BETHLEHEM UNIVERSAL  MIONR EXT: Brand: CARDINAL HEALTH

## (undated) DEVICE — OCCLUSIVE GAUZE STRIP,3% BISMUTH TRIBROMOPHENATE IN PETROLATUM BLEND: Brand: XEROFORM

## (undated) DEVICE — ACE WRAP 6 IN UNSTERILE

## (undated) DEVICE — K-WIRE 1.3 X 130MM TROCAR TIP
Type: IMPLANTABLE DEVICE | Site: ANKLE | Status: NON-FUNCTIONAL
Removed: 2024-05-03

## (undated) DEVICE — SPLINT 4 IN X 15 FT DYNACAST S

## (undated) DEVICE — CAST CART BUCKET

## (undated) DEVICE — DRILL BIT 2.5MM CALIB

## (undated) DEVICE — PENCIL ELECTROSURG E-Z CLEAN -0035H

## (undated) DEVICE — NEEDLE 25G X 1 1/2

## (undated) DEVICE — ACE WRAP 6 IN STERILE

## (undated) DEVICE — DRILL BIT 2 MM ANKLE CALIB GRAD ARTHREX

## (undated) DEVICE — PAD CAST 3 IN COTTON NON STRL

## (undated) DEVICE — DRILL BIT 2.6 X 160MM CANN